# Patient Record
Sex: FEMALE | Race: WHITE | Employment: OTHER | URBAN - METROPOLITAN AREA
[De-identification: names, ages, dates, MRNs, and addresses within clinical notes are randomized per-mention and may not be internally consistent; named-entity substitution may affect disease eponyms.]

---

## 2020-11-01 ENCOUNTER — HOSPITAL ENCOUNTER (EMERGENCY)
Facility: HOSPITAL | Age: 70
Discharge: HOME | End: 2020-11-01
Attending: EMERGENCY MEDICINE
Payer: MEDICARE

## 2020-11-01 ENCOUNTER — APPOINTMENT (OUTPATIENT)
Dept: GENERAL RADIOLOGY | Facility: HOSPITAL | Age: 70
End: 2020-11-01
Attending: EMERGENCY MEDICINE
Payer: MEDICARE

## 2020-11-01 ENCOUNTER — APPOINTMENT (OUTPATIENT)
Dept: CT IMAGING | Facility: HOSPITAL | Age: 70
End: 2020-11-01
Attending: EMERGENCY MEDICINE
Payer: MEDICARE

## 2020-11-01 VITALS
SYSTOLIC BLOOD PRESSURE: 136 MMHG | BODY MASS INDEX: 24.76 KG/M2 | WEIGHT: 138 LBS | HEIGHT: 62.75 IN | HEART RATE: 70 BPM | RESPIRATION RATE: 15 BRPM | OXYGEN SATURATION: 97 % | DIASTOLIC BLOOD PRESSURE: 76 MMHG | TEMPERATURE: 98 F

## 2020-11-01 DIAGNOSIS — R07.9 CHEST PAIN OF UNCERTAIN ETIOLOGY: Primary | ICD-10-CM

## 2020-11-01 PROCEDURE — 71045 X-RAY EXAM CHEST 1 VIEW: CPT | Performed by: EMERGENCY MEDICINE

## 2020-11-01 PROCEDURE — 71260 CT THORAX DX C+: CPT | Performed by: EMERGENCY MEDICINE

## 2020-11-01 PROCEDURE — 84484 ASSAY OF TROPONIN QUANT: CPT | Performed by: EMERGENCY MEDICINE

## 2020-11-01 PROCEDURE — 93005 ELECTROCARDIOGRAM TRACING: CPT

## 2020-11-01 PROCEDURE — 93010 ELECTROCARDIOGRAM REPORT: CPT | Performed by: EMERGENCY MEDICINE

## 2020-11-01 PROCEDURE — 85379 FIBRIN DEGRADATION QUANT: CPT | Performed by: EMERGENCY MEDICINE

## 2020-11-01 PROCEDURE — 96360 HYDRATION IV INFUSION INIT: CPT

## 2020-11-01 PROCEDURE — 96361 HYDRATE IV INFUSION ADD-ON: CPT

## 2020-11-01 PROCEDURE — 85025 COMPLETE CBC W/AUTO DIFF WBC: CPT | Performed by: EMERGENCY MEDICINE

## 2020-11-01 PROCEDURE — 99285 EMERGENCY DEPT VISIT HI MDM: CPT

## 2020-11-01 PROCEDURE — 80048 BASIC METABOLIC PNL TOTAL CA: CPT | Performed by: EMERGENCY MEDICINE

## 2020-11-01 RX ORDER — ASPIRIN 81 MG/1
324 TABLET, CHEWABLE ORAL ONCE
Status: COMPLETED | OUTPATIENT
Start: 2020-11-01 | End: 2020-11-01

## 2020-11-01 NOTE — ED PROVIDER NOTES
Patient Seen in: [facility] Emergency Department      History   Patient presents with:  Chest Pain Angina    Stated Complaint: Testing    HPI    79year old non-smoker female who is usually in good health, now presents with multiple complaints inc without pain, normal range of motion and neck supple. Normal range of motion. No neck rigidity. Cardiovascular:      Rate and Rhythm: Normal rate and regular rhythm. Heart sounds: Normal heart sounds. No murmur.    Pulmonary:      Effort: Pulmonary e Abnormal            Final result                 Please view results for these tests on the individual orders. SARS-COV-2 BY PCR ()     EKG    Rate, intervals and axes as noted on EKG Report.   Rate: 73  Rhythm: Sinus Rhythm  Reading: NSR cigarette use, [address], Obesity)    ?  3 risk factors or history of atherosclerotic disease+2  1-2 risk factors+1  No risk factors known - 0    Troponin 0  ? 3× normal limit+2  1-3× normal limit+1  ? normal/limit - 0    Low Score (0-3 points), risk of MACE of 0

## 2021-03-01 ENCOUNTER — NURSE TRIAGE (OUTPATIENT)
Dept: INTERNAL MEDICINE CLINIC | Facility: CLINIC | Age: 71
End: 2021-03-01

## 2021-03-01 ENCOUNTER — TELEPHONE (OUTPATIENT)
Dept: INTERNAL MEDICINE CLINIC | Facility: CLINIC | Age: 71
End: 2021-03-01

## 2021-03-01 NOTE — TELEPHONE ENCOUNTER
Action Requested: Summary for Provider     []  Critical Lab, Recommendations Needed  [] Need Additional Advice  []   FYI    []   Need Orders  [] Need Medications Sent to Pharmacy  []  Other     SUMMARY:  NEW PATIENT APPOINTMENT    Reports that she has to \

## 2021-03-02 ENCOUNTER — HOSPITAL ENCOUNTER (OUTPATIENT)
Dept: GENERAL RADIOLOGY | Age: 71
Discharge: HOME | End: 2021-03-02
Attending: INTERNAL MEDICINE
Payer: MEDICARE

## 2021-03-02 ENCOUNTER — OFFICE VISIT (OUTPATIENT)
Dept: INTERNAL MEDICINE CLINIC | Facility: CLINIC | Age: 71
End: 2021-03-02
Payer: MEDICARE

## 2021-03-02 VITALS
SYSTOLIC BLOOD PRESSURE: 147 MMHG | BODY MASS INDEX: 25.4 KG/M2 | HEART RATE: 68 BPM | WEIGHT: 138 LBS | TEMPERATURE: 98 F | DIASTOLIC BLOOD PRESSURE: 82 MMHG | HEIGHT: 62 IN

## 2021-03-02 DIAGNOSIS — M21.619 BUNION: ICD-10-CM

## 2021-03-02 DIAGNOSIS — M17.12 PRIMARY OSTEOARTHRITIS OF LEFT KNEE: ICD-10-CM

## 2021-03-02 DIAGNOSIS — Z12.31 ENCOUNTER FOR SCREENING MAMMOGRAM FOR BREAST CANCER: Primary | ICD-10-CM

## 2021-03-02 DIAGNOSIS — Z71.89 EDUCATED ABOUT COVID-19 VIRUS INFECTION: ICD-10-CM

## 2021-03-02 DIAGNOSIS — Z78.0 ASYMPTOMATIC MENOPAUSAL STATE: ICD-10-CM

## 2021-03-02 DIAGNOSIS — Z13.820 SCREENING FOR OSTEOPOROSIS: ICD-10-CM

## 2021-03-02 DIAGNOSIS — M20.42 HAMMERTOE OF LEFT FOOT: ICD-10-CM

## 2021-03-02 DIAGNOSIS — K21.9 GASTROESOPHAGEAL REFLUX DISEASE, UNSPECIFIED WHETHER ESOPHAGITIS PRESENT: ICD-10-CM

## 2021-03-02 DIAGNOSIS — I10 HYPERTENSION GOAL BP (BLOOD PRESSURE) < 130/80: ICD-10-CM

## 2021-03-02 DIAGNOSIS — Z23 NEED FOR INFLUENZA VACCINATION: ICD-10-CM

## 2021-03-02 PROCEDURE — 99205 OFFICE O/P NEW HI 60 MIN: CPT | Performed by: INTERNAL MEDICINE

## 2021-03-02 PROCEDURE — 73560 X-RAY EXAM OF KNEE 1 OR 2: CPT | Performed by: INTERNAL MEDICINE

## 2021-03-02 RX ORDER — POLYETHYLENE GLYCOL 3350 17 G/17G
17 POWDER, FOR SOLUTION ORAL
COMMUNITY
End: 2021-05-06 | Stop reason: ALTCHOICE

## 2021-03-02 RX ORDER — PANTOPRAZOLE SODIUM 20 MG/1
20 TABLET, DELAYED RELEASE ORAL
Qty: 30 TABLET | Refills: 0 | Status: SHIPPED | OUTPATIENT
Start: 2021-03-02 | End: 2021-03-17

## 2021-03-02 RX ORDER — TELMISARTAN 20 MG/1
20 TABLET ORAL NIGHTLY
Qty: 90 TABLET | Refills: 1 | Status: SHIPPED | OUTPATIENT
Start: 2021-03-02 | End: 2021-04-16

## 2021-03-02 RX ORDER — FAMOTIDINE 20 MG/1
TABLET ORAL
COMMUNITY
Start: 2020-06-10 | End: 2021-03-02

## 2021-03-02 RX ORDER — SIMVASTATIN 40 MG
TABLET ORAL
COMMUNITY
Start: 2018-05-09 | End: 2021-04-16

## 2021-03-02 RX ORDER — IBUPROFEN 600 MG/1
TABLET ORAL
COMMUNITY
Start: 2020-09-09 | End: 2021-07-02

## 2021-03-02 RX ORDER — FLUTICASONE PROPIONATE 50 MCG
SPRAY, SUSPENSION (ML) NASAL
COMMUNITY
Start: 2018-05-07 | End: 2021-09-03

## 2021-03-02 NOTE — PROGRESS NOTES
History of Present Illness   Patient ID: Jessica Mcnulty is a 79year old female. Chief Complaint: Establish Care    New patient. Here to establish care. 1.  She had Covid in November 2020.   She still complains of some shortness of breath and some \"long-ha Constitutional: She is oriented to person, place, and time. She appears well-developed and well-nourished. HENT:   Head: Normocephalic and atraumatic. Eyes: Conjunctivae are normal. Right eye exhibits no discharge. Left eye exhibits no discharge.    Nec - XR DEXA BONE DENSITOMETRY (CPT=77080); Future  Plan  As above    7. Primary osteoarthritis of left knee  - XR KNEE (1 OR 2 VIEWS), LEFT (CPT=73560);  Future  Plan  As above, further recommendations depending on severity, states she has been told she needs Reviewed Social History:  Social History    Tobacco Use      Smoking status: Never Smoker      Smokeless tobacco: Never Used    Alcohol use: Never      Frequency: Never    Drug use: Never     Reviewed Current Medications:  Current Outpatient Medications Gastritis is inflammation and irritation of the stomach lining. You can have it for a short time (acute) or be long lasting (chronic). Infection with bacteria called H pylori most often causes gastritis.  More than a third of people in the [place] have these sherin Follow up with your healthcare provider, or as advised by our staff. You may need testing to check for inflammation or an ulcer.   When to seek medical advice  Call your healthcare provider for any of the following:  · Stomach pain that gets worse or moves · Upset stomach (nausea) or vomiting  Relieving your discomfort  You and your healthcare provider can work together to find the treatment options that best ease your symptoms. These may include lifestyle changes, medicine, and possibly surgery.   Many peopl · Fatty, fried, or spicy food  · Mint, chocolate, onions, and tomatoes  · Peppermint  · Any other foods that seem to irritate your stomach or cause you pain  Relieve the pressure  Tips include the following:  · Eat smaller meals, even if you have to eat mo These also cause the stomach to make less acid. They reduce stomach acid more than H-2 blockers. They may be used for a short time, or longer to treat certain conditions. You can buy some of them over the counter. Or your provider may prescribe them.  They Take this medicine by mouth. Follow the directions on the product label. If you are taking this medicine without a prescription, take one tablet every day.  Do not use for longer than 14 days or repeat a course of treatment more often than every 4 months un · nelfinavir  This medicine may also interact with the following medications:  · ampicillin  · certain medicines for anxiety or sleep  · certain medicines that treat or prevent blood clots like warfarin  · cyclosporine  · diazepam  · digoxin  · disulfiram Do not treat yourself for heartburn with this medicine for more than 14 days in a row. You should only use this medicine for a 2-week treatment period once every 4 months.  If your symptoms return shortly after your therapy is complete, or within the 4 gerardo The circulatory system is made up of the heart and blood vessels that carry blood through the body. Your heart is the pump for this system. With each heartbeat (contraction), the heart sends blood out through large blood vessels called arteries.  Blood pres · Stage 1 high blood pressure is systolic is 776 to 603 or diastolic between 80 to 89  · Stage 2 high blood pressure is when systolic is 023 or higher or the diastolic is 90 or higher  High blood pressure is diagnosed when multiple, separate readings show · Maintain a healthy weight. Being overweight makes you more likely to have high blood pressure. Losing excess weight helps lower blood pressure. · Exercise regularly. Daily exercise helps your heart and blood vessels work better and stay healthier.  It ca Your blood pressure can also rise if you are emotionally upset or in intense pain. It may go back to normal after a period of rest.  Blood pressure measurements are given as 2 numbers. Systolic blood pressure is the upper number.  This is the pressure when ? Use only small amounts of salt when cooking. · Start an exercise program. Talk with your healthcare provider about what exercise program is best for you. It doesn’t have to be difficult.  Even brisk walking for 20 minutes 3 times a week is a good form of · Don't smoke or drink coffee for 30 minutes  · Go to the bathroom before the test.  · Relax for 5 minutes before taking the measurement. · Sit correctly. Be sure your back is supported. Don't sit on a couch or soft chair.  Uncross your feet and place them · Throbbing or rushing sound in the ears  · Nosebleed  · Extreme drowsiness, confusion, or fainting  · Dizziness or dizziness with spinning sensation (vertigo)  · Weakness in an arm or leg or on one side of the face  · Trouble speaking or seeing   Controll · Ask your doctor about the DASH eating plan. This plan helps reduce blood pressure. · When you go to a restaurant, ask that your meal be prepared with no added salt.     Maintain a healthy weight  · Ask your healthcare provider how many calories to eat a Eating salt (sodium) can make your body retain too much water. Excess water makes your heart work harder. Canned, packaged, and frozen foods are easy to prepare. But they are often high in sodium.  Here are some ideas for low-salt foods you can easily make

## 2021-03-02 NOTE — PATIENT INSTRUCTIONS
Gastritis (Adult)    Gastritis is inflammation and irritation of the stomach lining. You can have it for a short time (acute) or be long lasting (chronic). Infection with bacteria called H pylori most often causes gastritis.  More than a third of people i Follow up with your healthcare provider, or as advised by our staff. You may need testing to check for inflammation or an ulcer.   When to seek medical advice  Call your healthcare provider for any of the following:  · Stomach pain that gets worse or moves · Upset stomach (nausea) or vomiting  Relieving your discomfort  You and your healthcare provider can work together to find the treatment options that best ease your symptoms. These may include lifestyle changes, medicine, and possibly surgery.   Many peopl · Fatty, fried, or spicy food  · Mint, chocolate, onions, and tomatoes  · Peppermint  · Any other foods that seem to irritate your stomach or cause you pain  Relieve the pressure  Tips include the following:  · Eat smaller meals, even if you have to eat mo These also cause the stomach to make less acid. They reduce stomach acid more than H-2 blockers. They may be used for a short time, or longer to treat certain conditions. You can buy some of them over the counter. Or your provider may prescribe them.  They Take this medicine by mouth. Follow the directions on the product label. If you are taking this medicine without a prescription, take one tablet every day.  Do not use for longer than 14 days or repeat a course of treatment more often than every 4 months un · nelfinavir  This medicine may also interact with the following medications:  · ampicillin  · certain medicines for anxiety or sleep  · certain medicines that treat or prevent blood clots like warfarin  · cyclosporine  · diazepam  · digoxin  · disulfiram Do not treat yourself for heartburn with this medicine for more than 14 days in a row. You should only use this medicine for a 2-week treatment period once every 4 months.  If your symptoms return shortly after your therapy is complete, or within the 4 gerardo The circulatory system is made up of the heart and blood vessels that carry blood through the body. Your heart is the pump for this system. With each heartbeat (contraction), the heart sends blood out through large blood vessels called arteries.  Blood pres · Stage 1 high blood pressure is systolic is 401 to 982 or diastolic between 80 to 89  · Stage 2 high blood pressure is when systolic is 987 or higher or the diastolic is 90 or higher  High blood pressure is diagnosed when multiple, separate readings show · Maintain a healthy weight. Being overweight makes you more likely to have high blood pressure. Losing excess weight helps lower blood pressure. · Exercise regularly. Daily exercise helps your heart and blood vessels work better and stay healthier.  It ca Your blood pressure can also rise if you are emotionally upset or in intense pain. It may go back to normal after a period of rest.  Blood pressure measurements are given as 2 numbers. Systolic blood pressure is the upper number.  This is the pressure when ? Use only small amounts of salt when cooking. · Start an exercise program. Talk with your healthcare provider about what exercise program is best for you. It doesn’t have to be difficult.  Even brisk walking for 20 minutes 3 times a week is a good form of · Don't smoke or drink coffee for 30 minutes  · Go to the bathroom before the test.  · Relax for 5 minutes before taking the measurement. · Sit correctly. Be sure your back is supported. Don't sit on a couch or soft chair.  Uncross your feet and place them · Throbbing or rushing sound in the ears  · Nosebleed  · Extreme drowsiness, confusion, or fainting  · Dizziness or dizziness with spinning sensation (vertigo)  · Weakness in an arm or leg or on one side of the face  · Trouble speaking or seeing   Controll · Ask your doctor about the DASH eating plan. This plan helps reduce blood pressure. · When you go to a restaurant, ask that your meal be prepared with no added salt.     Maintain a healthy weight  · Ask your healthcare provider how many calories to eat a Eating salt (sodium) can make your body retain too much water. Excess water makes your heart work harder. Canned, packaged, and frozen foods are easy to prepare. But they are often high in sodium.  Here are some ideas for low-salt foods you can easily make

## 2021-03-02 NOTE — TELEPHONE ENCOUNTER
LMTCB, offered patient virtual video visit to discuss their concerns, if patient would prefer office visit and has no symptoms of covid, feeling well office visit is fine.  appt 3/2 [facility] care

## 2021-03-03 PROCEDURE — 90732 PPSV23 VACC 2 YRS+ SUBQ/IM: CPT | Performed by: INTERNAL MEDICINE

## 2021-03-03 PROCEDURE — G0009 ADMIN PNEUMOCOCCAL VACCINE: HCPCS | Performed by: INTERNAL MEDICINE

## 2021-03-03 RX ORDER — PANTOPRAZOLE SODIUM 20 MG/1
TABLET, DELAYED RELEASE ORAL
Qty: 90 TABLET | Refills: 0 | OUTPATIENT
Start: 2021-03-03

## 2021-03-09 ENCOUNTER — OFFICE VISIT (OUTPATIENT)
Dept: PODIATRY CLINIC | Facility: CLINIC | Age: 71
End: 2021-03-09
Payer: MEDICARE

## 2021-03-09 ENCOUNTER — HOSPITAL ENCOUNTER (OUTPATIENT)
Dept: GENERAL RADIOLOGY | Facility: HOSPITAL | Age: 71
Discharge: HOME | End: 2021-03-09
Attending: PODIATRIST
Payer: MEDICARE

## 2021-03-09 DIAGNOSIS — M20.42 HAMMER TOE OF LEFT FOOT: ICD-10-CM

## 2021-03-09 DIAGNOSIS — M21.619 BUNION: ICD-10-CM

## 2021-03-09 DIAGNOSIS — M21.619 BUNION: Primary | ICD-10-CM

## 2021-03-09 PROCEDURE — 99203 OFFICE O/P NEW LOW 30 MIN: CPT | Performed by: PODIATRIST

## 2021-03-09 PROCEDURE — 73630 X-RAY EXAM OF FOOT: CPT | Performed by: PODIATRIST

## 2021-03-09 NOTE — PROGRESS NOTES
HPI:    Patient ID: Shelia Kunz is a 79year old female. This pleasant 68-year-old female presents as a new patient to me on referral from [address]. Patient's chief concern is the left foot.   She is aware over a number of years of a progressive and increas deficit no signs of weakness. The left foot is the primary concern. It is obvious by observation that she has a moderate to severe bunion with a completely overlapping and dislocated second digit.   The third digit is quite abducted as well but not overla

## 2021-03-10 ENCOUNTER — HOSPITAL ENCOUNTER (OUTPATIENT)
Dept: MAMMOGRAPHY | Facility: HOSPITAL | Age: 71
Discharge: HOME | End: 2021-03-10
Attending: INTERNAL MEDICINE
Payer: MEDICARE

## 2021-03-10 ENCOUNTER — HOSPITAL ENCOUNTER (OUTPATIENT)
Dept: BONE DENSITY | Facility: HOSPITAL | Age: 71
Discharge: HOME | End: 2021-03-10
Attending: INTERNAL MEDICINE
Payer: MEDICARE

## 2021-03-10 DIAGNOSIS — Z13.820 SCREENING FOR OSTEOPOROSIS: ICD-10-CM

## 2021-03-10 DIAGNOSIS — Z78.0 ASYMPTOMATIC MENOPAUSAL STATE: ICD-10-CM

## 2021-03-10 DIAGNOSIS — Z12.31 ENCOUNTER FOR SCREENING MAMMOGRAM FOR BREAST CANCER: ICD-10-CM

## 2021-03-10 PROCEDURE — 77067 SCR MAMMO BI INCL CAD: CPT | Performed by: INTERNAL MEDICINE

## 2021-03-10 PROCEDURE — 77063 BREAST TOMOSYNTHESIS BI: CPT | Performed by: INTERNAL MEDICINE

## 2021-03-10 PROCEDURE — 77080 DXA BONE DENSITY AXIAL: CPT | Performed by: INTERNAL MEDICINE

## 2021-03-12 ENCOUNTER — TELEPHONE (OUTPATIENT)
Dept: INTERNAL MEDICINE CLINIC | Facility: CLINIC | Age: 71
End: 2021-03-12

## 2021-03-12 ENCOUNTER — OFFICE VISIT (OUTPATIENT)
Dept: INTERNAL MEDICINE CLINIC | Facility: CLINIC | Age: 71
End: 2021-03-12
Payer: MEDICARE

## 2021-03-12 ENCOUNTER — LAB ENCOUNTER (OUTPATIENT)
Dept: LAB | Age: 71
End: 2021-03-12
Attending: INTERNAL MEDICINE
Payer: MEDICARE

## 2021-03-12 VITALS
TEMPERATURE: 99 F | WEIGHT: 138 LBS | DIASTOLIC BLOOD PRESSURE: 77 MMHG | HEART RATE: 64 BPM | BODY MASS INDEX: 25.4 KG/M2 | SYSTOLIC BLOOD PRESSURE: 147 MMHG | HEIGHT: 62 IN

## 2021-03-12 DIAGNOSIS — R17 YELLOW SKIN: ICD-10-CM

## 2021-03-12 DIAGNOSIS — E78.2 MIXED HYPERLIPIDEMIA: ICD-10-CM

## 2021-03-12 DIAGNOSIS — R17 YELLOW SKIN: Primary | ICD-10-CM

## 2021-03-12 LAB
ALBUMIN SERPL-MCNC: 4.2 G/DL (ref 3.4–5)
ALBUMIN SERPL-MCNC: 4.2 G/DL (ref 3.4–5)
ALBUMIN/GLOB SERPL: 1.6 {RATIO} (ref 1–2)
ALP LIVER SERPL-CCNC: 70 U/L
ALP LIVER SERPL-CCNC: 70 U/L
ALT SERPL-CCNC: 17 U/L
ALT SERPL-CCNC: 17 U/L
ANION GAP SERPL CALC-SCNC: 4 MMOL/L (ref 0–18)
AST SERPL-CCNC: 15 U/L (ref 15–37)
AST SERPL-CCNC: 15 U/L (ref 15–37)
BILIRUB DIRECT SERPL-MCNC: 0.2 MG/DL (ref 0–0.2)
BILIRUB SERPL-MCNC: 0.7 MG/DL (ref 0.1–2)
BILIRUB SERPL-MCNC: 0.7 MG/DL (ref 0.1–2)
BUN BLD-MCNC: 10 MG/DL (ref 7–18)
BUN/CREAT SERPL: 13.5 (ref 10–20)
CALCIUM BLD-MCNC: 9.6 MG/DL (ref 8.5–10.1)
CHLORIDE SERPL-SCNC: 110 MMOL/L (ref 98–112)
CO2 SERPL-SCNC: 31 MMOL/L (ref 21–32)
CREAT BLD-MCNC: 0.74 MG/DL
GLOBULIN PLAS-MCNC: 2.7 G/DL (ref 2.8–4.4)
GLUCOSE BLD-MCNC: 81 MG/DL (ref 70–99)
LIPASE SERPL-CCNC: 100 U/L (ref 73–393)
M PROTEIN MFR SERPL ELPH: 6.9 G/DL (ref 6.4–8.2)
M PROTEIN MFR SERPL ELPH: 6.9 G/DL (ref 6.4–8.2)
OSMOLALITY SERPL CALC.SUM OF ELEC: 298 MOSM/KG (ref 275–295)
PATIENT FASTING Y/N/NP: YES
POTASSIUM SERPL-SCNC: 4.4 MMOL/L (ref 3.5–5.1)
SODIUM SERPL-SCNC: 145 MMOL/L (ref 136–145)

## 2021-03-12 PROCEDURE — 82248 BILIRUBIN DIRECT: CPT

## 2021-03-12 PROCEDURE — 80053 COMPREHEN METABOLIC PANEL: CPT

## 2021-03-12 PROCEDURE — 83690 ASSAY OF LIPASE: CPT

## 2021-03-12 PROCEDURE — 36415 COLL VENOUS BLD VENIPUNCTURE: CPT

## 2021-03-12 PROCEDURE — 99214 OFFICE O/P EST MOD 30 MIN: CPT | Performed by: INTERNAL MEDICINE

## 2021-03-12 NOTE — PROGRESS NOTES
History of Present Illness   Patient ID: Sebastien Chang is a 79year old female. Chief Complaint: No chief complaint on file.       HPI  Reason for Visit:  Akanksha Singh RN     [facility]    3/12/21 9:41 AM  Note     Patient states her face has a yellow tint and s Pulse: 64   Temp: 98.8 °F (37.1 °C)   TempSrc: Tympanic   Weight: 138 lb (62.6 kg)   Height: 5' 2\" (1.575 m)     Body mass index is 25.24 kg/m².   BP Readings from Last 3 Encounters:  03/12/21 : 147/77  03/02/21 : 147/82  11/01/20 : 136/76      Physical of 3/12/2021 15:26   Ref.  Range 3/12/2021 12:28   Glucose Latest Ref Range: 70 - 99 mg/dL 81   Sodium Latest Ref Range: 136 - 145 mmol/L 145   Potassium Latest Ref Range: 3.5 - 5.1 mmol/L 4.4   Chloride Latest Ref Range: 98 - 112 mmol/L 110   Carbon Dioxid Results   Component Value Date    WBC 3.9 (L) 11/01/2020    RBC 4.18 11/01/2020    HGB 12.5 11/01/2020    HCT 37.8 11/01/2020    MCV 90.4 11/01/2020    MCH 29.9 11/01/2020    MCHC 33.1 11/01/2020    RDW 12.7 11/01/2020    .0 11/01/2020     No result understands and agrees to follow directions and advice.       ----------------------------------------- PATIENT INSTRUCTIONS-----------------------------------------     Patient Instructions   Until I receive your lab work back please do not take janeth

## 2021-03-12 NOTE — TELEPHONE ENCOUNTER
Patient states her face has a yellow tint and states she thinks it is from taking pantoprazole. Yellow color started 3 days ago and is only on her face. No other symptoms. Patient is taking 2 new medications since 3/2/21 telmisartan and pantoprazole.

## 2021-03-12 NOTE — PATIENT INSTRUCTIONS
Until I receive your lab work back please do not take simvastatin and do not take pantoprazole. If everything looks good we will switch your simvastatin to rosuvastatin and we will discuss alternatives to pantoprazole if necessary.

## 2021-03-13 DIAGNOSIS — Z23 NEED FOR VACCINATION: ICD-10-CM

## 2021-03-16 ENCOUNTER — NURSE TRIAGE (OUTPATIENT)
Dept: INTERNAL MEDICINE CLINIC | Facility: CLINIC | Age: 71
End: 2021-03-16

## 2021-03-16 NOTE — TELEPHONE ENCOUNTER
Patient called and states she's been having palpitations on and off for about 1 week.  No SOB but does feel a little lightheaded but hasn't passed out

## 2021-03-16 NOTE — TELEPHONE ENCOUNTER
Action Requested: Summary for Provider     []  Critical Lab, Recommendations Needed  [] Need Additional Advice  []   FYI    []   Need Orders  [] Need Medications Sent to Pharmacy  []  Other     SUMMARY:   Reports intermittent palpitations for 1 week.  Feels

## 2021-03-17 ENCOUNTER — OFFICE VISIT (OUTPATIENT)
Dept: INTERNAL MEDICINE CLINIC | Facility: CLINIC | Age: 71
End: 2021-03-17
Payer: MEDICARE

## 2021-03-17 ENCOUNTER — EKG ENCOUNTER (OUTPATIENT)
Dept: LAB | Age: 71
End: 2021-03-17
Attending: INTERNAL MEDICINE
Payer: MEDICARE

## 2021-03-17 VITALS
BODY MASS INDEX: 25.4 KG/M2 | WEIGHT: 138 LBS | TEMPERATURE: 98 F | OXYGEN SATURATION: 99 % | DIASTOLIC BLOOD PRESSURE: 84 MMHG | HEIGHT: 62 IN | SYSTOLIC BLOOD PRESSURE: 128 MMHG | HEART RATE: 59 BPM

## 2021-03-17 DIAGNOSIS — R94.31 ABNORMAL EKG: ICD-10-CM

## 2021-03-17 DIAGNOSIS — M17.12 PRIMARY OSTEOARTHRITIS OF LEFT KNEE: ICD-10-CM

## 2021-03-17 DIAGNOSIS — Z86.16 HISTORY OF COVID-19: ICD-10-CM

## 2021-03-17 DIAGNOSIS — K21.9 GASTROESOPHAGEAL REFLUX DISEASE, UNSPECIFIED WHETHER ESOPHAGITIS PRESENT: ICD-10-CM

## 2021-03-17 DIAGNOSIS — M85.80 OSTEOPENIA WITH HIGH RISK OF FRACTURE: ICD-10-CM

## 2021-03-17 DIAGNOSIS — R06.02 SHORTNESS OF BREATH: ICD-10-CM

## 2021-03-17 DIAGNOSIS — R06.02 SHORTNESS OF BREATH: Primary | ICD-10-CM

## 2021-03-17 PROCEDURE — 99214 OFFICE O/P EST MOD 30 MIN: CPT | Performed by: INTERNAL MEDICINE

## 2021-03-17 PROCEDURE — 93005 ELECTROCARDIOGRAM TRACING: CPT

## 2021-03-17 PROCEDURE — 93010 ELECTROCARDIOGRAM REPORT: CPT | Performed by: INTERNAL MEDICINE

## 2021-03-17 NOTE — PROGRESS NOTES
History of Present Illness   Patient ID: Cristian Gomes is a 79year old female.   Chief Complaint: Abdominal Pain (abdomen/low back pain started after starting pantoprazole ) and Dyspnea (needs to inhale deeply to get a full breath out)      HPI   She is here w swelling. Physical Exam     03/17/21  1323   BP: 128/84   Pulse: 59   Temp: 98.2 °F (36.8 °C)   TempSrc: Tympanic   SpO2: 99%   Weight: 138 lb (62.6 kg)   Height: 5' 2\" (1.575 m)     Body mass index is 25.24 kg/m².   BP Readings from Last 3 Encoun worsened with pantoprazole recommend full cardiac work-up, will get stress testing along with a EKG now and calcium scoring. Anginal equivalent? CT 08/11/2020 shows no significant calcifications no pericardial thickening, no enlargement of the cardia.   A TECHNIQUE:    CT images of the chest were obtained with non-ionic intravenous contrast material.  Automated exposure control for dose reduction was used. Adjustment of the mA and/or kV was done based on the patient's size.  Use of iterative reconstruction strictly prohibited by law.           Lab Results   Component Value Date    GLU 81 03/12/2021    BUN 10 03/12/2021    BUNCREA 13.5 03/12/2021    CREATSERUM 0.74 03/12/2021    ANIONGAP 4 03/12/2021    GFRNAA 82 03/12/2021    GFRAA 95 03/12/2021    CA 9.6 03/ fluticasone propionate 50 mcg/actuation nasal spray,suspension   SHAKE LIQUID AND USE 2 SPRAYS IN EACH NOSTRIL EVERY DAY AS DIRECTED     • ibuprofen 600 MG Oral Tab ibuprofen 600 mg tablet   TAKE 1 TABLET BY MOUTH THREE TIMES DAILY AS NEEDED     • cyanocob

## 2021-03-18 ENCOUNTER — ORDER TRANSCRIPTION (OUTPATIENT)
Dept: ADMINISTRATIVE | Facility: HOSPITAL | Age: 71
End: 2021-03-18

## 2021-03-18 DIAGNOSIS — Z01.818 PRE-OP TESTING: Primary | ICD-10-CM

## 2021-03-18 DIAGNOSIS — Z11.59 ENCOUNTER FOR SCREENING FOR OTHER VIRAL DISEASES: ICD-10-CM

## 2021-03-29 ENCOUNTER — LAB ENCOUNTER (OUTPATIENT)
Dept: LAB | Facility: HOSPITAL | Age: 71
End: 2021-03-29
Attending: INTERNAL MEDICINE
Payer: MEDICARE

## 2021-03-29 DIAGNOSIS — Z11.59 ENCOUNTER FOR SCREENING FOR OTHER VIRAL DISEASES: ICD-10-CM

## 2021-03-29 DIAGNOSIS — Z01.818 PRE-OP TESTING: ICD-10-CM

## 2021-04-01 ENCOUNTER — HOSPITAL ENCOUNTER (OUTPATIENT)
Dept: CV DIAGNOSTICS | Facility: HOSPITAL | Age: 71
Discharge: HOME | End: 2021-04-01
Attending: INTERNAL MEDICINE
Payer: MEDICARE

## 2021-04-01 DIAGNOSIS — R06.02 SHORTNESS OF BREATH: ICD-10-CM

## 2021-04-01 DIAGNOSIS — R94.31 ABNORMAL EKG: ICD-10-CM

## 2021-04-01 PROCEDURE — 93017 CV STRESS TEST TRACING ONLY: CPT | Performed by: INTERNAL MEDICINE

## 2021-04-01 PROCEDURE — 93350 STRESS TTE ONLY: CPT | Performed by: INTERNAL MEDICINE

## 2021-04-01 PROCEDURE — 93016 CV STRESS TEST SUPVJ ONLY: CPT | Performed by: INTERNAL MEDICINE

## 2021-04-01 PROCEDURE — 93018 CV STRESS TEST I&R ONLY: CPT | Performed by: INTERNAL MEDICINE

## 2021-04-09 ENCOUNTER — HOSPITAL ENCOUNTER (OUTPATIENT)
Dept: CT IMAGING | Age: 71
Discharge: HOME | End: 2021-04-09
Attending: INTERNAL MEDICINE

## 2021-04-09 DIAGNOSIS — R94.31 ABNORMAL EKG: ICD-10-CM

## 2021-04-16 ENCOUNTER — OFFICE VISIT (OUTPATIENT)
Dept: INTERNAL MEDICINE CLINIC | Facility: CLINIC | Age: 71
End: 2021-04-16
Payer: MEDICARE

## 2021-04-16 VITALS
HEIGHT: 62 IN | WEIGHT: 137 LBS | HEART RATE: 66 BPM | DIASTOLIC BLOOD PRESSURE: 80 MMHG | SYSTOLIC BLOOD PRESSURE: 130 MMHG | BODY MASS INDEX: 25.21 KG/M2 | TEMPERATURE: 98 F

## 2021-04-16 DIAGNOSIS — E78.2 MIXED HYPERLIPIDEMIA: Primary | ICD-10-CM

## 2021-04-16 DIAGNOSIS — E55.9 VITAMIN D DEFICIENCY: ICD-10-CM

## 2021-04-16 DIAGNOSIS — E53.8 B12 DEFICIENCY: ICD-10-CM

## 2021-04-16 DIAGNOSIS — M85.80 OSTEOPENIA WITH HIGH RISK OF FRACTURE: ICD-10-CM

## 2021-04-16 PROCEDURE — 99214 OFFICE O/P EST MOD 30 MIN: CPT | Performed by: INTERNAL MEDICINE

## 2021-04-16 RX ORDER — SIMVASTATIN 40 MG
40 TABLET ORAL NIGHTLY
Qty: 90 TABLET | Refills: 1 | Status: SHIPPED | OUTPATIENT
Start: 2021-04-16 | End: 2021-06-25

## 2021-04-16 NOTE — PROGRESS NOTES
History of Present Illness   Patient ID: Melissa Kohler is a 79year old female. Chief Complaint: Hypertension      HPI   1. She has hypertension, she was started on telmisartan 20 mg but felt very unwell.   She stopped taking telmisartan 3/10/2021 and her blo intact. Conjunctiva/sclera: Conjunctivae normal.   Cardiovascular:      Rate and Rhythm: Normal rate. Heart sounds: Normal heart sounds. Pulmonary:      Effort: Pulmonary effort is normal.   Abdominal:      Palpations: Abdomen is soft.    Muscul 03/12/2021    BILT 0.7 03/12/2021    TP 6.9 03/12/2021    TP 6.9 03/12/2021    ALB 4.2 03/12/2021    ALB 4.2 03/12/2021    GLOBULIN 2.7 (L) 03/12/2021     03/12/2021    K 4.4 03/12/2021     03/12/2021    CO2 31.0 03/12/2021     No results found affected joint. (Patient not taking: Reported on 4/16/2021 ) 100 g 3   • Polyethylene Glycol 3350 17 GM/SCOOP Oral Powder 17 g. Alisa Pires MD  Internal Medicine       Call office with any questions or seek emergency care if necessary.    Dillan

## 2021-04-19 ENCOUNTER — HOSPITAL ENCOUNTER (OUTPATIENT)
Dept: ULTRASOUND IMAGING | Facility: HOSPITAL | Age: 71
Discharge: HOME | End: 2021-04-19
Attending: INTERNAL MEDICINE
Payer: MEDICARE

## 2021-04-19 ENCOUNTER — LAB ENCOUNTER (OUTPATIENT)
Dept: LAB | Facility: HOSPITAL | Age: 71
End: 2021-04-19
Attending: INTERNAL MEDICINE
Payer: MEDICARE

## 2021-04-19 DIAGNOSIS — E55.9 VITAMIN D DEFICIENCY: ICD-10-CM

## 2021-04-19 DIAGNOSIS — M85.80 OSTEOPENIA WITH HIGH RISK OF FRACTURE: ICD-10-CM

## 2021-04-19 DIAGNOSIS — R94.31 ABNORMAL EKG: ICD-10-CM

## 2021-04-19 DIAGNOSIS — E78.2 MIXED HYPERLIPIDEMIA: ICD-10-CM

## 2021-04-19 DIAGNOSIS — E53.8 B12 DEFICIENCY: ICD-10-CM

## 2021-04-19 DIAGNOSIS — Z13.6 SCREENING FOR CARDIOVASCULAR CONDITION: ICD-10-CM

## 2021-04-19 PROCEDURE — 80061 LIPID PANEL: CPT

## 2021-04-19 PROCEDURE — 36415 COLL VENOUS BLD VENIPUNCTURE: CPT

## 2021-04-19 PROCEDURE — 82607 VITAMIN B-12: CPT

## 2021-04-19 PROCEDURE — 82306 VITAMIN D 25 HYDROXY: CPT

## 2021-04-26 ENCOUNTER — LAB ENCOUNTER (OUTPATIENT)
Dept: LAB | Facility: HOSPITAL | Age: 71
End: 2021-04-26
Attending: INTERNAL MEDICINE
Payer: MEDICARE

## 2021-04-26 ENCOUNTER — OFFICE VISIT (OUTPATIENT)
Dept: RHEUMATOLOGY | Facility: CLINIC | Age: 71
End: 2021-04-26
Payer: MEDICARE

## 2021-04-26 VITALS
WEIGHT: 136 LBS | DIASTOLIC BLOOD PRESSURE: 75 MMHG | HEIGHT: 62 IN | BODY MASS INDEX: 25.03 KG/M2 | SYSTOLIC BLOOD PRESSURE: 120 MMHG | HEART RATE: 67 BPM

## 2021-04-26 DIAGNOSIS — M85.859 OSTEOPENIA OF NECK OF FEMUR, UNSPECIFIED LATERALITY: Primary | ICD-10-CM

## 2021-04-26 DIAGNOSIS — M85.859 OSTEOPENIA OF NECK OF FEMUR, UNSPECIFIED LATERALITY: ICD-10-CM

## 2021-04-26 PROCEDURE — 83970 ASSAY OF PARATHORMONE: CPT

## 2021-04-26 PROCEDURE — 99204 OFFICE O/P NEW MOD 45 MIN: CPT | Performed by: INTERNAL MEDICINE

## 2021-04-26 PROCEDURE — 84165 PROTEIN E-PHORESIS SERUM: CPT

## 2021-04-26 PROCEDURE — 83883 ASSAY NEPHELOMETRY NOT SPEC: CPT

## 2021-04-26 PROCEDURE — 83516 IMMUNOASSAY NONANTIBODY: CPT

## 2021-04-26 PROCEDURE — 36415 COLL VENOUS BLD VENIPUNCTURE: CPT

## 2021-04-26 PROCEDURE — 86256 FLUORESCENT ANTIBODY TITER: CPT

## 2021-04-26 PROCEDURE — 86334 IMMUNOFIX E-PHORESIS SERUM: CPT

## 2021-04-26 PROCEDURE — 82784 ASSAY IGA/IGD/IGG/IGM EACH: CPT

## 2021-04-26 NOTE — PROGRESS NOTES
Romelia Marquez is a 79year old female who presents for No chief complaint on file. Km Waggoner    HPI:   CC: osteopenia  Consult: referred by PCP Dr. Danni Keys    This is a 80 yo F with hx of HLD, GERD, L knee OA, Chronic LBP 2/2 severe spinal stenosis presents for the eval Gel Apply 2 g topically 4 (four) times daily. Apply thin layer to affected joint.  (Patient not taking: Reported on 4/16/2021 ) 100 g 3   • ibuprofen 600 MG Oral Tab ibuprofen 600 mg tablet   TAKE 1 TABLET BY MOUTH THREE TIMES DAILY AS NEEDED (Patient not t oral lesions. No lymphadenopathy. No facial rash  CVS: RRR, no murmurs rubs or gallops. Equal 2+ distal pulses. LUNGS: CTAB, no increased work of breathing  ABDOMEN:  soft NT/ND, +BS, no HSM  SKIN: No rashes or skin lesions.  No nail findings  MSK:  [facility] Bilirubin, Direct      0.0 - 0.2 mg/dL  0.2    Lipase, Serum      73 - 393 U/L   100   Vitamin D, 25OH, Total      30.0 - 100.0 ng/mL 41.5       IMAGING:     Bone Density 3/2021:  LEFT FEMORAL NECK   BMD: 0.618 gm/sq. cm. T SCORE: -2.1 Z SCORE: -0.3     assess at that time if she needs treatment    Thank you for allowing me to participate in this patients care.  Can follow up after her next bone density     Moe Delgado MD  4/26/2021  11:02 AM

## 2021-04-26 NOTE — PATIENT INSTRUCTIONS
You were seen today for osteopenia which means weak bones  Your osteopenia is not at the point where you actually need treatment  Would recommend to continue vitamin D 2000 units daily  Try taking calcium 600 mg daily but if it irritates you continue to co

## 2021-05-05 ENCOUNTER — TELEPHONE (OUTPATIENT)
Dept: INTERNAL MEDICINE CLINIC | Facility: CLINIC | Age: 71
End: 2021-05-05

## 2021-05-05 ENCOUNTER — TELEPHONE (OUTPATIENT)
Dept: RHEUMATOLOGY | Facility: CLINIC | Age: 71
End: 2021-05-05

## 2021-05-05 NOTE — TELEPHONE ENCOUNTER
The patient calling to stated what should she be taking. Currently taking Vitamin B 12 1000 mcg  The patient does not use mychart - please call the patient.       Suman Cerda, your lipid panel looks good.  Your B12 is a bit on the high side, you may want to do

## 2021-05-05 NOTE — TELEPHONE ENCOUNTER
Patient contacted. Patient wanted to know what lab values were abnormal. Provided information on lab values and the values for abnormal labs as requested. Explained that it appears one lab may still be pending.  Patient would like more information regarding

## 2021-05-06 ENCOUNTER — OFFICE VISIT (OUTPATIENT)
Dept: INTERNAL MEDICINE CLINIC | Facility: CLINIC | Age: 71
End: 2021-05-06
Payer: MEDICARE

## 2021-05-06 ENCOUNTER — NURSE TRIAGE (OUTPATIENT)
Dept: INTERNAL MEDICINE CLINIC | Facility: CLINIC | Age: 71
End: 2021-05-06

## 2021-05-06 VITALS
BODY MASS INDEX: 25.03 KG/M2 | DIASTOLIC BLOOD PRESSURE: 78 MMHG | HEART RATE: 66 BPM | HEIGHT: 62 IN | WEIGHT: 136 LBS | SYSTOLIC BLOOD PRESSURE: 146 MMHG

## 2021-05-06 DIAGNOSIS — Z86.16 HISTORY OF COVID-19: Primary | ICD-10-CM

## 2021-05-06 DIAGNOSIS — F41.9 ANXIETY: ICD-10-CM

## 2021-05-06 DIAGNOSIS — F32.89 OTHER DEPRESSION: ICD-10-CM

## 2021-05-06 PROCEDURE — 99214 OFFICE O/P EST MOD 30 MIN: CPT | Performed by: INTERNAL MEDICINE

## 2021-05-06 RX ORDER — ACETAMINOPHEN 500 MG
500 TABLET ORAL EVERY 6 HOURS PRN
COMMUNITY
End: 2021-11-02

## 2021-05-06 NOTE — PROGRESS NOTES
Patient ID: Brisa Dalton is a 79year old female. Patient presents with:  Weakness: Pt reports weakness, agitation, feeling emotional for a couple weeks. Pt reports symptoms since receiving second covid vaccine.         HISTORY OF PRESENT ILLNESS:   EKATERINA Monroy 50 MCG/ACT Nasal Suspension, fluticasone propionate 50 mcg/actuation nasal spray,suspension  SHAKE LIQUID AND USE 2 SPRAYS IN EACH NOSTRIL EVERY DAY AS DIRECTED, Disp: , Rfl:   •  ibuprofen 600 MG Oral Tab, ibuprofen 600 mg tablet  TAKE 1 TABLET BY MOUTH T Partner Violence:       Fear of Current or Ex-Partner:       Emotionally Abused:       Physically Abused:       Sexually Abused:         PHYSICAL EXAM:      05/06/21  1652   BP: 146/78   Pulse: 66   Weight: 136 lb (61.7 kg)   Height: 5' 2\" (1.575 m)

## 2021-05-06 NOTE — TELEPHONE ENCOUNTER
Action Requested: Summary for Provider     []  Critical Lab, Recommendations Needed  [] Need Additional Advice  []   FYI    []   Need Orders  [] Need Medications Sent to Pharmacy  []  Other     SUMMARY:     States she is feeling \"worse and worse\" since s

## 2021-05-07 ENCOUNTER — TELEPHONE (OUTPATIENT)
Dept: INTERNAL MEDICINE CLINIC | Facility: CLINIC | Age: 71
End: 2021-05-07

## 2021-05-07 DIAGNOSIS — F32.A DEPRESSION, UNSPECIFIED DEPRESSION TYPE: Primary | ICD-10-CM

## 2021-05-07 DIAGNOSIS — F41.9 ANXIETY: ICD-10-CM

## 2021-05-07 NOTE — TELEPHONE ENCOUNTER
Patient contacts clinic reporting unchanged symptoms, see previous telephone encounters and office visit with Dr. Irving Trammell 5/6. Josette Hobbs referral was placed, patient is unsure what to do next.   I advised I would place the navigator referral for her and a

## 2021-05-07 NOTE — TELEPHONE ENCOUNTER
Noted, could be adverse reaction to vaccine, if feeling better than follow-up as needed but if symptoms are worsening or persistent then agree ER or follow-up visit may be beneficial to get more information.

## 2021-05-07 NOTE — TELEPHONE ENCOUNTER
She can take b12 1000mcg every other day. Theres no toxic dose, excess b vitamins are urinated out, the main reason to reduce is to avoid unnecessary pills/medications.

## 2021-06-25 ENCOUNTER — OFFICE VISIT (OUTPATIENT)
Dept: INTERNAL MEDICINE CLINIC | Facility: CLINIC | Age: 71
End: 2021-06-25
Payer: MEDICARE

## 2021-06-25 VITALS
HEART RATE: 64 BPM | TEMPERATURE: 99 F | WEIGHT: 136 LBS | SYSTOLIC BLOOD PRESSURE: 132 MMHG | BODY MASS INDEX: 25.03 KG/M2 | HEIGHT: 62 IN | DIASTOLIC BLOOD PRESSURE: 75 MMHG

## 2021-06-25 DIAGNOSIS — M85.80 OSTEOPENIA WITH HIGH RISK OF FRACTURE: ICD-10-CM

## 2021-06-25 DIAGNOSIS — M25.511 CHRONIC RIGHT SHOULDER PAIN: ICD-10-CM

## 2021-06-25 DIAGNOSIS — E78.2 MIXED HYPERLIPIDEMIA: ICD-10-CM

## 2021-06-25 DIAGNOSIS — K21.00 GASTROESOPHAGEAL REFLUX DISEASE WITH ESOPHAGITIS, UNSPECIFIED WHETHER HEMORRHAGE: ICD-10-CM

## 2021-06-25 DIAGNOSIS — Z91.81 AT RISK FOR FALLS: ICD-10-CM

## 2021-06-25 DIAGNOSIS — G89.29 CHRONIC RIGHT SHOULDER PAIN: ICD-10-CM

## 2021-06-25 DIAGNOSIS — I65.21 STENOSIS OF RIGHT CAROTID ARTERY: ICD-10-CM

## 2021-06-25 DIAGNOSIS — Z00.00 ENCOUNTER FOR ANNUAL HEALTH EXAMINATION: Primary | ICD-10-CM

## 2021-06-25 PROBLEM — Z86.16 HISTORY OF COVID-19: Status: RESOLVED | Noted: 2021-05-06 | Resolved: 2021-06-25

## 2021-06-25 PROCEDURE — G0439 PPPS, SUBSEQ VISIT: HCPCS | Performed by: INTERNAL MEDICINE

## 2021-06-25 RX ORDER — ASPIRIN 81 MG/1
81 TABLET ORAL DAILY
Qty: 90 TABLET | Refills: 1 | Status: SHIPPED | OUTPATIENT
Start: 2021-06-25 | End: 2021-10-20

## 2021-06-25 RX ORDER — OMEPRAZOLE 20 MG/1
CAPSULE, DELAYED RELEASE ORAL
Qty: 104 CAPSULE | Refills: 0 | Status: SHIPPED | OUTPATIENT
Start: 2021-06-25 | End: 2021-07-02

## 2021-06-25 RX ORDER — SIMVASTATIN 40 MG
40 TABLET ORAL NIGHTLY
Qty: 90 TABLET | Refills: 3 | Status: SHIPPED | OUTPATIENT
Start: 2021-06-25

## 2021-06-25 NOTE — PATIENT INSTRUCTIONS
Zaida Osuna's SCREENING SCHEDULE   Tests on this list are recommended by your physician but may not be covered, or covered at this frequency, by your insurer. Please check with your insurance carrier before scheduling to verify coverage.    PREVENTATIVE S 03/11/2021      No recommendations at this time   Pap and Pelvic    Pap   Covered every 2 years for women at normal risk;  Annually if at high risk -  No recommendations at this time    Chlamydia Annually if high risk -  No recommendations at this time   Sc Advance Directives. Gastritis (Adult)    Gastritis is inflammation and irritation of the stomach lining. You can have it for a short time (acute) or be long lasting (chronic). Infection with bacteria called H pylori most often causes gastritis.  More t smoke.  Follow-up care  Follow up with your healthcare provider, or as advised by our staff. You may need testing to check for inflammation or an ulcer.   When to seek medical advice  Call your healthcare provider for any of the following:  · Stomach pain t stomach (nausea) or vomiting  Relieving your discomfort  You and your healthcare provider can work together to find the treatment options that best ease your symptoms. These may include lifestyle changes, medicine, and possibly surgery.   Many people find t food  · Mint, chocolate, onions, and tomatoes  · Peppermint  · Any other foods that seem to irritate your stomach or cause you pain  Relieve the pressure  Tips include the following:  · Eat smaller meals, even if you have to eat more often.   · Don’t lie do provider may prescribe them. They help control GERD symptoms. Side effects: Belly (abdominal) pain, diarrhea, upset stomach (nausea). Possible other side effects linked to long-term use and high doses.   Prokinetics  These medicines affect the movement of directed. Talk to your pediatrician regarding the use of this medicine in children. Special care may be needed. What side effects may I notice from receiving this medicine?   Side effects that you should report to your doctor or health care professional a at room temperature between 20 and 25 degrees C (68 and 77 degrees F). Protect from light and moisture. Throw away any unused medicine after the expiration date. What should I tell my health care provider before I take this medicine?   They need to know if

## 2021-06-25 NOTE — PROGRESS NOTES
HPI:   Joy Valente is a 79year old female who presents for a Medicare Subsequent Annual Wellness visit (Pt already had Initial Annual Wellness). Her last annual assessment has been over 1 year. Right carotid 2 plaques in bulb.  Feels some weird\" brain Last Cholesterol Labs:   Lab Results   Component Value Date    CHOLEST 165 04/19/2021    HDL 63 (H) 04/19/2021    LDL 82 04/19/2021    TRIG 98 04/19/2021          Last Chemistry Labs:   Lab Results   Component Value Date    AST 15 03/12/2021    AST 15 03 SYSTEMS:   Review of Systems   Constitutional: Negative for chills, diaphoresis, fatigue, fever and unexpected weight change. HENT: Negative for congestion, ear pain, rhinorrhea, sinus pressure and sore throat.     Eyes: Negative for pain and visual distu Musculoskeletal:         General: Normal range of motion. Cervical back: Normal range of motion and neck supple. Skin:     General: Skin is warm. Neurological:      General: No focal deficit present.       Mental Status: She is alert and oriented capsule (20 mg total) by mouth 2 (two) times daily before meals for 14 days, THEN 1 capsule (20 mg total) every morning. Plan  Chronic. Stable. As above.     At risk for falls  -     PHYSICAL THERAPY - INTERNAL  Chronic right shoulder pain  -     PHYSICA Component Value Date    CHOLEST 165 04/19/2021    HDL 63 (H) 04/19/2021    LDL 82 04/19/2021    TRIG 98 04/19/2021         Electrocardiogram (EKG)   Covered if needed at Welcome to Medicare, and non-screening if indicated for medical reasons 03/17/2021 this time    Tetanus Toxoid Not covered by Medicare Part B unless medically necessary (cut with metal); may be covered with your pharmacy prescription benefits -    Tetanus, Diptheria and Pertusis TD and TDaP Not covered by Medicare Part B -  No recommenda

## 2021-07-01 ENCOUNTER — TELEPHONE (OUTPATIENT)
Dept: INTERNAL MEDICINE CLINIC | Facility: CLINIC | Age: 71
End: 2021-07-01

## 2021-07-01 DIAGNOSIS — G89.29 CHRONIC RIGHT SHOULDER PAIN: Primary | ICD-10-CM

## 2021-07-01 DIAGNOSIS — M25.511 CHRONIC RIGHT SHOULDER PAIN: Primary | ICD-10-CM

## 2021-07-01 NOTE — TELEPHONE ENCOUNTER
Patient calling reports  LOV [id number]/[phone] ;  having pain to right shoulder , pain radiates from upper shoulder to right elbow, pain increases when she raises her arm, puts on her bra , brushing  back of her hair and at times keeps her awake , dull aching pain

## 2021-07-02 ENCOUNTER — OFFICE VISIT (OUTPATIENT)
Dept: CARDIOLOGY CLINIC | Facility: CLINIC | Age: 71
End: 2021-07-02
Payer: MEDICARE

## 2021-07-02 VITALS
RESPIRATION RATE: 18 BRPM | BODY MASS INDEX: 24.66 KG/M2 | HEART RATE: 67 BPM | HEIGHT: 62 IN | SYSTOLIC BLOOD PRESSURE: 127 MMHG | WEIGHT: 134 LBS | DIASTOLIC BLOOD PRESSURE: 76 MMHG

## 2021-07-02 DIAGNOSIS — I65.21 CAROTID ARTERY STENOSIS, UNILATERAL, RIGHT: ICD-10-CM

## 2021-07-02 DIAGNOSIS — E78.2 MIXED HYPERLIPIDEMIA: ICD-10-CM

## 2021-07-02 DIAGNOSIS — M25.511 RIGHT SHOULDER PAIN, UNSPECIFIED CHRONICITY: Primary | ICD-10-CM

## 2021-07-02 PROCEDURE — 99204 OFFICE O/P NEW MOD 45 MIN: CPT | Performed by: INTERNAL MEDICINE

## 2021-07-02 NOTE — PATIENT INSTRUCTIONS
Continue current medications. Exercise 20 to 30 minutes a day as discussed. Follow-up with me as needed in the future.

## 2021-07-02 NOTE — TELEPHONE ENCOUNTER
Pt was called and informed of Dr. Chiara Russell message below and she verbalized understanding. Pt was given the number to Physiatry [phone] and she will give them a call.

## 2021-07-02 NOTE — TELEPHONE ENCOUNTER
likely frozen shoulder, she should continue with range of motion exercises, is there another location she can go to for physical therapy? I placed an order for physiatry if she can follow-up with them may be should get relief sooner.   She can try 1 g of

## 2021-07-02 NOTE — TELEPHONE ENCOUNTER
Noted, will see how physiatry eval goes, if her pain worsens see me in office for eval of the cyst- I see Ct imaging of cysts in the liver but nothing on the neck, might have been an outside institution?

## 2021-07-02 NOTE — TELEPHONE ENCOUNTER
Pt called back, stated years ago  On a xray but not sure if it was a CT -a cyst was seen neck/shoulder, concern it may have increased in size

## 2021-07-02 NOTE — PROGRESS NOTES
Shelia Kunz is a 79year old female. Patient presents with:  Consult: discuss carotid screening  Hyperlipidemia    HPI:   Patient is here for a new patient appointment.   She underwent recent multiple testing for screening purposes which showed mild disease in adenopathy,no bruits  LUNGS: clear to auscultation  CARDIO: regular rate and rhythm  GI: good BS's,no masses, HSM or tenderness  EXTREMITIES: no cyanosis, clubbing or edema    Assessment   ASSESSMENT AND PLAN:     Problem List Items Addressed This Visit

## 2021-08-04 ENCOUNTER — OFFICE VISIT (OUTPATIENT)
Dept: PHYSICAL THERAPY | Facility: HOSPITAL | Age: 71
End: 2021-08-04
Attending: INTERNAL MEDICINE
Payer: MEDICARE

## 2021-08-04 DIAGNOSIS — Z91.81 AT RISK FOR FALLS: ICD-10-CM

## 2021-08-04 DIAGNOSIS — M25.511 CHRONIC RIGHT SHOULDER PAIN: ICD-10-CM

## 2021-08-04 DIAGNOSIS — G89.29 CHRONIC RIGHT SHOULDER PAIN: ICD-10-CM

## 2021-08-04 PROCEDURE — 97140 MANUAL THERAPY 1/> REGIONS: CPT

## 2021-08-04 PROCEDURE — 97161 PT EVAL LOW COMPLEX 20 MIN: CPT

## 2021-08-04 PROCEDURE — 97110 THERAPEUTIC EXERCISES: CPT

## 2021-08-04 NOTE — PROGRESS NOTES
SHOULDER EVALUATION:   Referring Physician: Dr. 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 Pt goals include decrease pain, return function of RUQ.   Past medical history was reviewed with Mar Flexion: R ; L wnl  Extension: R wnl; L wnl  Supination: R wnl, L wnl  Pronation: R wnl, L wnl     PROM R shoulder:   135* flex  138* abd  48* er  31* ir       Accessory motion: restricted to posterior and inferior glides GH joint, restricted scapular mobi steering wheel without pain  · Pt will improve shoulder strength throughout to 4+/5 to improve function with lifting and carrying objects   · Pt will demonstrate increased mid/low trap strength to 4/5 to promote improved shoulder mechanics and stabilizatio

## 2021-08-09 ENCOUNTER — OFFICE VISIT (OUTPATIENT)
Dept: PHYSICAL THERAPY | Facility: HOSPITAL | Age: 71
End: 2021-08-09
Attending: INTERNAL MEDICINE
Payer: MEDICARE

## 2021-08-09 PROCEDURE — 97110 THERAPEUTIC EXERCISES: CPT

## 2021-08-09 PROCEDURE — 97140 MANUAL THERAPY 1/> REGIONS: CPT

## 2021-08-09 NOTE — PROGRESS NOTES
Dx: At risk for falls (Z91.81)  Chronic right shoulder pain (M25.511,G89.29)            Insurance (Authorized # of Visits):  [facility] 2/10           Authorizing Physician: Dr. Madison Pickens  Next MD visit: none scheduled  Fall Risk: high           Precautions: HLD, <50 period. Treatment will include: Manual Therapy, Neuromuscular Re-education, Therapeutic Activities, Therapeutic Exercise and Home Exercise Program instruction  Date: 8/9/2021  TX#: 2/10 Date:                 TX#: 3/ Date:                 TX#: 4/ Date:

## 2021-08-11 ENCOUNTER — TELEPHONE (OUTPATIENT)
Dept: PODIATRY CLINIC | Facility: CLINIC | Age: 71
End: 2021-08-11

## 2021-08-11 ENCOUNTER — OFFICE VISIT (OUTPATIENT)
Dept: PHYSICAL THERAPY | Facility: HOSPITAL | Age: 71
End: 2021-08-11
Attending: INTERNAL MEDICINE
Payer: MEDICARE

## 2021-08-11 PROCEDURE — 97140 MANUAL THERAPY 1/> REGIONS: CPT

## 2021-08-11 PROCEDURE — 97110 THERAPEUTIC EXERCISES: CPT

## 2021-08-11 NOTE — TELEPHONE ENCOUNTER
Spoke to pt and informed her of Tracee's message and instructions. Pt states she will think about this and decide if she wants to proceed. Pt states she will call back to schedule appt with Radha Ordonez if she decides to proceed with surgery.

## 2021-08-11 NOTE — TELEPHONE ENCOUNTER
Pt called stating pt had an appointment in march 2021. Discussed surgery. Pt has question. How long would pt need to off the foot, recovery and any restrictions.   Please call pt before 2:15 pm or after 4:00pm.

## 2021-08-11 NOTE — PROGRESS NOTES
Dx: At risk for falls (Z91.81)  Chronic right shoulder pain (M25.511,G89.29)            [facility]Authorized # of Visits):  [facility]           Authorizing Physician: Dr. Tanja Bosworth  Next MD visit: none scheduled  Fall Risk: high           Precautions: HLD, <50 TX#: 3/10 Date:                 TX#: 4/ Date:                 TX#: 5/ Date:    Tx#: 6/   MT:   Long axis traction   Posterior glide gr II  Inferior glide gr II  12 min MT:   RTC tendon STM   Posterior glide gr II  Inferior glide gr II  12 min

## 2021-08-11 NOTE — TELEPHONE ENCOUNTER
Outpatient procedure, week 1 change dressing, week 2 suture removal and xray , surgical shoe a total of 5 weeks, back in closed shoe at that time, swelling post op usually 8`10 weeks. It would be best if she could come in to review.  Thanks scr

## 2021-08-16 ENCOUNTER — OFFICE VISIT (OUTPATIENT)
Dept: PHYSICAL THERAPY | Facility: HOSPITAL | Age: 71
End: 2021-08-16
Attending: INTERNAL MEDICINE
Payer: MEDICARE

## 2021-08-16 PROCEDURE — 97140 MANUAL THERAPY 1/> REGIONS: CPT

## 2021-08-16 PROCEDURE — 97110 THERAPEUTIC EXERCISES: CPT

## 2021-08-16 NOTE — PROGRESS NOTES
Dx: At risk for falls (Z91.81)  Chronic right shoulder pain (M25.511,G89.29)            Insurance (Authorized # of Visits):  [facility]           Authorizing Physician: Dr. Colin Hassan  Next MD visit: none scheduled  Fall Risk: high           Precautions: HLD, <50 will include: Manual Therapy, Neuromuscular Re-education, Therapeutic Activities, Therapeutic Exercise and Home Exercise Program instruction  Date: 8/9/2021  TX#: 2/10 Date: 8/11/2021                 TX#: 3/10 Date: 8/16/2021                 TX#: 4/10 Date

## 2021-08-18 ENCOUNTER — OFFICE VISIT (OUTPATIENT)
Dept: PHYSICAL THERAPY | Facility: HOSPITAL | Age: 71
End: 2021-08-18
Attending: INTERNAL MEDICINE
Payer: MEDICARE

## 2021-08-18 PROCEDURE — 97110 THERAPEUTIC EXERCISES: CPT

## 2021-08-18 PROCEDURE — 97140 MANUAL THERAPY 1/> REGIONS: CPT

## 2021-08-18 NOTE — PROGRESS NOTES
Dx: At risk for falls (Z91.81)  Chronic right shoulder pain (M25.511,G89.29)            Insurance (Authorized # of Visits):  [employer] 5/10           Authorizing Physician: Dr. Magaly Ghotra  Next MD visit: none scheduled  Fall Risk: high           Precautions: HLD, <50 Exercise and Home Exercise Program instruction  Date: 8/9/2021  TX#: 2/10 Date: 8/11/2021                 TX#: 3/10 Date: 8/16/2021                 TX#: 4/10 Date: 8/18/2021                 TX#: 5/10 Date:    Tx#: 6/   MT:   Long axis traction   Posterior g

## 2021-08-23 ENCOUNTER — OFFICE VISIT (OUTPATIENT)
Dept: PHYSICAL THERAPY | Facility: HOSPITAL | Age: 71
End: 2021-08-23
Attending: INTERNAL MEDICINE
Payer: MEDICARE

## 2021-08-23 PROCEDURE — 97110 THERAPEUTIC EXERCISES: CPT

## 2021-08-23 PROCEDURE — 97140 MANUAL THERAPY 1/> REGIONS: CPT

## 2021-08-23 NOTE — PROGRESS NOTES
Dx: At risk for falls (Z91.81)  Chronic right shoulder pain (M25.511,G89.29)            Insurance (Authorized # of Visits):  [employer] 6/10           Authorizing Physician: Dr. Cherylene League  Next MD visit: none scheduled  Fall Risk: high           Precautions: HLD, <50 HEP to maintain progress achieved in PT     Plan:   Patient will be seen for 1-2 x/week or a total of 10 visits over a 90 day period. Treatment will include: Manual Therapy, Neuromuscular Re-education, Therapeutic Activities, Therapeutic Exercise and Home retraction   8/16: rows, band ER, aarom flexion and abduction  8/18: horiz abd, ER stretch, pec stretch    Charges: MT - 1, TE - 2       Total Timed Treatment: 45 min  Total Treatment Time: 45 min

## 2021-08-25 ENCOUNTER — APPOINTMENT (OUTPATIENT)
Dept: PHYSICAL THERAPY | Facility: HOSPITAL | Age: 71
End: 2021-08-25
Attending: INTERNAL MEDICINE
Payer: MEDICARE

## 2021-08-30 ENCOUNTER — TELEPHONE (OUTPATIENT)
Dept: INTERNAL MEDICINE CLINIC | Facility: CLINIC | Age: 71
End: 2021-08-30

## 2021-08-30 ENCOUNTER — OFFICE VISIT (OUTPATIENT)
Dept: PHYSICAL THERAPY | Facility: HOSPITAL | Age: 71
End: 2021-08-30
Attending: INTERNAL MEDICINE
Payer: MEDICARE

## 2021-08-30 DIAGNOSIS — M54.50 CHRONIC BILATERAL LOW BACK PAIN WITHOUT SCIATICA: ICD-10-CM

## 2021-08-30 DIAGNOSIS — M17.12 PRIMARY OSTEOARTHRITIS OF LEFT KNEE: ICD-10-CM

## 2021-08-30 DIAGNOSIS — Z91.81 AT RISK FOR FALLS: Primary | ICD-10-CM

## 2021-08-30 DIAGNOSIS — G89.29 CHRONIC BILATERAL LOW BACK PAIN WITHOUT SCIATICA: ICD-10-CM

## 2021-08-30 PROCEDURE — 97140 MANUAL THERAPY 1/> REGIONS: CPT

## 2021-08-30 PROCEDURE — 97110 THERAPEUTIC EXERCISES: CPT

## 2021-08-30 NOTE — TELEPHONE ENCOUNTER
Pt stts she needs a new physical therapy order. Pt said it should say at risk for fall due to lower back and knee pain and tight hamstrings.  Please advise

## 2021-08-30 NOTE — PROGRESS NOTES
Dx: At risk for falls (Z91.81)  Chronic right shoulder pain (M25.511,G89.29)            Insurance (Authorized # of Visits):  [employer] 7/10           Authorizing Physician: Dr. Magaly Ghotra  Next MD visit: none scheduled  Fall Risk: high           Precautions: HLD, <50 will include: Manual Therapy, Neuromuscular Re-education, Therapeutic Activities, Therapeutic Exercise and Home Exercise Program instruction  Date: 8/9/2021  TX#: 2/10 Date: 8/11/2021                 TX#: 3/10 Date: 8/16/2021                 TX#: 4/10 Date w/ wand x15   IR behind back stretch w/ towel x15  Band ER RTB 2x12  Pendulums x20 each way   Flexion overhead press 1# 2x8        HEP: SL ER, wand flexion stretch, seated flexion stretching, scap retraction   8/16: rows, band ER, aarom flexion and abducti

## 2021-09-03 RX ORDER — FLUTICASONE PROPIONATE 50 MCG
SPRAY, SUSPENSION (ML) NASAL
Qty: 48 G | Refills: 0 | Status: SHIPPED | OUTPATIENT
Start: 2021-09-03

## 2021-09-08 ENCOUNTER — NURSE TRIAGE (OUTPATIENT)
Dept: INTERNAL MEDICINE CLINIC | Facility: CLINIC | Age: 71
End: 2021-09-08

## 2021-09-08 NOTE — TELEPHONE ENCOUNTER
Patient seeking appt. Was at party with granddaughter about a month ago. Hx of covid 11/2020; fully vaccinated. Patient feels \"lowsy\". Feels more tired, however today feels better than previous days.    Sometimes notices some memory problems;

## 2021-09-09 NOTE — PROGRESS NOTES
Virtual Telephone Check-In    Asterzhou Yessica verbally consents to a Virtual/Telephone Check-In visit on 09/09/21. Patient understands and accepts financial responsibility for any deductible, co-insurance and/or co-pays associated with this service.     Nata taking states she will start taking to see if improvment. Eating out frequently. ? Possible gerd. Pt refused trial meds for gerd. Has anxiety. Sees therapist. Looking to change therapist. Denies suicidal thoughts or planning.     Has tremors in L hand x spicy foods or highly acidic foods (citrus, onions, tomatoes, etc  -don’t lay down 20-30 minutes after eating or drinking  -use wedge pillow under mattress or use cinder blocks to elevate head of bed-this will prevent reflux at night  -take medications as

## 2021-09-10 ENCOUNTER — LAB ENCOUNTER (OUTPATIENT)
Dept: LAB | Facility: HOSPITAL | Age: 71
End: 2021-09-10
Attending: NURSE PRACTITIONER
Payer: MEDICARE

## 2021-09-10 ENCOUNTER — OFFICE VISIT (OUTPATIENT)
Dept: PHYSICAL THERAPY | Facility: HOSPITAL | Age: 71
End: 2021-09-10
Attending: INTERNAL MEDICINE
Payer: MEDICARE

## 2021-09-10 DIAGNOSIS — J02.9 SORE THROAT: ICD-10-CM

## 2021-09-10 LAB — BETA STREP GRP A SCREEN: NEGATIVE

## 2021-09-10 PROCEDURE — 87430 STREP A AG IA: CPT

## 2021-09-10 PROCEDURE — 97110 THERAPEUTIC EXERCISES: CPT

## 2021-09-10 NOTE — PROGRESS NOTES
Dx: At risk for falls (Z91.81)  Chronic right shoulder pain (M25.511,G89.29)            Insurance (Authorized # of Visits):  [facility] 8/10           Authorizing Physician: Dr. Nunez ref.  provider found  Next MD visit: none scheduled  Fall Risk: high           Prec Manual Therapy, Neuromuscular Re-education, Therapeutic Activities, Therapeutic Exercise and Home Exercise Program instruction  Date: 8/16/2021                 TX#: 4/10 Date: 8/18/2021                 TX#: 5/10 Date: 8/23/2021   Tx#: 6/10 8/30/2021  7/10

## 2021-09-13 ENCOUNTER — NURSE TRIAGE (OUTPATIENT)
Dept: INTERNAL MEDICINE CLINIC | Facility: CLINIC | Age: 71
End: 2021-09-13

## 2021-09-15 ENCOUNTER — OFFICE VISIT (OUTPATIENT)
Dept: INTERNAL MEDICINE CLINIC | Facility: CLINIC | Age: 71
End: 2021-09-15
Payer: MEDICARE

## 2021-09-15 VITALS
WEIGHT: 136.63 LBS | DIASTOLIC BLOOD PRESSURE: 78 MMHG | TEMPERATURE: 97 F | HEART RATE: 69 BPM | BODY MASS INDEX: 25.14 KG/M2 | SYSTOLIC BLOOD PRESSURE: 137 MMHG | HEIGHT: 62 IN

## 2021-09-15 DIAGNOSIS — I10 HYPERTENSION GOAL BP (BLOOD PRESSURE) < 130/80: ICD-10-CM

## 2021-09-15 DIAGNOSIS — M62.838 MUSCLE SPASMS OF NECK: ICD-10-CM

## 2021-09-15 DIAGNOSIS — M54.2 NECK PAIN ON LEFT SIDE: Primary | ICD-10-CM

## 2021-09-15 DIAGNOSIS — Z71.3 DIETARY COUNSELING: ICD-10-CM

## 2021-09-15 DIAGNOSIS — R09.81 SINUS CONGESTION: ICD-10-CM

## 2021-09-15 PROCEDURE — 99214 OFFICE O/P EST MOD 30 MIN: CPT | Performed by: INTERNAL MEDICINE

## 2021-09-15 RX ORDER — TELMISARTAN 20 MG/1
20 TABLET ORAL NIGHTLY
Qty: 90 TABLET | Refills: 3 | Status: SHIPPED | OUTPATIENT
Start: 2021-09-15 | End: 2021-10-20

## 2021-09-15 RX ORDER — CYCLOBENZAPRINE HCL 5 MG
TABLET ORAL
Qty: 30 TABLET | Refills: 1 | Status: SHIPPED | OUTPATIENT
Start: 2021-09-15 | End: 2021-10-28 | Stop reason: ALTCHOICE

## 2021-09-15 RX ORDER — CYCLOBENZAPRINE HCL 5 MG
TABLET ORAL
Qty: 30 TABLET | Refills: 0 | Status: SHIPPED | OUTPATIENT
Start: 2021-09-15 | End: 2021-09-15

## 2021-09-15 NOTE — PROGRESS NOTES
History of Present Illness   Patient ID: Fredo Crowley is a 70year old female. Today's Date: 09/15/21  Chief Complaint: Neck Pain    Berta Hood RN     CD    9/13/21 2:50 PM  Note  Action Requested: Summary for Provider      []?   Critical Lab, Recommenda unsure of reaction  Chlorthalidone          DIZZINESS  Belladonna Alk-Phen*    UNKNOWN    Comment:pt unsure of reaction  Latex                   RASH  Review of Systems   Constitutional: Negative for unexpected weight change.    HENT: Positive for congestio process tenderness. Skin:     Coloration: Skin is not jaundiced. Neurological:      General: No focal deficit present. Mental Status: She is alert and oriented to person, place, and time. Mental status is at baseline.    Psychiatric:         Mood a spasms. May cause sedation; no driving. Maximum dose: 10mg every 8 hours. Dispense: 30 tablet;  Refill: 1  Plan  Symptoms consistent with spasm, likely due to sleep position, she is already improving, continue with conservative therapy and can use Flexeril still exist.    ----------------------------------------- PATIENT INSTRUCTIONS-----------------------------------------     Patient Instructions       General Neck and Back Pain    Both neck and back pain are usually caused by injury to the muscles or liga incorrectly or too aggressively. · Sudden twisting, bending or stretching from an accident (car or fall), or accidental movement.   · Poor posture  · Poor conditioning, lack of regular exercise  · Spinal disc disease or arthritis  · Stress  · Pregnancy, or pain, unless another pain medicine was prescribed. Talk with your doctor first if you have chronic conditions like diabetes, liver or kidney disease, stomach ulcers, gastrointestinal bleeding, or are taking blood thinner medicines.   · Be careful if you are 6/1/2019  © 1038-9165 [employer]. All rights reserved. This information is not intended as a substitute for professional medical care. Always follow your healthcare professional's instructions.         Understanding Cervical Strain    There a vision  When to call your healthcare provider  Call your healthcare provider right away if you have any of these:  · Fever of 100.4°F (38°C) or higher, or as directed by your provider  · Chills  · Pain or stiffness that gets worse  · Symptoms that don’t ge

## 2021-09-15 NOTE — PATIENT INSTRUCTIONS
General Neck and Back Pain    Both neck and back pain are usually caused by injury to the muscles or ligaments of the spine. Sometimes the disks that separate each bone of the spine may cause pain by pressing on a nearby nerve.  Back and neck pain may matthias posture  · Poor conditioning, lack of regular exercise  · Spinal disc disease or arthritis  · Stress  · Pregnancy, or illness like appendicitis, bladder or kidney infection, pelvic infections   Home care  · For neck pain: Use a comfortable pillow that supp kidney disease, stomach ulcers, gastrointestinal bleeding, or are taking blood thinner medicines. · Be careful if you are given pain medicines, narcotics, or medicine for muscle spasm.  They can cause drowsiness, and can affect your coordination, reflexes, medical care. Always follow your healthcare professional's instructions. Understanding Cervical Strain    There are 7 bones (vertebrae) in the neck that are part of the spine. These are called the cervical spine.  Cervical strain is a medical term fo or higher, or as directed by your provider  · Chills  · Pain or stiffness that gets worse  · Symptoms that don’t get better, or get worse  · Numbness, tingling, weakness or shooting pains into the arms or legs  · New symptoms  [employer] last reviewed this e

## 2021-09-29 ENCOUNTER — OFFICE VISIT (OUTPATIENT)
Dept: PHYSICAL THERAPY | Facility: HOSPITAL | Age: 71
End: 2021-09-29
Attending: INTERNAL MEDICINE
Payer: MEDICARE

## 2021-09-29 PROCEDURE — 97110 THERAPEUTIC EXERCISES: CPT

## 2021-09-29 NOTE — PROGRESS NOTES
Dx: At risk for falls (Z91.81)  Chronic right shoulder pain (M25.511,G89.29)            Insurance (Authorized # of Visits):  [facility]           Authorizing Physician: Dr. Nunez ref.  provider found  Next MD visit: none scheduled  Fall Risk: high           Prec and carrying objects   · Pt will demonstrate increased mid/low trap strength to 4/5 to promote improved shoulder mechanics and stabilization with lifting and reaching   · Pt will be independent and compliant with comprehensive HEP to maintain progress achi supine YTB 2x10   D2 flexion YTB 3x10   Body blade 3x15s ER, 4x15s flex  Sh ext RTB 2x10  tricep ext RTB 2x10  wall push up x10, table x10   TE:   Quad rocking x15, cue to hold end range stretch   Supine shoulder flexion aarom stretch x15  Supine shoulder

## 2021-10-07 ENCOUNTER — TELEPHONE (OUTPATIENT)
Dept: PHYSICAL THERAPY | Facility: HOSPITAL | Age: 71
End: 2021-10-07

## 2021-10-07 ENCOUNTER — APPOINTMENT (OUTPATIENT)
Dept: PHYSICAL THERAPY | Facility: HOSPITAL | Age: 71
End: 2021-10-07
Attending: INTERNAL MEDICINE
Payer: MEDICARE

## 2021-10-18 ENCOUNTER — OFFICE VISIT (OUTPATIENT)
Dept: PHYSICAL THERAPY | Facility: HOSPITAL | Age: 71
End: 2021-10-18
Attending: INTERNAL MEDICINE
Payer: MEDICARE

## 2021-10-18 PROCEDURE — 97110 THERAPEUTIC EXERCISES: CPT

## 2021-10-18 NOTE — PROGRESS NOTES
Lupe  Pt has attended 10 visits in Physical Therapy.      Dx: At risk for falls (Z91.81)  Chronic right shoulder pain (M25.511,G89.29)            Insurance (Authorized # of Visits):  [employer] 10/10           Authorizing Physician: Dr. Stoney Sterling Flexion: R 4-/5; L 5/5  Abduction: R 4-/5; L 5/5  ER: R 3+/5; L 5/5  IR: R 5-/5; L 5/5 Flexion: R 5/5; L 5/5  Extension: R 5/5; L 5/5  Low trap: R 4-/5, L 4-/5    Mid trap: R 4-/5, L 4-/5                Assessment:  At this time, patient demonstrates impr Therapy, Neuromuscular Re-education, Therapeutic Activities, Therapeutic Exercise and Home Exercise Program instruction  Date: 8/16/2021                 TX#: 4/10 Date: 8/18/2021                 TX#: 5/10 Date: 8/23/2021   Tx#: 6/10 8/30/2021  7/10  9/10/2 up 2x10   TE:   ER w/ wand self stretching review  Hands behind head LLLD stretch for functional ER, gentle contraction for autogenic inhibition   D2 flex/ext review YTB-- self anchor and floor anchor x10  Quad rocking over EOB for sh flexion stretch and m

## 2021-10-20 ENCOUNTER — OFFICE VISIT (OUTPATIENT)
Dept: ORTHOPEDICS CLINIC | Facility: CLINIC | Age: 71
End: 2021-10-20
Payer: MEDICARE

## 2021-10-20 ENCOUNTER — OFFICE VISIT (OUTPATIENT)
Dept: INTERNAL MEDICINE CLINIC | Facility: CLINIC | Age: 71
End: 2021-10-20
Payer: MEDICARE

## 2021-10-20 VITALS
HEIGHT: 62 IN | WEIGHT: 136 LBS | SYSTOLIC BLOOD PRESSURE: 125 MMHG | HEART RATE: 65 BPM | DIASTOLIC BLOOD PRESSURE: 78 MMHG | TEMPERATURE: 98 F | BODY MASS INDEX: 25.03 KG/M2

## 2021-10-20 DIAGNOSIS — K21.9 GASTROESOPHAGEAL REFLUX DISEASE, UNSPECIFIED WHETHER ESOPHAGITIS PRESENT: ICD-10-CM

## 2021-10-20 DIAGNOSIS — M17.12 PRIMARY OSTEOARTHRITIS OF LEFT KNEE: Primary | ICD-10-CM

## 2021-10-20 DIAGNOSIS — I65.21 STENOSIS OF RIGHT CAROTID ARTERY: ICD-10-CM

## 2021-10-20 DIAGNOSIS — H61.21 IMPACTED CERUMEN OF RIGHT EAR: ICD-10-CM

## 2021-10-20 DIAGNOSIS — I10 HYPERTENSION GOAL BP (BLOOD PRESSURE) < 130/80: Primary | ICD-10-CM

## 2021-10-20 DIAGNOSIS — Z23 FLU VACCINE NEED: ICD-10-CM

## 2021-10-20 PROCEDURE — 99214 OFFICE O/P EST MOD 30 MIN: CPT | Performed by: INTERNAL MEDICINE

## 2021-10-20 PROCEDURE — G0008 ADMIN INFLUENZA VIRUS VAC: HCPCS | Performed by: INTERNAL MEDICINE

## 2021-10-20 PROCEDURE — 99204 OFFICE O/P NEW MOD 45 MIN: CPT | Performed by: ORTHOPAEDIC SURGERY

## 2021-10-20 PROCEDURE — 90662 IIV NO PRSV INCREASED AG IM: CPT | Performed by: INTERNAL MEDICINE

## 2021-10-20 RX ORDER — ASPIRIN 81 MG/1
81 TABLET ORAL DAILY
Qty: 365 TABLET | Refills: 3 | Status: SHIPPED | OUTPATIENT
Start: 2021-10-20

## 2021-10-20 NOTE — PROGRESS NOTES
History of Present Illness   Patient ID: Shelia Kunz is a 70year old female. Today's Date: 10/20/21  Chief Complaint: Hypertension      HPI   1.   She has hypertension that is well controlled on diet and exercise, she took telmisartan 20 mg but this cause 63 mg/dL      Total Cholesterol: 165 mg/dL    Physical Exam  Vitals and nursing note reviewed. Constitutional:       General: She is not in acute distress. Appearance: Normal appearance. HENT:      Head: Normocephalic.       Right Ear: External ear continue, if blood pressure reaches greater than 140/80 may need to consider lower dose telmisartan or alternative. We will follow-up in about 6 to 8 months for reevaluation, sooner if blood pressure begins to rise.     2. Flu vaccine need  -     FLU VACC exist.    ----------------------------------------- PATIENT INSTRUCTIONS-----------------------------------------     Patient Instructions       Gastritis (Adult)    Gastritis is inflammation and irritation of the stomach lining.  You can have it for a shor smoking. Smoking can irritate the stomach and delay healing. As much as possible, stay away from second hand smoke. Follow-up care  Follow up with your healthcare provider, or as advised by our staff.  You may need testing to check for inflammation or an u over, or lie down  · Trouble swallowing or pain when swallowing  · A dry, long-term (chronic) cough  · Upset stomach (nausea) or vomiting  Relieving your discomfort  You and your healthcare provider can work together to find the treatment options that best symptoms:  · Coffee, tea, and carbonated drinks (with and without caffeine)  · Fatty, fried, or spicy food  · Mint, chocolate, onions, and tomatoes  · Peppermint  · Any other foods that seem to irritate your stomach or cause you pain  Relieve the pressure short time, or longer to treat certain conditions. You can buy some of them over the counter. Or your provider may prescribe them. They help control GERD symptoms. Side effects: Belly (abdominal) pain, diarrhea, upset stomach (nausea).  Possible other side healthcare professional, follow the directions you were given. Do not take your medicine more often than directed. Talk to your pediatrician regarding the use of this medicine in children. Special care may be needed.   What side effects may I notice from r take double or extra doses. Where should I keep my medicine? Keep out of the reach of children. Store at room temperature between 20 and 25 degrees C (68 and 77 degrees F). Protect from light and moisture.  Throw away any unused medicine after the expira

## 2021-10-20 NOTE — PATIENT INSTRUCTIONS
Gastritis (Adult)    Gastritis is inflammation and irritation of the stomach lining. You can have it for a short time (acute) or be long lasting (chronic). Infection with bacteria called H pylori most often causes gastritis.  More than a third of people i with your healthcare provider, or as advised by our staff. You may need testing to check for inflammation or an ulcer.   When to seek medical advice  Call your healthcare provider for any of the following:  · Stomach pain that gets worse or moves to the low vomiting  Relieving your discomfort  You and your healthcare provider can work together to find the treatment options that best ease your symptoms. These may include lifestyle changes, medicine, and possibly surgery.   Many people find their GERD symptoms d onions, and tomatoes  · Peppermint  · Any other foods that seem to irritate your stomach or cause you pain  Relieve the pressure  Tips include the following:  · Eat smaller meals, even if you have to eat more often. · Don’t lie down right after you eat.  Kimber Meth They help control GERD symptoms. Side effects: Belly (abdominal) pain, diarrhea, upset stomach (nausea). Possible other side effects linked to long-term use and high doses. Prokinetics  These medicines affect the movement of the digestive tract.  They may pediatrician regarding the use of this medicine in children. Special care may be needed. What side effects may I notice from receiving this medicine?   Side effects that you should report to your doctor or health care professional as soon as possible:  · a between 20 and 25 degrees C (68 and 77 degrees F). Protect from light and moisture. Throw away any unused medicine after the expiration date. What should I tell my health care provider before I take this medicine?   They need to know if you have any of the

## 2021-10-20 NOTE — PROGRESS NOTES
NURSING INTAKE COMMENTS: Patient presents with:  Knee Pain: Left - onset a while ago - has deformity in the L leg - no injury - has x-rays in the system - states the knee gives out - rates pain as 4/10 on and off       HPI: This 70year old female presents UNKNOWN    Comment:pt unsure of reaction  Latex                   RASH  Family History   Problem Relation Age of Onset   • Breast Cancer Sister         61-69   • Breast Cancer Sister 36       Social History    Occupational History      Not on file    Tobac pain with passive range of motion of the hip. Passive straight leg raising produces no significant pain. She is able to actively straight leg raise with good power  Neurological: Light touch and pinprick sensation intact throughout the lower extremities.

## 2021-10-27 ENCOUNTER — OFFICE VISIT (OUTPATIENT)
Dept: PHYSICAL THERAPY | Facility: HOSPITAL | Age: 71
End: 2021-10-27
Attending: INTERNAL MEDICINE
Payer: MEDICARE

## 2021-10-27 DIAGNOSIS — M54.50 CHRONIC BILATERAL LOW BACK PAIN WITHOUT SCIATICA: ICD-10-CM

## 2021-10-27 DIAGNOSIS — M17.12 PRIMARY OSTEOARTHRITIS OF LEFT KNEE: ICD-10-CM

## 2021-10-27 DIAGNOSIS — G89.29 CHRONIC BILATERAL LOW BACK PAIN WITHOUT SCIATICA: ICD-10-CM

## 2021-10-27 DIAGNOSIS — Z91.81 AT RISK FOR FALLS: ICD-10-CM

## 2021-10-27 PROCEDURE — 97162 PT EVAL MOD COMPLEX 30 MIN: CPT

## 2021-10-27 PROCEDURE — 97110 THERAPEUTIC EXERCISES: CPT

## 2021-10-27 NOTE — PROGRESS NOTES
SPINE EVALUATION:   Referring Physician: Dr. Chiara Russell  Diagnosis:  At risk for falls (Z91.81)  Primary osteoarthritis of left knee (M17.12)  Chronic bilateral low back pain without sciatica (M54.50,G89.29)     Date of Service: 10/27/2021     PATIENT SUMMARY buckling/giving out   Alleviating: unknown     Pt goals include decrease back pain, improve lower quarter strength and mobility. Past medical history was reviewed with [facility]. Significant findings include No past medical history on file.      HTN has been st and lateral deviation; no point TTP through lumbar spine but increased tone bilateral QL and paraspinals; mild TTP to medial L knee    Strength: (* denotes performed with pain)  LE   Hip flexion (L2): R 4/5; L 4/5  Hip abduction: R 3/5; L 2+/5  Hip Extensi full and painless to allow increase ease with bending forward to don shoes   · Pt will have decreased paraspinal mm tension to tolerate standing >45 minutes for work and home activities   · Pt will demonstrate improved core strength to be able to perform l therapist: Eusebio Chavez    Physician's certification required: Yes  I certify the need for these services furnished under this plan of treatment and while under my care.     X___________________________________________________ Date____________________

## 2021-10-28 ENCOUNTER — OFFICE VISIT (OUTPATIENT)
Dept: PODIATRY CLINIC | Facility: CLINIC | Age: 71
End: 2021-10-28
Payer: MEDICARE

## 2021-10-28 VITALS — BODY MASS INDEX: 25.03 KG/M2 | HEIGHT: 62 IN | WEIGHT: 136 LBS

## 2021-10-28 DIAGNOSIS — M21.619 BUNION: Primary | ICD-10-CM

## 2021-10-28 DIAGNOSIS — M20.42 HAMMER TOE OF LEFT FOOT: ICD-10-CM

## 2021-10-28 PROCEDURE — 99213 OFFICE O/P EST LOW 20 MIN: CPT | Performed by: PODIATRIST

## 2021-10-28 RX ORDER — ACETAMINOPHEN 500 MG
500 TABLET ORAL
COMMUNITY

## 2021-10-28 NOTE — PROGRESS NOTES
HPI:    Patient ID: Pau Jaquez is a 70year old female. 66-year-old female presents to the office today having not been seen in about 7 8 months. Patient would like to discuss further the possibility of surgery on her left foot.   She is aware that the b etiology, progression, and the surgical considerations for this deformity. With further discussion and review of options of treatment she added that she is in the process of having some bracing and physical therapy on her left knee.   I instructed this pat

## 2021-11-01 ENCOUNTER — OFFICE VISIT (OUTPATIENT)
Dept: PHYSICAL THERAPY | Facility: HOSPITAL | Age: 71
End: 2021-11-01
Attending: INTERNAL MEDICINE
Payer: MEDICARE

## 2021-11-01 PROCEDURE — 97110 THERAPEUTIC EXERCISES: CPT

## 2021-11-01 PROCEDURE — 97112 NEUROMUSCULAR REEDUCATION: CPT

## 2021-11-01 NOTE — PROGRESS NOTES
Dx: At risk for falls (Z91.81)  Primary osteoarthritis of left knee (M17.12)  Chronic bilateral low back pain without sciatica (M54.50,G89.29)             Insurance (Authorized # of Visits):  [facility] 2/12           Authorizing Physician: Dr. Rebecca Lundborg Next MD vis will demonstrate improved core strength to be able to perform lifting with <3/10 pain   · Pt will be independent and compliant with comprehensive HEP to maintain progress achieved in PT      · Pt will improve knee extension ROM to 0 deg to allow proper jackelin

## 2021-11-02 ENCOUNTER — OFFICE VISIT (OUTPATIENT)
Dept: NEUROLOGY | Facility: CLINIC | Age: 71
End: 2021-11-02
Payer: MEDICARE

## 2021-11-02 VITALS
HEART RATE: 64 BPM | WEIGHT: 136 LBS | DIASTOLIC BLOOD PRESSURE: 72 MMHG | BODY MASS INDEX: 25.03 KG/M2 | SYSTOLIC BLOOD PRESSURE: 116 MMHG | HEIGHT: 62 IN

## 2021-11-02 DIAGNOSIS — R25.1 TREMOR: Primary | ICD-10-CM

## 2021-11-02 PROCEDURE — 3008F BODY MASS INDEX DOCD: CPT | Performed by: OTHER

## 2021-11-02 PROCEDURE — 3074F SYST BP LT 130 MM HG: CPT | Performed by: OTHER

## 2021-11-02 PROCEDURE — 3078F DIAST BP <80 MM HG: CPT | Performed by: OTHER

## 2021-11-02 PROCEDURE — 99203 OFFICE O/P NEW LOW 30 MIN: CPT | Performed by: OTHER

## 2021-11-02 NOTE — PATIENT INSTRUCTIONS
-Follow up in 6 months or sooner if needed.     -If you develop sudden onset loss of vision, double vision, room spinning/world spinning sensation, inability to speak or understand others who are speaking to you, slurred speech, balance problems, weakness o

## 2021-11-02 NOTE — PROGRESS NOTES
[facility]  [address]  [phone]              Date: November 2, 2021  Patient Name: Natalie Poole   MRN: [id number]    Reason for Evaluation: Tremor    HPI:     Natalie Poole is a 70year old woman w daily.), Disp: 48 g, Rfl: 0  simvastatin 40 MG Oral Tab, Take 1 tablet (40 mg total) by mouth nightly.  FOR CHOLESTEROL., Disp: 90 tablet, Rfl: 3  Cholecalciferol 50 MCG (2000 UT) Oral Cap, once daily, Disp: , Rfl:     No current facility-administered medic strength bilaterally  CN VIII: auditory acuity intact to bedside testing  CN IX/CN X: normal palate elevation  CN XI: normal strength of trapezius and SCM muscles bilaterally. CN XII: tongue protrudes in the midline, no atrophy or fasciculations.     Motor mg/dL   9.6   CALCULATED OSMOLALITY      275 - 295 mOsm/kg   298 (H)   eGFR NON-AFR.  AMERICAN      >=60   82   eGFR       >=60   95   ALT (SGPT)      13 - 56 U/L  17 17   AST (SGOT)      15 - 37 U/L  15 15   ALKALINE PHOSPHATASE      55 - 1 Discussed indication, administration, dose, and side effects with patient of any medications personally prescribed. Patient was advised to let my office know if they have any questions or concerns.      Today, I personally spent 30 minutes in this case,

## 2021-11-03 ENCOUNTER — OFFICE VISIT (OUTPATIENT)
Dept: PHYSICAL THERAPY | Facility: HOSPITAL | Age: 71
End: 2021-11-03
Attending: INTERNAL MEDICINE
Payer: MEDICARE

## 2021-11-03 PROCEDURE — 97110 THERAPEUTIC EXERCISES: CPT

## 2021-11-03 NOTE — PROGRESS NOTES
Dx: At risk for falls (Z91.81)  Primary osteoarthritis of left knee (M17.12)  Chronic bilateral low back pain without sciatica (M54.50,G89.29)             Insurance (Authorized # of Visits):  [facility] 2/12           Authorizing Physician: Dr. Stoney Bang MD vis will demonstrate improved core strength to be able to perform lifting with <3/10 pain   · Pt will be independent and compliant with comprehensive HEP to maintain progress achieved in PT      · Pt will improve knee extension ROM to 0 deg to allow proper jackelin 3, NMR - 0       Total Timed Treatment: 42 min  Total Treatment Time: 42 min

## 2021-11-08 ENCOUNTER — OFFICE VISIT (OUTPATIENT)
Dept: PHYSICAL THERAPY | Facility: HOSPITAL | Age: 71
End: 2021-11-08
Attending: INTERNAL MEDICINE
Payer: MEDICARE

## 2021-11-08 PROCEDURE — 97110 THERAPEUTIC EXERCISES: CPT

## 2021-11-08 NOTE — PROGRESS NOTES
Dx: At risk for falls (Z91.81)  Primary osteoarthritis of left knee (M17.12)  Chronic bilateral low back pain without sciatica (M54.50,G89.29)             Insurance (Authorized # of Visits):  [facility] 4/12           Authorizing Physician: Dr. Sarah Bang MD vis spinal safety   · Pt will improve lumbar spine AROM flexion to full and painless to allow increase ease with bending forward to don shoes   · Pt will have decreased paraspinal mm tension to tolerate standing >45 minutes for work and home activities   · Pt 2x10   LAQ x10 ; popping in medial hamstring belly/tendon   Hamstring stretch 2x30s   LAQ 3# 2x10  TE:   Self STM to calf   Calf stretch at wall 2x30s, on board x30s   Calf raise x30   LAQ 5# 2x10 bilat   PPT to bridge 2x10   Core brace standing, review  S

## 2021-11-09 ENCOUNTER — TELEMEDICINE (OUTPATIENT)
Dept: INTERNAL MEDICINE CLINIC | Facility: CLINIC | Age: 71
End: 2021-11-09
Payer: MEDICARE

## 2021-11-09 ENCOUNTER — TELEPHONE (OUTPATIENT)
Dept: INTERNAL MEDICINE CLINIC | Facility: CLINIC | Age: 71
End: 2021-11-09

## 2021-11-09 ENCOUNTER — NURSE TRIAGE (OUTPATIENT)
Dept: INTERNAL MEDICINE CLINIC | Facility: CLINIC | Age: 71
End: 2021-11-09

## 2021-11-09 DIAGNOSIS — K21.00 GASTROESOPHAGEAL REFLUX DISEASE WITH ESOPHAGITIS, UNSPECIFIED WHETHER HEMORRHAGE: ICD-10-CM

## 2021-11-09 DIAGNOSIS — Z20.822 SUSPECTED COVID-19 VIRUS INFECTION: Primary | ICD-10-CM

## 2021-11-09 DIAGNOSIS — J98.01 BRONCHOSPASM: ICD-10-CM

## 2021-11-09 PROCEDURE — 99213 OFFICE O/P EST LOW 20 MIN: CPT | Performed by: INTERNAL MEDICINE

## 2021-11-09 RX ORDER — OMEPRAZOLE 20 MG/1
CAPSULE, DELAYED RELEASE ORAL
Qty: 42 CAPSULE | Refills: 0 | Status: SHIPPED | OUTPATIENT
Start: 2021-11-09 | End: 2021-12-07

## 2021-11-09 RX ORDER — ALBUTEROL SULFATE 90 UG/1
2 AEROSOL, METERED RESPIRATORY (INHALATION) EVERY 4 HOURS PRN
Qty: 1 EACH | Refills: 3 | Status: SHIPPED | OUTPATIENT
Start: 2021-11-09

## 2021-11-09 NOTE — TELEPHONE ENCOUNTER
Patient had a telemed appointment with Dr. Chowdhury Many 11/9/21. Refill was discussed. See encounter for more details.

## 2021-11-09 NOTE — TELEPHONE ENCOUNTER
Action Requested: Summary for Provider     []  Critical Lab, Recommendations Needed  [] Need Additional Advice  [x]   FYI    []   Need Orders  [] Need Medications Sent to Pharmacy  []  Other     SUMMARY: Patient calling with complaint of mild shortness of

## 2021-11-09 NOTE — TELEPHONE ENCOUNTER
Per pharmacy, pt requesting authorization to dispense a 90 day supply for the following medication:    •  omeprazole 20 MG Oral Capsule Delayed Release, Take 1 capsule (20 mg total) by mouth 2 (two) times daily before meals for 14 days, THEN 1 capsule (20

## 2021-11-09 NOTE — TELEPHONE ENCOUNTER
Patient is calling to request the following:    Covid test (transferring to RN triage)    Diabetes test - it runs in the family  14-day Elliot Bear medication

## 2021-11-09 NOTE — PROGRESS NOTES
Telehealth Visit      I spoke with Joy Valente by secure [employer] video service on 11/09/21, verified date of birth, patient stated [place] in the [place] of [place], and discussed their concerns as below:     Upper Respiratory Infection   This is a new were no vitals filed for this visit. There is no height or weight on file to calculate BMI.   BP Readings from Last 3 Encounters:  11/02/21 : 116/72  10/20/21 : 125/78  09/15/21 : 137/78    The 10-year ASCVD risk score (Farida Huston, et al., 2013) is: 8.2% needed. • aspirin (ASPIRIN 81) 81 MG Oral Tab EC Take 1 tablet (81 mg total) by mouth daily.  365 tablet 3   • FLUTICASONE PROPIONATE 50 MCG/ACT Nasal Suspension SHAKE LIQUID AND USE 2 SPRAYS IN EACH NOSTRIL EVERY DAY AS DIRECTED (Patient taking differe D, calcium, repeat DEXA scan 2023.       Mixed hyperlipidemia       Reviewed Social History:  Social History    Tobacco Use      Smoking status: Never Smoker      Smokeless tobacco: Never Used    Vaping Use      Vaping Use: Never used    Alcohol use: Never time may have been spent reviewing labs, medications, radiology tests and decision making. Appropriate medical decision-making and tests are ordered as detailed in the plan of care above.   Coding/billing information is submitted for this visit based on com

## 2021-11-09 NOTE — TELEPHONE ENCOUNTER
Noted, thanks. Discussed with pt. Covid testing order. Fasting sugars are 80s, unlikely diabetes given other symptoms.

## 2021-11-10 ENCOUNTER — OFFICE VISIT (OUTPATIENT)
Dept: PHYSICAL THERAPY | Facility: HOSPITAL | Age: 71
End: 2021-11-10
Attending: INTERNAL MEDICINE
Payer: MEDICARE

## 2021-11-10 PROCEDURE — 97110 THERAPEUTIC EXERCISES: CPT

## 2021-11-10 RX ORDER — OMEPRAZOLE 20 MG/1
CAPSULE, DELAYED RELEASE ORAL
Qty: 135 CAPSULE | Refills: 0 | Status: CANCELLED | OUTPATIENT
Start: 2021-11-10 | End: 2021-12-08

## 2021-11-10 NOTE — PROGRESS NOTES
Dx: At risk for falls (Z91.81)  Primary osteoarthritis of left knee (M17.12)  Chronic bilateral low back pain without sciatica (M54.50,G89.29)             Insurance (Authorized # of Visits):  [facility] 4/12           Authorizing Physician: Dr. Tanja Bosworth Next MD vis demonstrate improved core strength to be able to perform lifting with <3/10 pain   · Pt will be independent and compliant with comprehensive HEP to maintain progress achieved in PT      · Pt will improve knee extension ROM to 0 deg to allow proper heel str standing, review  Standing hip ext x10 bilat   Standing hip abd x10 bilat  staggered stance holds 2x30s bilat TE:   Quad set 10x3s holds  SLR 2x10 bilat   STS squat 5# 2x10   Hip ext x10, YTB 2x10  Hip abd x10, YTB 2x10   Reviewed standing ER YTB, x10; D2

## 2021-11-10 NOTE — TELEPHONE ENCOUNTER
Reviewed doctor's message below with [facility]. Deb Genao states prescription as sent 11/9/21 is ready for pt to  and had no further questions.

## 2021-11-15 ENCOUNTER — APPOINTMENT (OUTPATIENT)
Dept: PHYSICAL THERAPY | Facility: HOSPITAL | Age: 71
End: 2021-11-15
Attending: INTERNAL MEDICINE
Payer: MEDICARE

## 2021-11-17 ENCOUNTER — OFFICE VISIT (OUTPATIENT)
Dept: PHYSICAL THERAPY | Facility: HOSPITAL | Age: 71
End: 2021-11-17
Attending: INTERNAL MEDICINE
Payer: MEDICARE

## 2021-11-17 PROCEDURE — 97110 THERAPEUTIC EXERCISES: CPT

## 2021-11-17 NOTE — PROGRESS NOTES
Dx: At risk for falls (Z91.81)  Primary osteoarthritis of left knee (M17.12)  Chronic bilateral low back pain without sciatica (M54.50,G89.29)             Insurance (Authorized # of Visits):  [facility] 6/12           Authorizing Physician: Dr. Karen Bang MD vis minutes for work and home activities   · Pt will demonstrate improved core strength to be able to perform lifting with <3/10 pain   · Pt will be independent and compliant with comprehensive HEP to maintain progress achieved in PT      · Pt will improve kne Calf raise x30   LAQ 5# 2x10 bilat   PPT to bridge 2x10   Core brace standing, review  Standing hip ext x10 bilat   Standing hip abd x10 bilat  staggered stance holds 2x30s bilat TE:   Quad set 10x3s holds  SLR 2x10 bilat   STS squat 5# 2x10   Hip ext x1

## 2021-11-22 ENCOUNTER — TELEPHONE (OUTPATIENT)
Dept: PHYSICAL THERAPY | Facility: HOSPITAL | Age: 71
End: 2021-11-22

## 2021-11-22 ENCOUNTER — APPOINTMENT (OUTPATIENT)
Dept: PHYSICAL THERAPY | Facility: HOSPITAL | Age: 71
End: 2021-11-22
Attending: INTERNAL MEDICINE
Payer: MEDICARE

## 2021-12-02 ENCOUNTER — LAB ENCOUNTER (OUTPATIENT)
Dept: LAB | Facility: HOSPITAL | Age: 71
End: 2021-12-02
Attending: INTERNAL MEDICINE
Payer: MEDICARE

## 2021-12-02 DIAGNOSIS — Z20.822 SUSPECTED COVID-19 VIRUS INFECTION: ICD-10-CM

## 2021-12-03 ENCOUNTER — LAB ENCOUNTER (OUTPATIENT)
Dept: LAB | Facility: HOSPITAL | Age: 71
End: 2021-12-03
Attending: SPECIALIST
Payer: MEDICARE

## 2021-12-03 DIAGNOSIS — U07.1 COVID-19: Primary | ICD-10-CM

## 2021-12-20 ENCOUNTER — OFFICE VISIT (OUTPATIENT)
Dept: PHYSICAL THERAPY | Facility: HOSPITAL | Age: 71
End: 2021-12-20
Attending: INTERNAL MEDICINE
Payer: MEDICARE

## 2021-12-20 ENCOUNTER — TELEPHONE (OUTPATIENT)
Dept: PHYSICAL THERAPY | Facility: HOSPITAL | Age: 71
End: 2021-12-20

## 2021-12-20 PROCEDURE — 97112 NEUROMUSCULAR REEDUCATION: CPT

## 2021-12-20 PROCEDURE — 97110 THERAPEUTIC EXERCISES: CPT

## 2021-12-20 NOTE — PROGRESS NOTES
Dx: At risk for falls (Z91.81)  Primary osteoarthritis of left knee (M17.12)  Chronic bilateral low back pain without sciatica (M54.50,G89.29)             Insurance (Authorized # of Visits):  [facility] 6/12           Authorizing Physician: Dr. Emily Toussaitn ref.  provider f and body mechanics to decrease pain and improve spinal safety   · Pt will improve lumbar spine AROM flexion to full and painless to allow increase ease with bending forward to don shoes   · Pt will have decreased paraspinal mm tension to tolerate standing from elevated table, GTB for hip abd 2x10   TE:   sktc 2x30s bilat   LTR x10   PPT x10   PPT to bridge 3x10   QS to SLR 2x10   LAQ x10 ; popping in medial hamstring belly/tendon   Hamstring stretch 2x30s   LAQ 3# 2x10  TE:   Self STM to calf   Calf stretch

## 2021-12-29 ENCOUNTER — TELEPHONE (OUTPATIENT)
Dept: PHYSICAL THERAPY | Facility: HOSPITAL | Age: 71
End: 2021-12-29

## 2021-12-30 ENCOUNTER — APPOINTMENT (OUTPATIENT)
Dept: PHYSICAL THERAPY | Facility: HOSPITAL | Age: 71
End: 2021-12-30
Attending: INTERNAL MEDICINE
Payer: MEDICARE

## 2021-12-30 ENCOUNTER — TELEPHONE (OUTPATIENT)
Dept: PHYSICAL THERAPY | Facility: HOSPITAL | Age: 71
End: 2021-12-30

## 2022-01-14 ENCOUNTER — HOSPITAL ENCOUNTER (EMERGENCY)
Facility: HOSPITAL | Age: 72
Discharge: HOME | End: 2022-01-14
Attending: EMERGENCY MEDICINE
Payer: MEDICARE

## 2022-01-14 VITALS
WEIGHT: 136 LBS | RESPIRATION RATE: 18 BRPM | SYSTOLIC BLOOD PRESSURE: 131 MMHG | TEMPERATURE: 98 F | OXYGEN SATURATION: 98 % | HEIGHT: 62.5 IN | HEART RATE: 62 BPM | BODY MASS INDEX: 24.4 KG/M2 | DIASTOLIC BLOOD PRESSURE: 80 MMHG

## 2022-01-14 DIAGNOSIS — R45.89 DEPRESSED MOOD: ICD-10-CM

## 2022-01-14 DIAGNOSIS — R53.83 OTHER FATIGUE: Primary | ICD-10-CM

## 2022-01-14 DIAGNOSIS — R31.29 OTHER MICROSCOPIC HEMATURIA: ICD-10-CM

## 2022-01-14 LAB
ANION GAP SERPL CALC-SCNC: 3 MMOL/L (ref 0–18)
BASOPHILS # BLD AUTO: 0.03 X10(3) UL (ref 0–0.2)
BASOPHILS NFR BLD AUTO: 0.5 %
BILIRUB UR QL: NEGATIVE
BUN BLD-MCNC: 14 MG/DL (ref 7–18)
BUN/CREAT SERPL: 18.2 (ref 10–20)
CALCIUM BLD-MCNC: 9.1 MG/DL (ref 8.5–10.1)
CHLORIDE SERPL-SCNC: 109 MMOL/L (ref 98–112)
CLARITY UR: CLEAR
CO2 SERPL-SCNC: 28 MMOL/L (ref 21–32)
COLOR UR: YELLOW
CREAT BLD-MCNC: 0.77 MG/DL
DEPRECATED RDW RBC AUTO: 42.4 FL (ref 35.1–46.3)
EOSINOPHIL # BLD AUTO: 0.18 X10(3) UL (ref 0–0.7)
EOSINOPHIL NFR BLD AUTO: 3.2 %
ERYTHROCYTE [DISTWIDTH] IN BLOOD BY AUTOMATED COUNT: 12.9 % (ref 11–15)
GLUCOSE BLD-MCNC: 91 MG/DL (ref 70–99)
GLUCOSE UR-MCNC: NEGATIVE MG/DL
HCT VFR BLD AUTO: 39.4 %
HGB BLD-MCNC: 12.6 G/DL
IMM GRANULOCYTES # BLD AUTO: 0.02 X10(3) UL (ref 0–1)
IMM GRANULOCYTES NFR BLD: 0.4 %
KETONES UR-MCNC: NEGATIVE MG/DL
LEUKOCYTE ESTERASE UR QL STRIP.AUTO: NEGATIVE
LYMPHOCYTES # BLD AUTO: 1.38 X10(3) UL (ref 1–4)
LYMPHOCYTES NFR BLD AUTO: 24.4 %
MAGNESIUM SERPL-MCNC: 2 MG/DL (ref 1.6–2.6)
MCH RBC QN AUTO: 29.2 PG (ref 26–34)
MCHC RBC AUTO-ENTMCNC: 32 G/DL (ref 31–37)
MCV RBC AUTO: 91.2 FL
MONOCYTES # BLD AUTO: 0.43 X10(3) UL (ref 0.1–1)
MONOCYTES NFR BLD AUTO: 7.6 %
NEUTROPHILS # BLD AUTO: 3.62 X10 (3) UL (ref 1.5–7.7)
NEUTROPHILS # BLD AUTO: 3.62 X10(3) UL (ref 1.5–7.7)
NEUTROPHILS NFR BLD AUTO: 63.9 %
NITRITE UR QL STRIP.AUTO: NEGATIVE
OSMOLALITY SERPL CALC.SUM OF ELEC: 290 MOSM/KG (ref 275–295)
PH UR: 7 [PH] (ref 5–8)
PLATELET # BLD AUTO: 341 10(3)UL (ref 150–450)
POTASSIUM SERPL-SCNC: 4.2 MMOL/L (ref 3.5–5.1)
PROT UR-MCNC: NEGATIVE MG/DL
RBC # BLD AUTO: 4.32 X10(6)UL
SODIUM SERPL-SCNC: 140 MMOL/L (ref 136–145)
SP GR UR STRIP: 1.01 (ref 1–1.03)
TSI SER-ACNC: 1.01 MIU/ML (ref 0.36–3.74)
UROBILINOGEN UR STRIP-ACNC: <2
WBC # BLD AUTO: 5.7 X10(3) UL (ref 4–11)

## 2022-01-14 PROCEDURE — 84443 ASSAY THYROID STIM HORMONE: CPT | Performed by: EMERGENCY MEDICINE

## 2022-01-14 PROCEDURE — 85025 COMPLETE CBC W/AUTO DIFF WBC: CPT | Performed by: EMERGENCY MEDICINE

## 2022-01-14 PROCEDURE — 81001 URINALYSIS AUTO W/SCOPE: CPT | Performed by: EMERGENCY MEDICINE

## 2022-01-14 PROCEDURE — 80048 BASIC METABOLIC PNL TOTAL CA: CPT | Performed by: EMERGENCY MEDICINE

## 2022-01-14 PROCEDURE — 83735 ASSAY OF MAGNESIUM: CPT | Performed by: EMERGENCY MEDICINE

## 2022-01-14 PROCEDURE — 99284 EMERGENCY DEPT VISIT MOD MDM: CPT

## 2022-01-14 PROCEDURE — 96360 HYDRATION IV INFUSION INIT: CPT

## 2022-01-14 NOTE — ED INITIAL ASSESSMENT (HPI)
Pt reports not feeling well for awhile, pt reports having fatigue for one year, increase headache over last week and off balance at times

## 2022-01-16 NOTE — ED PROVIDER NOTES
Patient Seen in: [facility] Emergency Department      History   Patient presents with:  Fatigue    Stated Complaint: not feeling good, feeling mental changes, energy gone    Subjective:   HPI    70year old female who presents with months of fatig acute distress. Appearance: Normal appearance. She is well-developed. She is not diaphoretic. HENT:      Head: Normocephalic and atraumatic. Eyes:      General: No visual field deficit.      Conjunctiva/sclera: Conjunctivae normal.      Pupils: Pupi Reviewed   URINALYSIS WITH CULTURE REFLEX - Abnormal; Notable for the following components:       Result Value    Blood Urine Moderate (*)     RBC Urine 3-5 (*)     Bacteria Urine Rare (*)     All other components within normal limits   BASIC METABOLIC PAN microscopic hematuria     Disposition:  Discharge  1/14/2022  2:03 pm    Follow-up:  Pam Carter MD  [address] [place] [address]  [phone]    Call  call with update on your visit and symptoms    We recommend that you schedule follow up

## 2022-01-17 ENCOUNTER — OFFICE VISIT (OUTPATIENT)
Dept: ENDOCRINOLOGY CLINIC | Facility: CLINIC | Age: 72
End: 2022-01-17
Payer: MEDICARE

## 2022-01-17 ENCOUNTER — LAB ENCOUNTER (OUTPATIENT)
Dept: LAB | Facility: HOSPITAL | Age: 72
End: 2022-01-17
Attending: INTERNAL MEDICINE
Payer: MEDICARE

## 2022-01-17 VITALS
SYSTOLIC BLOOD PRESSURE: 144 MMHG | BODY MASS INDEX: 24 KG/M2 | DIASTOLIC BLOOD PRESSURE: 75 MMHG | HEART RATE: 60 BPM | WEIGHT: 131 LBS

## 2022-01-17 DIAGNOSIS — M85.80 OSTEOPENIA, UNSPECIFIED LOCATION: ICD-10-CM

## 2022-01-17 DIAGNOSIS — M85.80 OSTEOPENIA, UNSPECIFIED LOCATION: Primary | ICD-10-CM

## 2022-01-17 PROCEDURE — 82523 COLLAGEN CROSSLINKS: CPT

## 2022-01-17 PROCEDURE — 99203 OFFICE O/P NEW LOW 30 MIN: CPT | Performed by: INTERNAL MEDICINE

## 2022-01-17 PROCEDURE — 84080 ASSAY ALKALINE PHOSPHATASES: CPT

## 2022-01-17 PROCEDURE — 36415 COLL VENOUS BLD VENIPUNCTURE: CPT

## 2022-01-17 NOTE — PROGRESS NOTES
Name: Gissell Cantu  Date: 1/17/2022    Referring Physician: No ref. provider found    Patient presents with:  Consult: Patient would like to see Endocrinologist due to Osteopenia.        HISTORY OF PRESENT ILLNESS   Gissell Cantu is a 70year old female who presen PROPIONATE 50 MCG/ACT Nasal Suspension, SHAKE LIQUID AND USE 2 SPRAYS IN EACH NOSTRIL EVERY DAY AS DIRECTED (Patient taking differently: 2 sprays by Nasal route daily.), Disp: 48 g, Rfl: 0  •  simvastatin 40 MG Oral Tab, Take 1 tablet (40 mg total) by mout Discussed FRAX score is not elevated above treatment levels therefore would not recommend pharmacologic therapy  - Secondary work up performed per rheumatology which was negative  - Continue Vitamin D 2000 units daily  - Start dietary calcium intake with gilma

## 2022-01-19 ENCOUNTER — TELEPHONE (OUTPATIENT)
Dept: PHYSICAL THERAPY | Facility: HOSPITAL | Age: 72
End: 2022-01-19

## 2022-01-19 LAB — BONE SPECIFIC ALKALINE PHOSPHATASE: 12.3 UG/L

## 2022-01-20 ENCOUNTER — TELEPHONE (OUTPATIENT)
Dept: INTERNAL MEDICINE CLINIC | Facility: CLINIC | Age: 72
End: 2022-01-20

## 2022-01-20 DIAGNOSIS — R31.9 HEMATURIA, UNSPECIFIED TYPE: Primary | ICD-10-CM

## 2022-01-20 NOTE — TELEPHONE ENCOUNTER
Patient is requesting a recommendation for a urologist. Patient went to the ER on 1/14 where a urinalysis was ordered. Patient has blood in her urine. Please advise. Patient is requesting a call back with Dr. Endy Boyd recommendation.  Thank you

## 2022-01-21 LAB — C-TELOPEPTIDE, BETA-CROSS-LINK: 585 PG/ML

## 2022-01-21 NOTE — TELEPHONE ENCOUNTER
Spoke with pt name and  verified, relayed message below pt verbalized understanding.  Will fu with pt once we get results

## 2022-01-21 NOTE — TELEPHONE ENCOUNTER
Please have her repeat her urinalysis, if positive will have her follow up with me to discuss further. Results for Kathryn Silva (MRN [id number]) as of 1/21/2022 08:53   Ref.  Range 1/14/2022 12:02   Color Urine Latest Ref Range: Yellow  Yellow   Clarity Urine Latest Ref Range: Clear  Clear   Spec Gravity Latest Ref Range: 1.001 - 1.030  1.010   Glucose Urine Latest Ref Range: Negative mg/dL Negative   Bilirubin Urine Latest Ref Range: Negative  Negative   Ketones, UA Latest Ref Range: Negative mg/dL Negative   Blood Urine Latest Ref Range: Negative  Moderate (A)   PH Urine Latest Ref Range: 5.0 - 8.0  7.0   Protein Urine Latest Ref Range: Negative mg/dL Negative   Urobilinogen Urine Latest Ref Range: <2.0  <2.0   Nitrite Urine Latest Ref Range: Negative  Negative   Leukocyte Esterase Urine Latest Ref Range: Negative  Negative   WBC Urine Latest Ref Range: 0 - 5 /HPF 1-5   RBC Urine Latest Ref Range: 0 - 2 /HPF 3-5 (A)   Bacteria Urine Latest Ref Range: None Seen /HPF Rare (A)

## 2022-01-25 ENCOUNTER — LAB ENCOUNTER (OUTPATIENT)
Dept: LAB | Facility: HOSPITAL | Age: 72
End: 2022-01-25
Attending: INTERNAL MEDICINE
Payer: MEDICARE

## 2022-01-25 LAB
BILIRUB UR QL: NEGATIVE
CLARITY UR: CLEAR
COLOR UR: YELLOW
GLUCOSE UR-MCNC: NEGATIVE MG/DL
KETONES UR-MCNC: NEGATIVE MG/DL
LEUKOCYTE ESTERASE UR QL STRIP.AUTO: NEGATIVE
NITRITE UR QL STRIP.AUTO: NEGATIVE
PH UR: 6 [PH] (ref 5–8)
PROT UR-MCNC: NEGATIVE MG/DL
SP GR UR STRIP: 1.02 (ref 1–1.03)
UROBILINOGEN UR STRIP-ACNC: <2

## 2022-01-25 PROCEDURE — 81001 URINALYSIS AUTO W/SCOPE: CPT | Performed by: INTERNAL MEDICINE

## 2022-01-26 ENCOUNTER — TELEMEDICINE (OUTPATIENT)
Dept: INTERNAL MEDICINE CLINIC | Facility: CLINIC | Age: 72
End: 2022-01-26
Payer: MEDICARE

## 2022-01-26 ENCOUNTER — TELEPHONE (OUTPATIENT)
Dept: INTERNAL MEDICINE CLINIC | Facility: CLINIC | Age: 72
End: 2022-01-26

## 2022-01-26 DIAGNOSIS — R31.9 HEMATURIA, UNSPECIFIED TYPE: Primary | ICD-10-CM

## 2022-01-26 PROCEDURE — 99213 OFFICE O/P EST LOW 20 MIN: CPT | Performed by: INTERNAL MEDICINE

## 2022-01-26 NOTE — TELEPHONE ENCOUNTER
Patient states she just missed a call from us.      1/26/2022 10:17 AM CST Back to Top        Please schedule video visit with me to discuss her urinalysis, she does have blood in her urine, but unclear cause.  Would like to discuss referrals versus imagin

## 2022-01-26 NOTE — PROGRESS NOTES
Telehealth Visit      I spoke with Vibha Best by secure [employer] video service on 01/26/22, verified date of birth, patient stated they are in the state of [place], and discussed their concerns as below:     HPI   Very pleasant 60-year-old female pres Constitutional:       General: She is not in acute distress. Appearance: Normal appearance. HENT:      Head: Normocephalic and atraumatic.       Right Ear: External ear normal.      Left Ear: External ear normal.      Nose: Nose normal.   Eyes: take aspirin, she is not a smoker. I do not have any other CT imaging at this time therefore we will proceed with CT abdomen pelvis to rule any other occult cause and if this is unremarkable we will have her follow-up with urology to discuss further. the visit. The patient was made aware of the limitations of the telehealth visit, including treatment limitations as no physical exam could be performed. The patient was advised to call 911 or to go to the ER in case there was an emergency.   The patient wa

## 2022-02-11 ENCOUNTER — HOSPITAL ENCOUNTER (OUTPATIENT)
Dept: CT IMAGING | Facility: HOSPITAL | Age: 72
Discharge: HOME | End: 2022-02-11
Attending: INTERNAL MEDICINE
Payer: MEDICARE

## 2022-02-11 DIAGNOSIS — R31.9 HEMATURIA, UNSPECIFIED TYPE: ICD-10-CM

## 2022-02-11 PROCEDURE — 74176 CT ABD & PELVIS W/O CONTRAST: CPT | Performed by: INTERNAL MEDICINE

## 2022-02-14 ENCOUNTER — TELEPHONE (OUTPATIENT)
Dept: SURGERY | Facility: CLINIC | Age: 72
End: 2022-02-14

## 2022-02-14 NOTE — TELEPHONE ENCOUNTER
Phoned patient at 2:31pm 2/14/22, left voicemail requesting return call to schedule office appointment. Advised patient we have 5:45 pm opening today, if interested please call asap.

## 2022-02-14 NOTE — TELEPHONE ENCOUNTER
Phoned patient, left voicemail requesting return call. Patient's primary care doctor call to have patient seen for right renal mass. We are holding appointment with Dr. Kristen Cottrell today 2/14/22 at 5:45 pm for patient. If this does not work for patient please transfer her onsite to [place] [id number] for scheduling with clinical team.    Thank you.

## 2022-03-22 ENCOUNTER — MED REC SCAN ONLY (OUTPATIENT)
Dept: INTERNAL MEDICINE CLINIC | Facility: CLINIC | Age: 72
End: 2022-03-22

## 2022-03-22 NOTE — TELEPHONE ENCOUNTER
Seems a bit inappropriate to prescribe a medication for the patient then inform her they are not accepting new patients? In either case, I can refill but please have the doctor send his notes, indications for fluovxamine, plan, etc. I do not have any records.

## 2022-03-22 NOTE — TELEPHONE ENCOUNTER
Patient calling, identified name and , patient states was given Fluvoxamine 50mg daily  At bedtine from Dr. Pathak [place] at [facility] ) and they are not taking any new patients at this time    Patient  asking  if Dr. Lorri Loyd can prescribe this for her now    Patient states Armen Schofield has this on file for her      Allergies reviewed and pharmacy confirmed    Med pended for your review / approval

## 2022-03-22 NOTE — TELEPHONE ENCOUNTER
Spoke with patient, (  verified ) informed of 's   instructions below    Provided our fax number and patient will call DR. Coreas's office and have them send the info  forward     Patient verbalizes understanding and agrees.      ADO please be aware of incoming  fax

## 2022-03-23 ENCOUNTER — TELEPHONE (OUTPATIENT)
Dept: INTERNAL MEDICINE CLINIC | Facility: CLINIC | Age: 72
End: 2022-03-23

## 2022-03-23 NOTE — TELEPHONE ENCOUNTER
Pt has upcoming appointment with Dr Sanjay Radford in urology. Pt asking if CT scan from 2/11/22 can be faxed to Dr Sanjay Radofrd for review at time of OV.   Called Dr Philippe Credit office and obtained fax numb [phone]    Imaging faxed per pt request

## 2022-04-04 ENCOUNTER — OFFICE VISIT (OUTPATIENT)
Dept: INTERNAL MEDICINE CLINIC | Facility: CLINIC | Age: 72
End: 2022-04-04
Payer: MEDICARE

## 2022-04-04 VITALS
SYSTOLIC BLOOD PRESSURE: 149 MMHG | DIASTOLIC BLOOD PRESSURE: 76 MMHG | HEART RATE: 69 BPM | HEIGHT: 62.5 IN | WEIGHT: 132 LBS | BODY MASS INDEX: 23.68 KG/M2

## 2022-04-04 DIAGNOSIS — R04.0 EPISTAXIS: ICD-10-CM

## 2022-04-04 DIAGNOSIS — R00.2 PALPITATIONS WITH REGULAR CARDIAC RHYTHM: ICD-10-CM

## 2022-04-04 DIAGNOSIS — M62.838 MUSCLE SPASMS OF NECK: Primary | ICD-10-CM

## 2022-04-04 PROBLEM — I65.21 CAROTID ARTERY STENOSIS, UNILATERAL, RIGHT: Status: RESOLVED | Noted: 2021-07-02 | Resolved: 2022-04-04

## 2022-04-04 PROCEDURE — 99213 OFFICE O/P EST LOW 20 MIN: CPT | Performed by: INTERNAL MEDICINE

## 2022-04-04 NOTE — PATIENT INSTRUCTIONS
1.  For your nosebleeds lets stop using the fluticasone i.e. Flonase nasal spray and I would like for you to switch to Ocean mist nasal saline spray which is over-the-counter. In addition you can take a very small amount of Vaseline on a clean Q-tip and apply it to the inside of your nose to help provide moisture and lubrication and prevent bleeding. If you continue to have nosebleeds I will give you a referral to see the ear nose and throat physician in the [address] [place] [address] office who can look inside of the nose as sometimes there are blood vessels that pop through and bleed i.e. vascular ectasia. You can also purchase a Vicks personal humidifier, they are available over-the-counter or on [address] [facility] [address]    2. For your neck discomfort I recommend using a percussion massager along with getting a son being heated neck heating pad.   Your carotid arteries are normal

## 2022-04-12 ENCOUNTER — HOSPITAL ENCOUNTER (OUTPATIENT)
Dept: CV DIAGNOSTICS | Facility: HOSPITAL | Age: 72
Discharge: HOME | End: 2022-04-12
Attending: INTERNAL MEDICINE
Payer: MEDICARE

## 2022-04-12 DIAGNOSIS — R00.2 PALPITATIONS WITH REGULAR CARDIAC RHYTHM: ICD-10-CM

## 2022-04-12 PROCEDURE — 93243 EXT ECG>48HR<7D SCAN A/R: CPT | Performed by: INTERNAL MEDICINE

## 2022-04-12 PROCEDURE — 93242 EXT ECG>48HR<7D RECORDING: CPT | Performed by: INTERNAL MEDICINE

## 2022-04-12 PROCEDURE — 93244 EXT ECG>48HR<7D REV&INTERPJ: CPT | Performed by: INTERNAL MEDICINE

## 2022-04-20 ENCOUNTER — TELEPHONE (OUTPATIENT)
Dept: INTERNAL MEDICINE CLINIC | Facility: CLINIC | Age: 72
End: 2022-04-20

## 2022-05-02 ENCOUNTER — HOSPITAL ENCOUNTER (OUTPATIENT)
Dept: MAMMOGRAPHY | Facility: HOSPITAL | Age: 72
Discharge: HOME | End: 2022-05-02
Attending: INTERNAL MEDICINE
Payer: MEDICARE

## 2022-05-02 DIAGNOSIS — Z12.31 SCREENING MAMMOGRAM, ENCOUNTER FOR: ICD-10-CM

## 2022-05-02 PROCEDURE — 77067 SCR MAMMO BI INCL CAD: CPT | Performed by: INTERNAL MEDICINE

## 2022-05-02 PROCEDURE — 77063 BREAST TOMOSYNTHESIS BI: CPT | Performed by: INTERNAL MEDICINE

## 2022-05-19 ENCOUNTER — TELEPHONE (OUTPATIENT)
Dept: ORTHOPEDICS CLINIC | Facility: CLINIC | Age: 72
End: 2022-05-19

## 2022-05-19 NOTE — TELEPHONE ENCOUNTER
Patient c/o left leg pain x 10 days. States recently started having trouble walking due to pain. States at times even starts limping. Denies any pain at this time, states only felt when ambulating and rates pain a 7/10. Patient has not been using lateral unloaders brace as recommended on 10/20/21 and also did not complete PT that was recommended. Denies any recent falls or injuries, denies any redness on calf, swelling, numbness or tingling. Patient was scheduled on 6/22/22 as next available slot, but patient is requesting message to be sent to MD to see if she would be able to be seen sooner. Please advise. Thank you.

## 2022-05-19 NOTE — TELEPHONE ENCOUNTER
Per pt is having extreme leg pain, asking for appt soon, requesting to speak to RN. Please call thank you.

## 2022-05-26 ENCOUNTER — HOSPITAL ENCOUNTER (OUTPATIENT)
Dept: GENERAL RADIOLOGY | Facility: HOSPITAL | Age: 72
Discharge: HOME | End: 2022-05-26
Attending: ORTHOPAEDIC SURGERY
Payer: MEDICARE

## 2022-05-26 ENCOUNTER — OFFICE VISIT (OUTPATIENT)
Dept: ORTHOPEDICS CLINIC | Facility: CLINIC | Age: 72
End: 2022-05-26
Payer: MEDICARE

## 2022-05-26 DIAGNOSIS — G89.29 CHRONIC PAIN OF LEFT KNEE: Primary | ICD-10-CM

## 2022-05-26 DIAGNOSIS — G89.29 CHRONIC PAIN OF LEFT KNEE: ICD-10-CM

## 2022-05-26 DIAGNOSIS — M17.12 PRIMARY OSTEOARTHRITIS OF LEFT KNEE: ICD-10-CM

## 2022-05-26 DIAGNOSIS — M25.562 CHRONIC PAIN OF LEFT KNEE: Primary | ICD-10-CM

## 2022-05-26 DIAGNOSIS — M25.562 CHRONIC PAIN OF LEFT KNEE: ICD-10-CM

## 2022-05-26 PROCEDURE — 99213 OFFICE O/P EST LOW 20 MIN: CPT | Performed by: ORTHOPAEDIC SURGERY

## 2022-05-26 PROCEDURE — 73564 X-RAY EXAM KNEE 4 OR MORE: CPT | Performed by: ORTHOPAEDIC SURGERY

## 2022-05-27 ENCOUNTER — TELEPHONE (OUTPATIENT)
Dept: ORTHOPEDICS CLINIC | Facility: CLINIC | Age: 72
End: 2022-05-27

## 2022-05-31 ENCOUNTER — MED REC SCAN ONLY (OUTPATIENT)
Dept: INTERNAL MEDICINE CLINIC | Facility: CLINIC | Age: 72
End: 2022-05-31

## 2022-06-02 NOTE — TELEPHONE ENCOUNTER
Verified coverage. Patient has Medicare and Medicaid. Jenae MRI and surgery will not require approval.     Please let the patient know that she can schedule.

## 2022-06-09 ENCOUNTER — TELEPHONE (OUTPATIENT)
Dept: ORTHOPEDICS CLINIC | Facility: CLINIC | Age: 72
End: 2022-06-09

## 2022-06-09 NOTE — TELEPHONE ENCOUNTER
SARI. We have surgery dates available, based off of her MRI date:    8/15; 8/22    These dates are only good for 48hrs.

## 2022-06-13 NOTE — TELEPHONE ENCOUNTER
Pt calling states family emergencies she needs to attend to and needs to hold off on surgery and to cancel MRI as well please advise

## 2022-06-19 ENCOUNTER — OFFICE VISIT (OUTPATIENT)
Dept: FAMILY MEDICINE CLINIC | Facility: CLINIC | Age: 72
End: 2022-06-19
Payer: MEDICARE

## 2022-06-19 VITALS
OXYGEN SATURATION: 99 % | DIASTOLIC BLOOD PRESSURE: 75 MMHG | HEART RATE: 68 BPM | RESPIRATION RATE: 20 BRPM | SYSTOLIC BLOOD PRESSURE: 153 MMHG | TEMPERATURE: 98 F

## 2022-06-19 DIAGNOSIS — J98.8 CONGESTION OF RESPIRATORY TRACT: Primary | ICD-10-CM

## 2022-06-19 DIAGNOSIS — R53.83 FATIGUE, UNSPECIFIED TYPE: ICD-10-CM

## 2022-06-19 LAB
OPERATOR ID: NORMAL
RAPID SARS-COV-2 BY PCR: NOT DETECTED

## 2022-06-19 PROCEDURE — 99213 OFFICE O/P EST LOW 20 MIN: CPT

## 2022-06-19 PROCEDURE — U0002 COVID-19 LAB TEST NON-CDC: HCPCS

## 2022-08-05 ENCOUNTER — LAB ENCOUNTER (OUTPATIENT)
Dept: LAB | Age: 72
End: 2022-08-05
Attending: INTERNAL MEDICINE
Payer: MEDICARE

## 2022-08-05 ENCOUNTER — NURSE TRIAGE (OUTPATIENT)
Dept: INTERNAL MEDICINE CLINIC | Facility: CLINIC | Age: 72
End: 2022-08-05

## 2022-08-05 DIAGNOSIS — Z20.822 COVID-19 RULED OUT: Primary | ICD-10-CM

## 2022-08-05 DIAGNOSIS — Z20.822 COVID-19 RULED OUT: ICD-10-CM

## 2022-08-06 LAB — SARS-COV-2 RNA RESP QL NAA+PROBE: NOT DETECTED

## 2022-10-13 ENCOUNTER — PATIENT MESSAGE (OUTPATIENT)
Dept: PODIATRY CLINIC | Facility: CLINIC | Age: 72
End: 2022-10-13

## 2022-12-07 NOTE — TELEPHONE ENCOUNTER
Tried to call patient back, left a message for her to call me back. [NOTE INCOMPLETE - PLEASE WAIT FOR FINAL RECOMMENDATIONS] Warner Dc is a 77-year-old man with a PMHx of HTN, has a psychiatric history notable for Polysubstance use--- no recent use- on Methadone for opioid use disorder, now presenting with confusion and found to have hypertensive urgency at the Methadone clinic on 12/6/2022. Psychiatry consulted for assessment of mood.  Based on assessment done today and collateral obtained from wife, patient has a chronic history of polysubstance abuse, on Methadone for years with no evident drug/alcohol relapse. There has been declining cognition which has been not been fully evaluated by outpatient neurology. Some degree of depressive symptoms as well from patient report which does not meet criteria for MDD at this time. No SI or HI, and other than patient's declining functioning at home, wife has no psychiatric safety concerns. Suspecting superimposed acute confusion at this time-- likely delirium.     Recommendations  - Deferring observation status to medicine team. No psychiatric indications for 1:1. Monitor for AWOL risk.   -  Warner Dc is a 77-year-old man with a PMHx of HTN, has a psychiatric history notable for Polysubstance use--- no recent use- on Methadone for opioid use disorder, now presenting with confusion and found to have hypertensive urgency at the Methadone clinic on 12/6/2022. Psychiatry consulted for assessment of mood.  Based on assessment done today and collateral obtained from wife, patient has a chronic history of polysubstance abuse, on Methadone for years with no evident drug/alcohol relapse. There has been declining cognition which has been not been fully evaluated by outpatient neurology. Some degree of depressive symptoms as well from patient report which does not meet criteria for MDD at this time. No SI or HI, and other than patient's declining functioning at home, wife has no psychiatric safety concerns. Suspecting superimposed acute confusion at this time-- likely delirium.     Recommendations  - Deferring observation status to medicine team. No psychiatric indications for 1:1. Monitor for AWOL risk.   - Continue current dose of Methadone 130mg q AM PO--- has been on this dose for years. Outpatient provider to attempt on lowering dose zackery as patient ages.   - START Melatonin 3mg q 8pm PO--- to target sleep.  - CHECK TSH, Vitamin b12 level, folate level, RPR=====delirium, dementia work up.  - Outpatient neurology f/u.   - AGITATION---if qtc<500, Seroquel 25mg TID PRN PO.   DISPOSITION=== Wife keen on speaking to medicine team and SW about discharge options. Patient to f/u at the [facility] Methadone clinic upon discharge- Chin Pascal NP.

## 2022-12-19 ENCOUNTER — LAB ENCOUNTER (OUTPATIENT)
Dept: LAB | Facility: HOSPITAL | Age: 72
End: 2022-12-19
Attending: INTERNAL MEDICINE
Payer: MEDICARE

## 2022-12-19 DIAGNOSIS — E78.2 MIXED HYPERLIPIDEMIA: Primary | ICD-10-CM

## 2022-12-19 DIAGNOSIS — I47.1 PAROXYSMAL SUPRAVENTRICULAR TACHYCARDIA (HCC): ICD-10-CM

## 2022-12-19 LAB
ALBUMIN SERPL-MCNC: 3.8 G/DL (ref 3.4–5)
ALBUMIN/GLOB SERPL: 1.4 {RATIO} (ref 1–2)
ALP LIVER SERPL-CCNC: 65 U/L
ALT SERPL-CCNC: 18 U/L
ANION GAP SERPL CALC-SCNC: 3 MMOL/L (ref 0–18)
AST SERPL-CCNC: 12 U/L (ref 15–37)
BASOPHILS # BLD AUTO: 0.03 X10(3) UL (ref 0–0.2)
BASOPHILS NFR BLD AUTO: 0.8 %
BILIRUB SERPL-MCNC: 0.5 MG/DL (ref 0.1–2)
BUN BLD-MCNC: 12 MG/DL (ref 7–18)
BUN/CREAT SERPL: 16.2 (ref 10–20)
CALCIUM BLD-MCNC: 9.3 MG/DL (ref 8.5–10.1)
CHLORIDE SERPL-SCNC: 109 MMOL/L (ref 98–112)
CHOLEST SERPL-MCNC: 147 MG/DL (ref ?–200)
CO2 SERPL-SCNC: 32 MMOL/L (ref 21–32)
CREAT BLD-MCNC: 0.74 MG/DL
DEPRECATED RDW RBC AUTO: 45.4 FL (ref 35.1–46.3)
EOSINOPHIL # BLD AUTO: 0.16 X10(3) UL (ref 0–0.7)
EOSINOPHIL NFR BLD AUTO: 4.1 %
ERYTHROCYTE [DISTWIDTH] IN BLOOD BY AUTOMATED COUNT: 12.9 % (ref 11–15)
FASTING PATIENT LIPID ANSWER: YES
FASTING STATUS PATIENT QL REPORTED: YES
GFR SERPLBLD BASED ON 1.73 SQ M-ARVRAT: 86 ML/MIN/1.73M2 (ref 60–?)
GLOBULIN PLAS-MCNC: 2.8 G/DL (ref 2.8–4.4)
GLUCOSE BLD-MCNC: 77 MG/DL (ref 70–99)
HCT VFR BLD AUTO: 41.4 %
HDLC SERPL-MCNC: 66 MG/DL (ref 40–59)
HGB BLD-MCNC: 13.3 G/DL
IMM GRANULOCYTES # BLD AUTO: 0.01 X10(3) UL (ref 0–1)
IMM GRANULOCYTES NFR BLD: 0.3 %
LDLC SERPL CALC-MCNC: 66 MG/DL (ref ?–100)
LYMPHOCYTES # BLD AUTO: 1.31 X10(3) UL (ref 1–4)
LYMPHOCYTES NFR BLD AUTO: 33.4 %
MCH RBC QN AUTO: 30.6 PG (ref 26–34)
MCHC RBC AUTO-ENTMCNC: 32.1 G/DL (ref 31–37)
MCV RBC AUTO: 95.2 FL
MONOCYTES # BLD AUTO: 0.36 X10(3) UL (ref 0.1–1)
MONOCYTES NFR BLD AUTO: 9.2 %
NEUTROPHILS # BLD AUTO: 2.05 X10 (3) UL (ref 1.5–7.7)
NEUTROPHILS # BLD AUTO: 2.05 X10(3) UL (ref 1.5–7.7)
NEUTROPHILS NFR BLD AUTO: 52.2 %
NONHDLC SERPL-MCNC: 81 MG/DL (ref ?–130)
OSMOLALITY SERPL CALC.SUM OF ELEC: 297 MOSM/KG (ref 275–295)
PLATELET # BLD AUTO: 314 10(3)UL (ref 150–450)
POTASSIUM SERPL-SCNC: 4.1 MMOL/L (ref 3.5–5.1)
PROT SERPL-MCNC: 6.6 G/DL (ref 6.4–8.2)
RBC # BLD AUTO: 4.35 X10(6)UL
SODIUM SERPL-SCNC: 144 MMOL/L (ref 136–145)
TRIGL SERPL-MCNC: 78 MG/DL (ref 30–149)
VLDLC SERPL CALC-MCNC: 12 MG/DL (ref 0–30)
WBC # BLD AUTO: 3.9 X10(3) UL (ref 4–11)

## 2022-12-19 PROCEDURE — 36415 COLL VENOUS BLD VENIPUNCTURE: CPT

## 2022-12-19 PROCEDURE — 85025 COMPLETE CBC W/AUTO DIFF WBC: CPT

## 2022-12-19 PROCEDURE — 80061 LIPID PANEL: CPT

## 2022-12-19 PROCEDURE — 80053 COMPREHEN METABOLIC PANEL: CPT

## 2023-03-10 ENCOUNTER — HOSPITAL ENCOUNTER (EMERGENCY)
Facility: HOSPITAL | Age: 73
Discharge: HOME | End: 2023-03-10
Attending: EMERGENCY MEDICINE
Payer: MEDICARE

## 2023-03-10 VITALS
TEMPERATURE: 98 F | RESPIRATION RATE: 18 BRPM | WEIGHT: 130 LBS | HEIGHT: 62 IN | DIASTOLIC BLOOD PRESSURE: 82 MMHG | HEART RATE: 64 BPM | SYSTOLIC BLOOD PRESSURE: 146 MMHG | OXYGEN SATURATION: 99 % | BODY MASS INDEX: 23.92 KG/M2

## 2023-03-10 DIAGNOSIS — R21 RASH: Primary | ICD-10-CM

## 2023-03-10 PROCEDURE — 99283 EMERGENCY DEPT VISIT LOW MDM: CPT

## 2023-03-10 PROCEDURE — 99282 EMERGENCY DEPT VISIT SF MDM: CPT

## 2023-03-10 RX ORDER — CETIRIZINE HYDROCHLORIDE 10 MG/1
10 TABLET ORAL DAILY
Qty: 30 TABLET | Refills: 0 | Status: SHIPPED | OUTPATIENT
Start: 2023-03-10 | End: 2023-04-09

## 2023-03-10 NOTE — ED INITIAL ASSESSMENT (HPI)
Patient here for red rash that comes and goes for the last four weeks. Rash on the hands arms and thighs no itching but feels like it's burning. Also states having \"lighting\" in her vision followed by dizziness. States she recently moved and apt [place] the boiler [place] and unsure if that is related.

## 2023-03-28 ENCOUNTER — MED REC SCAN ONLY (OUTPATIENT)
Dept: INTERNAL MEDICINE CLINIC | Facility: CLINIC | Age: 73
End: 2023-03-28

## 2023-05-02 ENCOUNTER — TELEPHONE (OUTPATIENT)
Dept: INTERNAL MEDICINE CLINIC | Facility: CLINIC | Age: 73
End: 2023-05-02

## 2023-05-02 DIAGNOSIS — Z12.31 SCREENING MAMMOGRAM, ENCOUNTER FOR: Primary | ICD-10-CM

## 2023-05-20 ENCOUNTER — MED REC SCAN ONLY (OUTPATIENT)
Dept: INTERNAL MEDICINE CLINIC | Facility: CLINIC | Age: 73
End: 2023-05-20

## 2023-05-23 ENCOUNTER — LAB ENCOUNTER (OUTPATIENT)
Dept: LAB | Age: 73
End: 2023-05-23
Attending: SPECIALIST
Payer: MEDICARE

## 2023-05-23 ENCOUNTER — OFFICE VISIT (OUTPATIENT)
Dept: OTOLARYNGOLOGY | Facility: CLINIC | Age: 73
End: 2023-05-23

## 2023-05-23 VITALS — BODY MASS INDEX: 23.92 KG/M2 | HEIGHT: 62 IN | WEIGHT: 130 LBS

## 2023-05-23 DIAGNOSIS — H93.12 TINNITUS, LEFT EAR: ICD-10-CM

## 2023-05-23 DIAGNOSIS — E06.9 THYROIDITIS: Primary | ICD-10-CM

## 2023-05-23 DIAGNOSIS — E06.9 THYROIDITIS: ICD-10-CM

## 2023-05-23 LAB — TSI SER-ACNC: 0.87 MIU/ML (ref 0.36–3.74)

## 2023-05-23 PROCEDURE — 36415 COLL VENOUS BLD VENIPUNCTURE: CPT

## 2023-05-23 PROCEDURE — 84443 ASSAY THYROID STIM HORMONE: CPT

## 2023-05-23 PROCEDURE — 99203 OFFICE O/P NEW LOW 30 MIN: CPT | Performed by: SPECIALIST

## 2023-05-23 PROCEDURE — 31575 DIAGNOSTIC LARYNGOSCOPY: CPT | Performed by: SPECIALIST

## 2023-05-23 NOTE — PATIENT INSTRUCTIONS
An audiogram was ordered to better understand her underlying hearing loss. I ordered a TSH and thyroid ultrasound for your granular and tender thyroid gland. I will of course notify you with all results.

## 2023-05-24 ENCOUNTER — HOSPITAL ENCOUNTER (OUTPATIENT)
Dept: ULTRASOUND IMAGING | Age: 73
Discharge: HOME | End: 2023-05-24
Attending: SPECIALIST
Payer: MEDICARE

## 2023-05-24 DIAGNOSIS — E06.9 THYROIDITIS: ICD-10-CM

## 2023-05-24 PROCEDURE — 76536 US EXAM OF HEAD AND NECK: CPT | Performed by: SPECIALIST

## 2023-06-06 ENCOUNTER — HOSPITAL ENCOUNTER (OUTPATIENT)
Dept: MAMMOGRAPHY | Age: 73
Discharge: HOME | End: 2023-06-06
Attending: INTERNAL MEDICINE
Payer: MEDICARE

## 2023-06-06 ENCOUNTER — LAB ENCOUNTER (OUTPATIENT)
Dept: LAB | Facility: HOSPITAL | Age: 73
End: 2023-06-06
Attending: INTERNAL MEDICINE
Payer: MEDICARE

## 2023-06-06 DIAGNOSIS — E78.2 MIXED HYPERLIPIDEMIA: Primary | ICD-10-CM

## 2023-06-06 DIAGNOSIS — Z12.31 SCREENING MAMMOGRAM, ENCOUNTER FOR: ICD-10-CM

## 2023-06-06 DIAGNOSIS — I47.1 PAROXYSMAL SUPRAVENTRICULAR TACHYCARDIA (HCC): ICD-10-CM

## 2023-06-06 LAB
ALBUMIN SERPL-MCNC: 3.9 G/DL (ref 3.4–5)
ALBUMIN/GLOB SERPL: 1.4 {RATIO} (ref 1–2)
ALP LIVER SERPL-CCNC: 61 U/L
ALT SERPL-CCNC: 16 U/L
ANION GAP SERPL CALC-SCNC: 5 MMOL/L (ref 0–18)
AST SERPL-CCNC: 15 U/L (ref 15–37)
BILIRUB SERPL-MCNC: 0.7 MG/DL (ref 0.1–2)
BUN BLD-MCNC: 11 MG/DL (ref 7–18)
BUN/CREAT SERPL: 15.9 (ref 10–20)
CALCIUM BLD-MCNC: 9.6 MG/DL (ref 8.5–10.1)
CHLORIDE SERPL-SCNC: 111 MMOL/L (ref 98–112)
CHOLEST SERPL-MCNC: 139 MG/DL (ref ?–200)
CO2 SERPL-SCNC: 28 MMOL/L (ref 21–32)
CREAT BLD-MCNC: 0.69 MG/DL
FASTING PATIENT LIPID ANSWER: YES
FASTING STATUS PATIENT QL REPORTED: YES
GFR SERPLBLD BASED ON 1.73 SQ M-ARVRAT: 92 ML/MIN/1.73M2 (ref 60–?)
GLOBULIN PLAS-MCNC: 2.8 G/DL (ref 2.8–4.4)
GLUCOSE BLD-MCNC: 92 MG/DL (ref 70–99)
HDLC SERPL-MCNC: 68 MG/DL (ref 40–59)
LDLC SERPL CALC-MCNC: 58 MG/DL (ref ?–100)
NONHDLC SERPL-MCNC: 71 MG/DL (ref ?–130)
OSMOLALITY SERPL CALC.SUM OF ELEC: 297 MOSM/KG (ref 275–295)
POTASSIUM SERPL-SCNC: 4.4 MMOL/L (ref 3.5–5.1)
PROT SERPL-MCNC: 6.7 G/DL (ref 6.4–8.2)
SODIUM SERPL-SCNC: 144 MMOL/L (ref 136–145)
TRIGL SERPL-MCNC: 63 MG/DL (ref 30–149)
VLDLC SERPL CALC-MCNC: 9 MG/DL (ref 0–30)

## 2023-06-06 PROCEDURE — 80061 LIPID PANEL: CPT

## 2023-06-06 PROCEDURE — 80053 COMPREHEN METABOLIC PANEL: CPT

## 2023-06-06 PROCEDURE — 36415 COLL VENOUS BLD VENIPUNCTURE: CPT

## 2023-06-06 PROCEDURE — 77067 SCR MAMMO BI INCL CAD: CPT | Performed by: INTERNAL MEDICINE

## 2023-06-06 PROCEDURE — 77063 BREAST TOMOSYNTHESIS BI: CPT | Performed by: INTERNAL MEDICINE

## 2023-07-10 ENCOUNTER — TELEPHONE (OUTPATIENT)
Dept: INTERNAL MEDICINE CLINIC | Facility: CLINIC | Age: 73
End: 2023-07-10

## 2023-07-10 NOTE — TELEPHONE ENCOUNTER
Patient called (identified name and ),   Wanted to go over mammogram results. Discussed Dr Shantal Ahn message in detail. Patient does have a GYN and was advised to get clinical breast exam yearly, and mammogram.  She agreed to plan. [facility] DAPHNEY 2D+3D SCREENING BILAT (CPT=[id number]): Patient Communication     Edit Comments   Add Notifications     Your mammogram results show no mammographic evidence of malignancy in either breast. As long as your clinical breast exams remain unchanged, annual screening mammogram is recommended. Please continue to follow up with your gynecologist physician for yearly clinical breast exams as mammograms are not 100% accurate in detecting breast cancers. Please continue self breast exams and alert me right away with any abnormal findings. If you do not have an ob/gyn physician please let me know and I will provide a referral for you if needed.    Written by Christina Brothers MD on 2023  9:59 AM CDT

## 2023-07-31 ENCOUNTER — PATIENT MESSAGE (OUTPATIENT)
Dept: ADMINISTRATIVE | Age: 73
End: 2023-07-31

## 2023-08-25 ENCOUNTER — TELEPHONE (OUTPATIENT)
Dept: INTERNAL MEDICINE CLINIC | Facility: CLINIC | Age: 73
End: 2023-08-25

## 2023-10-06 ENCOUNTER — OFFICE VISIT (OUTPATIENT)
Dept: AUDIOLOGY | Facility: CLINIC | Age: 73
End: 2023-10-06

## 2023-10-06 DIAGNOSIS — H93.12 TINNITUS, LEFT: Primary | ICD-10-CM

## 2023-10-06 PROCEDURE — 92567 TYMPANOMETRY: CPT | Performed by: AUDIOLOGIST

## 2023-10-06 PROCEDURE — 92557 COMPREHENSIVE HEARING TEST: CPT | Performed by: AUDIOLOGIST

## 2023-10-06 NOTE — TELEPHONE ENCOUNTER
Asymmetric hearing loss in the left ear 3 - 8 kHz. Patient thinks she may have old testing for comparison. Word discrimination score is perfect at 100%. This makes retrocochlear disease unlikely.

## 2023-10-24 ENCOUNTER — PATIENT OUTREACH (OUTPATIENT)
Dept: CASE MANAGEMENT | Age: 73
End: 2023-10-24

## 2023-10-24 NOTE — PROCEDURES
The office order for PCP removal request is Approved and finalized on October 24, 2023.     Thanks,  [facility] [employer]

## 2024-05-09 ENCOUNTER — OFFICE VISIT (OUTPATIENT)
Dept: PHYSICAL MEDICINE AND REHAB | Facility: CLINIC | Age: 74
End: 2024-05-09
Payer: MEDICARE

## 2024-05-09 VITALS — WEIGHT: 131 LBS | BODY MASS INDEX: 24.11 KG/M2 | HEIGHT: 62 IN

## 2024-05-09 DIAGNOSIS — Q74.1 CONGENITAL GENU VALGUS: ICD-10-CM

## 2024-05-09 DIAGNOSIS — S76.019A STRAIN OF GLUTEUS MEDIUS, UNSPECIFIED LATERALITY, INITIAL ENCOUNTER: ICD-10-CM

## 2024-05-09 DIAGNOSIS — M79.10 MYALGIA: Primary | ICD-10-CM

## 2024-05-09 DIAGNOSIS — M54.59 MECHANICAL LOW BACK PAIN: ICD-10-CM

## 2024-05-09 DIAGNOSIS — M48.061 LUMBAR FORAMINAL STENOSIS: ICD-10-CM

## 2024-05-09 DIAGNOSIS — M17.10 PRIMARY OSTEOARTHRITIS OF KNEE, UNSPECIFIED LATERALITY: ICD-10-CM

## 2024-05-09 DIAGNOSIS — M47.816 LUMBAR SPONDYLOSIS: ICD-10-CM

## 2024-05-09 DIAGNOSIS — M25.551 RIGHT HIP PAIN: ICD-10-CM

## 2024-05-09 DIAGNOSIS — M41.35 THORACOGENIC SCOLIOSIS OF THORACOLUMBAR REGION: ICD-10-CM

## 2024-05-09 DIAGNOSIS — M51.37 DDD (DEGENERATIVE DISC DISEASE), LUMBOSACRAL: ICD-10-CM

## 2024-05-09 DIAGNOSIS — M22.2X9 PATELLOFEMORAL PAIN SYNDROME, UNSPECIFIED LATERALITY: ICD-10-CM

## 2024-05-09 DIAGNOSIS — M54.16 LUMBAR RADICULOPATHY: ICD-10-CM

## 2024-05-09 DIAGNOSIS — M51.36 BULGE OF LUMBAR DISC WITHOUT MYELOPATHY: ICD-10-CM

## 2024-05-09 DIAGNOSIS — M47.816 FACET SYNDROME, LUMBAR: ICD-10-CM

## 2024-05-09 PROCEDURE — 99204 OFFICE O/P NEW MOD 45 MIN: CPT | Performed by: PHYSICAL MEDICINE & REHABILITATION

## 2024-05-09 NOTE — PATIENT INSTRUCTIONS
1) My office will call you to schedule the BILATERAL knee aspiration under ultrasound guidance once the procedure is approved by your insurance carrier.  We can consider HA and CSI.   2) Please get X-rays of the Lumbar spine, pelvis, and bilateral knees today on your way out.   3) Tylenol 500-1000 mg every 6-8 hours as needed for pain.  No more than 3000 mg daily.  4) We can consider lumbar medial branch blocks if physicla therapy is not helpful  5) Consider a left knee replacement  6) Please begin physical therapy as soon as possible. This can be done at the [facility]  7) Please see Shane Bucio for consultation on knee replacement  8) Please see podiatry for possible hammer toe and bunion issues  9) Please bring me DeXA scan results

## 2024-05-09 NOTE — H&P
[facility]  Clinic H&P    Requesting Physician: KEMAR JAY MD    Chief Complaint (Reason for Visit):    Chief Complaint   Patient presents with    Low Back Pain     New patient, referred by sister, comes in with bilateral low back and bilateral knee pain. Denies T/N. Admits weakness. Rates pain 1/10. XR L knee done in 2022. Denies PT symptoms began 1-2 months ago, denies injury. Takes Tylenol PRN.        History of Present Illness:  The patient is a 73 year old right-handed female with significant past medical history of hypertension and hyperlipidemia who presents with bilateral knee pain as well as bilateral low back pain without radicular symptoms.  Her low back pain began about 1 to 2 months ago and her knee pain has been ongoing for some time now.  She has seen Dr. Lees in orthopedics who recommended a knee replacement several years ago but she decided not to have this performed.  Today she rates her discomfort about a 2-3 out of 10 which is aggravated by standing and walking and is improved by sitting.  She denies any previous spine injections.  She has had x-rays of the left knee performed back in 2022 but nothing recently.  She denies any new x-rays of the lumbar spine.  She does states she may have a history of osteoporosis.  Her last DEXA scan that was found on epic was from 2021 and demonstrated osteopenia.  She has not been in physical therapy.  She has tried Tylenol for pain.  She denies any injections to the knees or low back within the last few years.  She denies paresthesias or focal weakness but does have difficulty going up stairs due to her knee issues.    PAST MEDICAL HISTORY:   Past Medical History:    Essential hypertension    High cholesterol       PAST SURGICAL HISTORY:   Past Surgical History:   Procedure Laterality Date    Cataract Left 01/21/2018    Cataract Right 07/31/2018    Hernia surgery  07/11/2016    Hysterectomy   09/08/2020    Total abdom hysterectomy          FAMILY HISTORY:   Family History   Problem Relation Age of Onset    Breast Cancer Sister         61-69    Breast Cancer Sister 40    Other (Parkinson's disease) Father     Seizure Disorder Daughter     Ovarian Cancer Neg        SOCIAL HISTORY:   Social History     Occupational History    Not on file   Tobacco Use    Smoking status: Never     Passive exposure: Yes    Smokeless tobacco: Never   Vaping Use    Vaping status: Never Used   Substance and Sexual Activity    Alcohol use: Never    Drug use: Never    Sexual activity: Not on file       CURRENT MEDICATIONS:   Current Outpatient Medications   Medication Sig Dispense Refill    fluvoxaMINE 50 MG Oral Tab Take 1 tablet (50 mg total) by mouth nightly. (Patient not taking: Reported on 5/26/2022) 90 tablet 0    albuterol 108 (90 Base) MCG/ACT Inhalation Aero Soln Inhale 2 puffs into the lungs every 4 (four) hours as needed for Wheezing or Shortness of Breath (cough). 1 each 3    Polyethylene Glycol 3350 (MIRALAX OR) Take by mouth.      acetaminophen 500 MG Oral Tab Take 1 tablet (500 mg total) by mouth daily as needed.      aspirin (ASPIRIN 81) 81 MG Oral Tab EC Take 1 tablet (81 mg total) by mouth daily. 365 tablet 3    FLUTICASONE PROPIONATE 50 MCG/ACT Nasal Suspension SHAKE LIQUID AND USE 2 SPRAYS IN EACH NOSTRIL EVERY DAY AS DIRECTED (Patient taking differently: 2 sprays by Nasal route daily.) 48 g 0    simvastatin 40 MG Oral Tab Take 1 tablet (40 mg total) by mouth nightly. FOR CHOLESTEROL. 90 tablet 3    Cholecalciferol 50 MCG (2000 UT) Oral Cap once daily          ALLERGIES:   Allergies   Allergen Reactions    Antispasmodic NAUSEA AND VOMITING     pt unsure of reaction    Chlorthalidone DIZZINESS    Belladonna Alk-Phenobarbital UNKNOWN     pt unsure of reaction    Latex RASH         REVIEW OF SYSTEMS:   Review of Systems   Constitutional: Negative.    HENT: Negative.    Eyes: Negative.    Respiratory: Negative.     Cardiovascular: Negative.    Gastrointestinal: Negative.    Genitourinary: Negative.    Musculoskeletal: As per HPI   Skin: Negative.    Neurological: As per HPI  Endo/Heme/Allergies: Negative.    Psychiatric/Behavioral: Negative.      All other systems reviewed and are negative. Pertinent positives and negatives noted in the HPI.        PHYSICAL EXAM:   Ht 62\"   Wt 131 lb (59.4 kg)   BMI 23.96 kg/m²     Body mass index is 23.96 kg/m².      General: No immediate distress  Head: Normocephalic/ Atraumatic  Eyes: Extra-occular movements intact.   Ears: No auricular hematoma or deformities  Mouth: No lesions or ulcerations  Heart: peripheral pulses intact. Normal capillary refill.   Lungs: Non-labored respirations  Abdomen: No abdominal guarding  Extremities: No lower extremity edema bilaterally   Skin: No lesions noted.   Cognition: alert & oriented x 3, attentive, able to follow 2 step commands, comprehention intact, spontaneous speech intact  Motor:    Musculoskeletal:    LUMBAR SPINE:  Inspection: no erythema, swelling, or obvious deformity.  Their iliac crest and shoulder heights are symmetrical.  Postural exam reveals scoliosis  Palpation: Tender to palpation over the bilateral lower lumbar facets and paraspinals  ROM: Full active range of motion of the lumbar spine with pain during extension  Motor: 5/5 in all myotomes of the BILATERAL lower extremities except 4 out of 5 during left great toe extension and plantarflexion (L5 and S1)  Sensation: Intact to light touch in all dermatomes of the lower extremities   Reflexes: 1/4 at L4 and S1  Facet Loading: Positive bilaterally  Slump test: negative for pain symptoms or radicular pain symptoms  FADIR: Positive on the right     KNEE:  Inspection: Genu valgum on the left  Palpation: Tender to palpation over the medial and lateral patellar facet bilaterally  ROM: Full active ROM in flexion and extension  Luisito Test: Positive bilaterally      Gait:  Normal with genu  marlena on the left    Data  No visits with results within 6 Month(s) from this visit.   Latest known visit with results is:   [facility] Lab Encounter on 06/06/2023   Component Date Value Ref Range Status    Cholesterol, Total 06/06/2023 139  <200 mg/dL Final    HDL Cholesterol 06/06/2023 68 (H)  40 - 59 mg/dL Final    Triglycerides 06/06/2023 63  30 - 149 mg/dL Final    LDL Cholesterol 06/06/2023 58  <100 mg/dL Final    VLDL 06/06/2023 9  0 - 30 mg/dL Final    Non HDL Chol 06/06/2023 71  <130 mg/dL Final    Patient Fasting for Lipid? 06/06/2023 Yes   Final    Glucose 06/06/2023 92  70 - 99 mg/dL Final    Sodium 06/06/2023 144  136 - 145 mmol/L Final    Potassium 06/06/2023 4.4  3.5 - 5.1 mmol/L Final    Chloride 06/06/2023 111  98 - 112 mmol/L Final    CO2 06/06/2023 28.0  21.0 - 32.0 mmol/L Final    Anion Gap 06/06/2023 5  0 - 18 mmol/L Final    BUN 06/06/2023 11  7 - 18 mg/dL Final    Creatinine 06/06/2023 0.69  0.55 - 1.02 mg/dL Final    BUN/CREA Ratio 06/06/2023 15.9  10.0 - 20.0 Final    Calcium, Total 06/06/2023 9.6  8.5 - 10.1 mg/dL Final    Calculated Osmolality 06/06/2023 297 (H)  275 - 295 mOsm/kg Final    eGFR-Cr 06/06/2023 92  >=60 mL/min/1.73m2 Final    ALT 06/06/2023 16  13 - 56 U/L Final    AST 06/06/2023 15  15 - 37 U/L Final    Alkaline Phosphatase 06/06/2023 61  55 - 142 U/L Final    Bilirubin, Total 06/06/2023 0.7  0.1 - 2.0 mg/dL Final    Total Protein 06/06/2023 6.7  6.4 - 8.2 g/dL Final    Albumin 06/06/2023 3.9  3.4 - 5.0 g/dL Final    Globulin  06/06/2023 2.8  2.8 - 4.4 g/dL Final    A/G Ratio 06/06/2023 1.4  1.0 - 2.0 Final    Patient Fasting for CMP? 06/06/2023 Yes   Final   ]      Radiology Imaging:  I reviewed with the patient her Dexa Scan of the whole body   PROCEDURE: XR DEXA BONE DENSITOMETRY (CPT=77080)     COMPARISON: None.     INDICATIONS: Screening for osteoporosis Z78.0 Asymptomatic menopausal state     TECHNIQUE: Measurement of bone mineral density of the lumbar spine and hip was  performed on a Hologic dual energy x-ray absorptiometry scanner.     FINDINGS:     LEFT FEMORAL NECK  BMD: 0.618 gm/sq. cm. T SCORE: -2.1 Z SCORE: -0.3     LEFT TOTAL HIP  BMD: 0.771 gm/sq. cm. T SCORE: -1.4 Z SCORE: 0.1     PA LUMBAR SPINE (L1 - L4)  BMD: 1.196 gm/sq. cm. T SCORE: 1.4 Z SCORE: 3.4        T scores are a comparison to sex-matched patients with mean peak bone mass and are given in standard deviation (s.d.).  Each 1 s.d. corresponds to approximately 10% below peak normal bone density.       WOR[employer]IZATION CRITERIA  NORMAL T SCORE: Above -1 s.d.    OSTEOPENIA T SCORE: Between -1 and -2.5 s.d.    OSTEOPOROSIS T SCORE: -2.5 s.d.     National Osteoporosis Foundation Clinician's Guide to Prevention and Treatment of Osteoporosis recommendations for treatment:  Post menopausal women and men age 50 and older presenting with the following should be considered for treatment:  * A hip or vertebral (clinical or morphometric) fracture  * T score < -2.5 at the femoral neck or spine after appropriate evaluation to exclude secondary causes.  * Low bone mass (T score between -1.0 and -2.5 at the femoral neck or spine) and a 10-year probability of a hip fracture > 3% or a 10-year probability of a major osteoporosis-related fracture > 20% based on the [place]-adapted WHO algorithm               Impression   CONCLUSION:  1. Osteopenia with increased fracture risk.     10 year Fracture Risk:  Major Osteoporotic Fracture:  12 %.  Hip Fracture:  2.4 %.        Dictated by (CST): Chadwick Victor MD on 3/11/2021 at 7:41 AM      Finalized by (CST): Chadwick Victor MD on 3/11/2021 at 7:50 AM             ASSESSMENT AND PLAN:  Zaida is a pleasant 73-year-old female who presents for complaints of bilateral low back pain, bilateral knee pain, and right hip pain.  I believe her low back pain is due to facet arthropathy stemming from her scoliosis.  I believe her knee pain is due to her severe osteoarthritis and patellofemoral  syndrome.  I have ordered x-rays of the lumbar spine, pelvis, and knees to further investigate her symptoms.  I have also recommended bilateral knee aspiration under ultrasound guidance.  We can consider hyaluronic acid and corticosteroid injections in the future once we have further information from her post recent DEXA scan.  She can use Tylenol for pain.  We can consider lumbar medial branch blocks if physical therapy is not helpful for her low back.  She should also follow-up with Dr. Lees for consultation regarding knee replacements.  I have also given her a referral to see podiatry for hammertoe and bunion issues.  She will follow-up with me in about 2 months or sooner for the knee aspirations.       RTC in 2 months or sooner for the knee aspiration    Discharge Instructions were provided as documented in AVS summary.  The patient was in agreement with the assessment and plan.  All questions were answered.  There were no barriers to learning.         1. Myalgia    2. Facet syndrome, lumbar    3. Mechanical low back pain    4. Lumbar spondylosis    5. DDD (degenerative disc disease), lumbosacral    6. Lumbar foraminal stenosis    7. Bulge of lumbar disc without myelopathy    8. Lumbar radiculopathy    9. Strain of gluteus medius, unspecified laterality, initial encounter    10. Primary osteoarthritis of knee, unspecified laterality    11. 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 Right hip pain    14. Thoracogenic scoliosis of thoracolumbar region        Alex B. Behar MD, [facility]  Physical Medicine and Rehabilitation/Sports Medicine  [facility]

## 2024-05-09 NOTE — TELEPHONE ENCOUNTER
Left a message for the patient.  Reviewed the 3 audiograms the 1 in 2013, 1 in 2019, and 1 in 2020 as well as the one in 2023.  The 2013 and 2019 show an asymmetric left-sided hearing loss which is also present in the 2020 and the 2023 audiogram.  As this is the case it is not worrisome and has been relatively stable.  There was a low-frequency loss on the 2020 audiogram which is not seen on any other audiogram and I think this may just either have been an error in the hearing test or may be there was something like fluid at the time although the tympanogram does not look that way in either case it seems to have resolved and I would not make much of it

## 2024-05-11 ENCOUNTER — HOSPITAL ENCOUNTER (OUTPATIENT)
Dept: GENERAL RADIOLOGY | Facility: HOSPITAL | Age: 74
Discharge: HOME | End: 2024-05-11
Attending: PHYSICAL MEDICINE & REHABILITATION

## 2024-05-11 DIAGNOSIS — M54.16 LUMBAR RADICULOPATHY: ICD-10-CM

## 2024-05-11 DIAGNOSIS — M41.35 THORACOGENIC SCOLIOSIS OF THORACOLUMBAR REGION: ICD-10-CM

## 2024-05-11 DIAGNOSIS — M79.10 MYALGIA: ICD-10-CM

## 2024-05-11 DIAGNOSIS — M47.816 LUMBAR SPONDYLOSIS: ICD-10-CM

## 2024-05-11 DIAGNOSIS — M51.37 DDD (DEGENERATIVE DISC DISEASE), LUMBOSACRAL: ICD-10-CM

## 2024-05-11 DIAGNOSIS — M54.59 MECHANICAL LOW BACK PAIN: ICD-10-CM

## 2024-05-11 DIAGNOSIS — M48.061 LUMBAR FORAMINAL STENOSIS: ICD-10-CM

## 2024-05-11 DIAGNOSIS — M25.551 RIGHT HIP PAIN: ICD-10-CM

## 2024-05-11 DIAGNOSIS — M17.10 PRIMARY OSTEOARTHRITIS OF KNEE, UNSPECIFIED LATERALITY: ICD-10-CM

## 2024-05-11 DIAGNOSIS — M51.36 BULGE OF LUMBAR DISC WITHOUT MYELOPATHY: ICD-10-CM

## 2024-05-11 DIAGNOSIS — M22.2X9 PATELLOFEMORAL PAIN SYNDROME, UNSPECIFIED LATERALITY: ICD-10-CM

## 2024-05-11 DIAGNOSIS — M47.816 FACET SYNDROME, LUMBAR: ICD-10-CM

## 2024-05-11 DIAGNOSIS — Q74.1 CONGENITAL GENU VALGUS: ICD-10-CM

## 2024-05-11 DIAGNOSIS — S76.019A STRAIN OF GLUTEUS MEDIUS, UNSPECIFIED LATERALITY, INITIAL ENCOUNTER: ICD-10-CM

## 2024-05-11 PROCEDURE — 73564 X-RAY EXAM KNEE 4 OR MORE: CPT | Performed by: PHYSICAL MEDICINE & REHABILITATION

## 2024-05-11 PROCEDURE — 73521 X-RAY EXAM HIPS BI 2 VIEWS: CPT | Performed by: PHYSICAL MEDICINE & REHABILITATION

## 2024-05-11 PROCEDURE — 72110 X-RAY EXAM L-2 SPINE 4/>VWS: CPT | Performed by: PHYSICAL MEDICINE & REHABILITATION

## 2024-05-16 ENCOUNTER — TELEPHONE (OUTPATIENT)
Dept: PHYSICAL MEDICINE AND REHAB | Facility: CLINIC | Age: 74
End: 2024-05-16

## 2024-05-22 ENCOUNTER — ORDER TRANSCRIPTION (OUTPATIENT)
Dept: ADMINISTRATIVE | Facility: HOSPITAL | Age: 74
End: 2024-05-22

## 2024-05-22 DIAGNOSIS — Z12.31 ENCOUNTER FOR SCREENING MAMMOGRAM FOR MALIGNANT NEOPLASM OF BREAST: Primary | ICD-10-CM

## 2024-05-23 NOTE — TELEPHONE ENCOUNTER
Patient is currently scheduled for a follow up appointment with Dr.Behar on 6/6/24 to review xray results and next steps.     Nothing further needed at this time.

## 2024-05-30 ENCOUNTER — TELEPHONE (OUTPATIENT)
Dept: PHYSICAL MEDICINE AND REHAB | Facility: CLINIC | Age: 74
End: 2024-05-30

## 2024-05-30 ENCOUNTER — MED REC SCAN ONLY (OUTPATIENT)
Dept: PHYSICAL MEDICINE AND REHAB | Facility: CLINIC | Age: 74
End: 2024-05-30

## 2024-06-06 ENCOUNTER — OFFICE VISIT (OUTPATIENT)
Dept: PHYSICAL MEDICINE AND REHAB | Facility: CLINIC | Age: 74
End: 2024-06-06
Payer: MEDICARE

## 2024-06-06 VITALS — BODY MASS INDEX: 24.11 KG/M2 | WEIGHT: 131 LBS | HEIGHT: 62 IN

## 2024-06-06 DIAGNOSIS — M51.37 DDD (DEGENERATIVE DISC DISEASE), LUMBOSACRAL: ICD-10-CM

## 2024-06-06 DIAGNOSIS — M17.10 PRIMARY OSTEOARTHRITIS OF KNEE, UNSPECIFIED LATERALITY: ICD-10-CM

## 2024-06-06 DIAGNOSIS — M79.10 MYALGIA: Primary | ICD-10-CM

## 2024-06-06 DIAGNOSIS — M47.816 LUMBAR SPONDYLOSIS: ICD-10-CM

## 2024-06-06 DIAGNOSIS — M41.35 THORACOGENIC SCOLIOSIS OF THORACOLUMBAR REGION: ICD-10-CM

## 2024-06-06 DIAGNOSIS — M48.061 LUMBAR FORAMINAL STENOSIS: ICD-10-CM

## 2024-06-06 DIAGNOSIS — M25.551 RIGHT HIP PAIN: ICD-10-CM

## 2024-06-06 DIAGNOSIS — Q74.1 CONGENITAL GENU VALGUS: ICD-10-CM

## 2024-06-06 DIAGNOSIS — M47.816 FACET SYNDROME, LUMBAR: ICD-10-CM

## 2024-06-06 DIAGNOSIS — S76.019A STRAIN OF GLUTEUS MEDIUS, UNSPECIFIED LATERALITY, INITIAL ENCOUNTER: ICD-10-CM

## 2024-06-06 DIAGNOSIS — M54.16 LUMBAR RADICULOPATHY: ICD-10-CM

## 2024-06-06 DIAGNOSIS — M54.59 MECHANICAL LOW BACK PAIN: ICD-10-CM

## 2024-06-06 DIAGNOSIS — M22.2X9 PATELLOFEMORAL PAIN SYNDROME, UNSPECIFIED LATERALITY: ICD-10-CM

## 2024-06-06 DIAGNOSIS — M51.36 BULGE OF LUMBAR DISC WITHOUT MYELOPATHY: ICD-10-CM

## 2024-06-06 PROCEDURE — 99214 OFFICE O/P EST MOD 30 MIN: CPT | Performed by: PHYSICAL MEDICINE & REHABILITATION

## 2024-06-06 NOTE — PATIENT INSTRUCTIONS
1) Continue plan to begin physical therapy  2) IF physical therapy is not helpful for the back, hip, or knee, then we can consider nerve blocks and potential ablation  3) Continue plan to see Dr. Bucio for knee replacement  4) Lets touch base in about 6 weeks.   5) Tylenol 500-1000 mg every 6-8 hours as needed for pain.  No more than 3000 mg daily.

## 2024-06-06 NOTE — PROGRESS NOTES
[facility]  Progress Note    CHIEF COMPLAINT:    Chief Complaint   Patient presents with    Follow - Up     LOV: 5/9/2024 pt comes in bilateral low back and R groin aching pain. Admits bilateral knee weakness. Denies T/N. Rates pain 6/10. Takes tylenol 1000mg BID. Scheduled to begin PT. Completed XR's. Scheduled for consult with Nabeel Mustafa. States DEXA scan was faxed in.        History of Present Illness:  The patient is a 73 year old right-handed female with significant past medical history of hypertension and hyperlipidemia who presents with bilateral knee pain as well as bilateral low back pain without radicular symptoms.  She did have x-rays of the lumbar spine, pelvis, and bilateral knees performed which I independently reviewed with her today.  She rates her pain a 6 out of 10.  She has to walk slumped forward in the morning and then as the day progresses she starts to straighten up.  She is scheduled to begin physical therapy on June 13.  She is also scheduled to see orthopedics next week for a discussion regarding knee replacement.    PAST MEDICAL HISTORY:  Past Medical History:    Essential hypertension    High cholesterol       SURGICAL HISTORY:  Past Surgical History:   Procedure Laterality Date    Cataract Left 01/21/2018    Cataract Right 07/31/2018    Hernia surgery  07/11/2016    Hysterectomy  09/08/2020    Total abdom hysterectomy         SOCIAL HISTORY:   Social History     Occupational History    Not on file   Tobacco Use    Smoking status: Never     Passive exposure: Yes    Smokeless tobacco: Never   Vaping Use    Vaping status: Never Used   Substance and Sexual Activity    Alcohol use: Never    Drug use: Never    Sexual activity: Not on file       FAMILY HISTORY:   Family History   Problem Relation Age of Onset    Breast Cancer Sister         61-69    Breast Cancer Sister 40    Other (Parkinson's disease) Father     Seizure Disorder Daughter      Ovarian Cancer Neg        CURRENT MEDICATIONS:   Current Outpatient Medications   Medication Sig Dispense Refill    fluvoxaMINE 50 MG Oral Tab Take 1 tablet (50 mg total) by mouth nightly. (Patient not taking: Reported on 5/26/2022) 90 tablet 0    albuterol 108 (90 Base) MCG/ACT Inhalation Aero Soln Inhale 2 puffs into the lungs every 4 (four) hours as needed for Wheezing or Shortness of Breath (cough). 1 each 3    Polyethylene Glycol 3350 (MIRALAX OR) Take by mouth.      acetaminophen 500 MG Oral Tab Take 1 tablet (500 mg total) by mouth daily as needed.      aspirin (ASPIRIN 81) 81 MG Oral Tab EC Take 1 tablet (81 mg total) by mouth daily. 365 tablet 3    FLUTICASONE PROPIONATE 50 MCG/ACT Nasal Suspension SHAKE LIQUID AND USE 2 SPRAYS IN EACH NOSTRIL EVERY DAY AS DIRECTED (Patient taking differently: 2 sprays by Nasal route daily.) 48 g 0    simvastatin 40 MG Oral Tab Take 1 tablet (40 mg total) by mouth nightly. FOR CHOLESTEROL. 90 tablet 3    Cholecalciferol 50 MCG (2000 UT) Oral Cap once daily         ALLERGIES:   Allergies   Allergen Reactions    Antispasmodic NAUSEA AND VOMITING     pt unsure of reaction    Chlorthalidone DIZZINESS    Belladonna Alk-Phenobarbital UNKNOWN     pt unsure of reaction    Latex RASH       REVIEW OF SYSTEMS:   Review of Systems   Constitutional: Negative.    HENT: Negative.    Eyes: Negative.    Respiratory: Negative.    Cardiovascular: Negative.    Gastrointestinal: Negative.    Genitourinary: Negative.    Musculoskeletal: As per HPI  Skin: Negative.    Neurological: As per HPI  Endo/Heme/Allergies: Negative.    Psychiatric/Behavioral: Negative.      All other systems reviewed and are negative. Pertinent positives and negatives noted in the HPI.        PHYSICAL EXAM:   Ht 62\"   Wt 131 lb (59.4 kg)   BMI 23.96 kg/m²     Body mass index is 23.96 kg/m².      General: No immediate distress  Head: Normocephalic/ Atraumatic  Eyes: Extra-occular movements intact.   Ears: No auricular  hematoma or deformities  Mouth: No lesions or ulcerations  Heart: peripheral pulses intact. Normal capillary refill.   Lungs: Non-labored respirations  Abdomen: No abdominal guarding  Extremities: No lower extremity edema bilaterally   Skin: No lesions noted.   Cognition: alert & oriented x 3, attentive, able to follow 2 step commands, comprehention intact, spontaneous speech intact  Motor:    Musculoskeletal:        Data  No visits with results within 6 Month(s) from this visit.   Latest known visit with results is:   [facility] Lab Encounter on 06/06/2023   Component Date Value Ref Range Status    Cholesterol, Total 06/06/2023 139  <200 mg/dL Final    HDL Cholesterol 06/06/2023 68 (H)  40 - 59 mg/dL Final    Triglycerides 06/06/2023 63  30 - 149 mg/dL Final    LDL Cholesterol 06/06/2023 58  <100 mg/dL Final    VLDL 06/06/2023 9  0 - 30 mg/dL Final    Non HDL Chol 06/06/2023 71  <130 mg/dL Final    Patient Fasting for Lipid? 06/06/2023 Yes   Final    Glucose 06/06/2023 92  70 - 99 mg/dL Final    Sodium 06/06/2023 144  136 - 145 mmol/L Final    Potassium 06/06/2023 4.4  3.5 - 5.1 mmol/L Final    Chloride 06/06/2023 111  98 - 112 mmol/L Final    CO2 06/06/2023 28.0  21.0 - 32.0 mmol/L Final    Anion Gap 06/06/2023 5  0 - 18 mmol/L Final    BUN 06/06/2023 11  7 - 18 mg/dL Final    Creatinine 06/06/2023 0.69  0.55 - 1.02 mg/dL Final    BUN/CREA Ratio 06/06/2023 15.9  10.0 - 20.0 Final    Calcium, Total 06/06/2023 9.6  8.5 - 10.1 mg/dL Final    Calculated Osmolality 06/06/2023 297 (H)  275 - 295 mOsm/kg Final    eGFR-Cr 06/06/2023 92  >=60 mL/min/1.73m2 Final    ALT 06/06/2023 16  13 - 56 U/L Final    AST 06/06/2023 15  15 - 37 U/L Final    Alkaline Phosphatase 06/06/2023 61  55 - 142 U/L Final    Bilirubin, Total 06/06/2023 0.7  0.1 - 2.0 mg/dL Final    Total Protein 06/06/2023 6.7  6.4 - 8.2 g/dL Final    Albumin 06/06/2023 3.9  3.4 - 5.0 g/dL Final    Globulin  06/06/2023 2.8  2.8 - 4.4 g/dL Final    A/G Ratio 06/06/2023  1.4  1.0 - 2.0 Final    Patient Fasting for CMP? 06/06/2023 Yes   Final   ]      Radiology Imaging:  I reviewed with the patient her X-ray of the lumbar spine, pelvis bilateral, and knees bilateral from 5/11/2024  XR LUMBAR SPINE AP LAT FLEX EXT (CPT=72110)  Narrative: PROCEDURE: XR LUMBAR SPINE AP LAT FLEX EXT EM (CPT=72120)     COMPARISON: [facility], CT ABDOMEN+PELVIS (CPT=74176), 2/11/2022, 2:57 PM.     INDICATIONS: Chronic low back pain.     TECHNIQUE: Lumbar spine radiographs, 4 views (AP, lateral, flexion, and extension views)       FINDINGS:       ALIGNMENT:   Minimal degenerative left lateral listhesis of L3 on L4, and L4 on L5.  Moderate levoscoliosis.  VERTEBRAL BODIES:   Intact.  DISC SPACES: Multilevel advanced degenerative disc disease and moderate to advanced facet arthrosis.  FLEXION/EXTENSION VIEWS:   Restricted range of motion.  No subluxation during flexion and extension.    OTHER:   3 cm calcified gallstone.  Atherosclerotic vascular calcification              Impression: CONCLUSION:   1. Levoscoliosis with multilevel advanced degenerative disc disease and moderate to advanced facet arthrosis.  2. 3 cm calcified gallstone.           Dictated by (CST): Bill King MD on 5/11/2024 at 10:58 PM       Finalized by (CST): Bill King MD on 5/11/2024 at 11:02 PM          XR KNEE COMPLETE BILAT EM(CPT=73564-50)  Narrative: PROCEDURE: XR KNEE COMPLETE BILAT EM     COMPARISON: [facility] 2nd Floor, XR KNEE, COMPLETE (4 OR MORE VIEWS), LEFT (CPT=73564), 5/26/2022, 10:02 AM.     INDICATIONS: Chronic bilateral knee pain.  Left knee worse.     TECHNIQUE: Total of 7 views-three views right knee and four views left knee were obtained.     FINDINGS:   BONES: No suspect bone lesion or fracture. Bilateral left greater than right valgus.  Bone-on-bone narrowing of the left lateral compartment with relative widening of the medial compartment.  Left lateral  compartment articular surface remodeling and   osteophyte formation.  Moderate to severe narrowing of the right lateral compartment.  Moderate right and advanced left patellofemoral compartment osteoarthritis.  SOFT TISSUES: Negative. No visible soft tissue swelling.   EFFUSION: None visible.   OTHER: Negative.               Impression: CONCLUSION:   1. Advanced osteoarthritis left knee lateral and patellofemoral compartments.  2. Moderate to advanced osteoarthritis lateral compartment right knee and moderate patellofemoral compartment osteoarthritis.             Dictated by (CST): Bill King MD on 5/11/2024 at 10:36 PM       Finalized by (CST): Bill King MD on 5/11/2024 at 10:40 PM          XR HIP W OR WO PELVIS 2 VIEWS BILAT(CPT=73521)  Narrative: PROCEDURE: XR HIP W OR WO PELVIS 2 VIEWS BILAT(CPT=73521)     COMPARISON: None.     INDICATIONS: Chronic right hip pain.     TECHNIQUE: 2 views were obtained- both views included both hips and pelvis..       FINDINGS:   BONES: Moderate to advanced right hip osteoarthritis.  Mild left hip osteoarthritis.  Degenerative changes in lower spine and to lesser extent SI joints.  Degenerative changes of pubic symphysis.  SOFT TISSUES: Negative. No visible soft tissue swelling.   EFFUSION: None visible.   OTHER: Atherosclerotic vascular calcification              Impression: CONCLUSION:   1. Moderate to advanced right hip and mild left hip osteoarthritis.           Dictated by (CST): Bill King MD on 5/11/2024 at 10:34 PM       Finalized by (CST): Bill King MD on 5/11/2024 at 10:36 PM              ASSESSMENT AND PLAN:  Zaida is a pleasant 73-year-old female presents for follow-up of her bilateral low back pain which I believe is due to lumbar spondylosis with facet arthropathy stemming from her scoliosis.  She is also following up for her right hip pain and pelvic pain as well as by lateral knee pain due to osteoarthritis.  I am recommending she continue  follow-up with Dr. Lees to discuss potential left knee replacement.  She has severe osteoarthritis.  As a pertains to her lumbar spine, we can consider medial branch blocks but I would recommend first off to try a full course of formal physical therapy.  She should use Tylenol for pain and I will see her again in about 6 to 8 weeks.       RTC in 6 to 8 weeks  Discharge Instructions were provided as documented in AVS summary.  The patient was in agreement with the assessment and plan.  All questions were answered.  There were no barriers to learning.         1. Myalgia    2. Facet syndrome, lumbar    3. Mechanical low back pain    4. Lumbar spondylosis    5. DDD (degenerative disc disease), lumbosacral    6. Lumbar foraminal stenosis    7. Bulge of lumbar disc without myelopathy    8. Lumbar radiculopathy    9. Strain of gluteus medius, unspecified laterality, initial encounter    10. Primary osteoarthritis of knee, unspecified laterality    11. 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 Right hip pain    14. Thoracogenic scoliosis of thoracolumbar region        Alex B. Behar MD  Physical Medicine and Rehabilitation/Sports Medicine  [facility]

## 2024-06-12 ENCOUNTER — OFFICE VISIT (OUTPATIENT)
Dept: ORTHOPEDICS CLINIC | Facility: CLINIC | Age: 74
End: 2024-06-12

## 2024-06-12 VITALS — DIASTOLIC BLOOD PRESSURE: 83 MMHG | SYSTOLIC BLOOD PRESSURE: 134 MMHG | HEART RATE: 76 BPM

## 2024-06-12 DIAGNOSIS — M17.12 PRIMARY OSTEOARTHRITIS OF LEFT KNEE: Primary | ICD-10-CM

## 2024-06-12 DIAGNOSIS — M79.605 PAIN IN BOTH LOWER EXTREMITIES: ICD-10-CM

## 2024-06-12 DIAGNOSIS — M79.604 PAIN IN BOTH LOWER EXTREMITIES: ICD-10-CM

## 2024-06-12 PROCEDURE — 99214 OFFICE O/P EST MOD 30 MIN: CPT | Performed by: ORTHOPAEDIC SURGERY

## 2024-06-12 NOTE — PROGRESS NOTES
NURSING INTAKE COMMENTS:   Chief Complaint   Patient presents with    Knee Pain     Left f/u - was seen back in 2022 - was seen in May 2024 by Dr Behar and has x-rays in the system - states she has no pain now but she wants to discuss sx        HPI: This 73 year old female presents today with complaints of bilateral knee pain and deformities left worse than right.  She has developed progressive valgus deformity of her left knee.  She has moderate pain.  She has difficulty walking distances due to the problem.  She has some discomfort and difficulty going up and down the stairs due to knee instability and deformity.  She has a history of osteoporosis.  She lives at [facility].  She takes Tylenol intermittently for the discomfort.  No recent injury to the knee.  No significant hip or groin pain.    Past Medical History:    Essential hypertension    High cholesterol     Past Surgical History:   Procedure Laterality Date    Cataract Left 01/21/2018    Cataract Right 07/31/2018    Hernia surgery  07/11/2016    Hysterectomy  09/08/2020    Total abdom hysterectomy       Current Outpatient Medications   Medication Sig Dispense Refill    fluvoxaMINE 50 MG Oral Tab Take 1 tablet (50 mg total) by mouth nightly. (Patient not taking: Reported on 5/26/2022) 90 tablet 0    albuterol 108 (90 Base) MCG/ACT Inhalation Aero Soln Inhale 2 puffs into the lungs every 4 (four) hours as needed for Wheezing or Shortness of Breath (cough). 1 each 3    Polyethylene Glycol 3350 (MIRALAX OR) Take by mouth.      acetaminophen 500 MG Oral Tab Take 1 tablet (500 mg total) by mouth daily as needed.      aspirin (ASPIRIN 81) 81 MG Oral Tab EC Take 1 tablet (81 mg total) by mouth daily. 365 tablet 3    FLUTICASONE PROPIONATE 50 MCG/ACT Nasal Suspension SHAKE LIQUID AND USE 2 SPRAYS IN EACH NOSTRIL EVERY DAY AS DIRECTED (Patient taking differently: 2 sprays by Nasal route daily.) 48 g 0    simvastatin 40 MG Oral Tab Take 1 tablet (40 mg total) by mouth  nightly. FOR CHOLESTEROL. 90 tablet 3    Cholecalciferol 50 MCG (2000 UT) Oral Cap once daily       Allergies   Allergen Reactions    Antispasmodic NAUSEA AND VOMITING     pt unsure of reaction    Chlorthalidone DIZZINESS    Belladonna Alk-Phenobarbital UNKNOWN     pt unsure of reaction    Latex RASH     Family History   Problem Relation Age of Onset    Breast Cancer Sister         61-69    Breast Cancer Sister 40    Other (Parkinson's disease) Father     Seizure Disorder Daughter     Ovarian Cancer Neg        Social History     Occupational History    Not on file   Tobacco Use    Smoking status: Never     Passive exposure: Yes    Smokeless tobacco: Never   Vaping Use    Vaping status: Never Used   Substance and Sexual Activity    Alcohol use: Never    Drug use: Never    Sexual activity: Not on file        Review of Systems:  GENERAL: denies fevers, chills, night sweats, fatigue, unintentional weight loss/gain  SKIN: denies skin lesions, open sores, rash  HEENT:denies recent vision change, new nasal congestion,hearing loss, tinnitus, sore throat, headaches  RESPIRATORY: denies new shortness of breath, cough, asthma, wheezing  CARDIOVASCULAR: denies chest pain, leg cramps with exertion, palpitations, leg swelling  GI: denies abdominal pain, nausea, vomiting, diarrhea, constipation, hematochezia, worsening heartburn or stomach ulcers  : denies dysuria, hematuria, incontinence, increased frequency, urgency, difficulty urinating  MUSCULOSKELETAL: denies musculoskeletal complaints other than in HPI  NEURO: denies numbness, tingling, weakness, balance issues, dizziness, memory loss  PSYCHIATRIC: denies Hx of depression, anxiety, other psychiatric disorders  HEMATOLOGIC: denies blood clots, anemia, blood clotting disorders, blood transfusion  ENDOCRINE: denies autoimmune disease, thyroid issues, or diabetes  ALLERGY: denies asthma, seasonal allergies    Physical Examination:    /83   Pulse 76   Constitutional:  appears well hydrated, alert and responsive, no acute distress noted  Extremities: Severe valgus deformity left knee measuring approximately 30 or 40 degrees.  This is partially passively correctable.  There is a trace effusion in the left knee.  1+ effusion right knee.  Right knee also has a smaller valgus deformity.  Good varus valgus stability at 30 degrees and full extension.  Lachman sign is equivocal.  Posterior drawer negative.  Knee range of motion is from 0 to 120 degrees bilaterally.  No obvious flexion contracture appreciated.  No calf swelling but there is mild tenderness along the medial aspect of the right calf and mid aspect of the left calf.  Neurological: Light touch and pinprick sensation intact throughout the lower extremities.  Ankle dorsiflexion plantarflexion EHL knee extension and hip flexion strength are 5 out of 5 bilaterally.  No clonus.    Imaging:   X-rays of both knees show advanced degenerative changes left knee lateral compartment.  There are tricompartmental degenerative changes in both knees..     Labs:  Lab Results   Component Value Date    WBC 3.9 (L) 12/19/2022    HGB 13.3 12/19/2022    .0 12/19/2022      Lab Results   Component Value Date    GLU 92 06/06/2023    BUN 11 06/06/2023    CREATSERUM 0.69 06/06/2023    GFRNAA 78 01/14/2022    GFRAA 90 01/14/2022        Assessment and Plan:  Diagnoses and all orders for this visit:    Primary osteoarthritis of left knee  -     MRI KNEE SARAH, LEFT (XVR=88842); Future    Pain in both lower extremities  -     MRI KNEE SARAH, LEFT (TLO=46627); Future  -     US VENOUS DOPPLER LEG LEFT - DIAG IMG (CPT=93971); Future  -     US VENOUS DOPPLER LEG RIGHT - DIAG IMG (CPT=93971); Future        Assessment: Bilateral knee osteoarthritis, primary, severe.  Significant valgus deformity bilateral knees.    Plan: I discussed operative and nonoperative treatment options.  She would like to proceed with surgical treatment of her left knee given  the progressive deformity.  Advised venous ultrasound of both lower legs given her calf pain today.  This would be prior to surgery.  I ordered MRI imaging of the left hip knee and ankle for preoperative computer navigation patient specific instrumentation using the Jenae protocol.  Advised mental and dental evaluation prior to surgery for restratification.  Discussed risks and benefits of surgery.  Questions were answered.  No guarantees were made.  Follow-up again on the date of the procedure.    Follow Up: Return for follow-up visit two weeks after surgery.    YANIV GUERRERO MD

## 2024-06-13 ENCOUNTER — OFFICE VISIT (OUTPATIENT)
Dept: PHYSICAL THERAPY | Age: 74
End: 2024-06-13
Attending: PHYSICAL MEDICINE & REHABILITATION
Payer: MEDICARE

## 2024-06-13 ENCOUNTER — HOSPITAL ENCOUNTER (OUTPATIENT)
Dept: MRI IMAGING | Facility: HOSPITAL | Age: 74
Discharge: HOME | End: 2024-06-13
Attending: ORTHOPAEDIC SURGERY
Payer: MEDICARE

## 2024-06-13 DIAGNOSIS — M79.604 PAIN IN BOTH LOWER EXTREMITIES: ICD-10-CM

## 2024-06-13 DIAGNOSIS — M54.59 MECHANICAL LOW BACK PAIN: ICD-10-CM

## 2024-06-13 DIAGNOSIS — M17.10 PRIMARY OSTEOARTHRITIS OF KNEE, UNSPECIFIED LATERALITY: ICD-10-CM

## 2024-06-13 DIAGNOSIS — M17.12 PRIMARY OSTEOARTHRITIS OF LEFT KNEE: ICD-10-CM

## 2024-06-13 DIAGNOSIS — M22.2X9 PATELLOFEMORAL PAIN SYNDROME, UNSPECIFIED LATERALITY: ICD-10-CM

## 2024-06-13 DIAGNOSIS — M41.35 THORACOGENIC SCOLIOSIS OF THORACOLUMBAR REGION: ICD-10-CM

## 2024-06-13 DIAGNOSIS — M48.061 LUMBAR FORAMINAL STENOSIS: ICD-10-CM

## 2024-06-13 DIAGNOSIS — M51.36 BULGE OF LUMBAR DISC WITHOUT MYELOPATHY: ICD-10-CM

## 2024-06-13 DIAGNOSIS — S76.019A STRAIN OF GLUTEUS MEDIUS, UNSPECIFIED LATERALITY, INITIAL ENCOUNTER: ICD-10-CM

## 2024-06-13 DIAGNOSIS — M79.605 PAIN IN BOTH LOWER EXTREMITIES: ICD-10-CM

## 2024-06-13 DIAGNOSIS — M47.816 LUMBAR SPONDYLOSIS: ICD-10-CM

## 2024-06-13 DIAGNOSIS — M54.16 LUMBAR RADICULOPATHY: ICD-10-CM

## 2024-06-13 DIAGNOSIS — M25.551 RIGHT HIP PAIN: ICD-10-CM

## 2024-06-13 DIAGNOSIS — M47.816 FACET SYNDROME, LUMBAR: ICD-10-CM

## 2024-06-13 DIAGNOSIS — M51.37 DDD (DEGENERATIVE DISC DISEASE), LUMBOSACRAL: ICD-10-CM

## 2024-06-13 DIAGNOSIS — M79.10 MYALGIA: Primary | ICD-10-CM

## 2024-06-13 DIAGNOSIS — Q74.1 CONGENITAL GENU VALGUS: ICD-10-CM

## 2024-06-13 PROCEDURE — 73721 MRI JNT OF LWR EXTRE W/O DYE: CPT | Performed by: ORTHOPAEDIC SURGERY

## 2024-06-13 PROCEDURE — 97162 PT EVAL MOD COMPLEX 30 MIN: CPT | Performed by: PHYSICAL THERAPIST

## 2024-06-13 PROCEDURE — 97110 THERAPEUTIC EXERCISES: CPT | Performed by: PHYSICAL THERAPIST

## 2024-06-13 NOTE — PATIENT INSTRUCTIONS
HEP: Patient was instructed in and issued a HEP for ALEXIA or REIL x 10 reps 4-6x/day.  Pronelying to NHAN x 2-3 mins 1-2x/day.  Posture correction with lumbar roll (recommended) with supportive chair.

## 2024-06-13 NOTE — PROGRESS NOTES
LUMBAR SPINE EVALUATION:   Referring Physician: Dr. 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  She denies any numbness or tingling at this time.  Zaida stays in an independent living home.  She does normal house chores/duties, and likes walking for 30 mins and being active in [employer] and singing in a choir.  She also enjoys reading sitting on a sofa or on a hard back chair.  She drives a car to do errands, go to activities, and appointments.      History of current condition: Zaida report that she has had lower back pain for 2 years.  Her symptoms would come and go but last March 2024 her symptoms worsened for no apparent reason.  She state that she is feeling worse symptoms in the back at this time.  She is now referred to physical therapy for evaluation and treatment.       Previous history:  Carpal tunnel (B) wrists; osteoporosis; HTN (medicated); Cholesterol (medicated), Hernia (R) inguinal surgery about 5 years ago, scoliosis.      Current functional limitations reported include the inability to bend down, sit for 5 mins, rise up from sitting, stand to do the dishes, walk, lay down on her sides, wake up in the morning,  and do house chores without producing or increasing symptoms in the lower back area.     Patient would like to be able to return to their prior level of function     ASSESSMENT:   Zaida is presenting with a (R) lumbar spine unilateral derangement with a directional preference toward extension.  Her symptoms in the lower back abolished after this directional preference exercise.  Explained to Zaida the mechanical production of her symptoms and the importance of doing her directional preference exercise consistently to have a good carryover of her improved symptoms.  Discussed the role of posture correction to augment his improved symptoms.  Zaida Osuna will be re-assessed next session and will be progressed or modified according to her presentation.  She understood and agreed with the plan of care.  All questions and concerns were answered and addressed at this time.        Zaida would benefit from skilled Physical Therapy to address the above impairments.      Precautions: None     OBJECTIVE:   Observation/Posture:   Slouched, forward head, protruded head.  Posture correction in sitting at edge bed with cueing to promote lumbar lordosis centralized symptoms to the lower back.    Gait:   Normal no assistive device.     ROM: (Pre-test symptom: 2/10 ache mid lower back)  Lumbar        Movement Loss Pain (+/-)   Flexion Minimal loss NE   Extension Major- Moderate loss Inc, NW   R Sideglide Minimal loss NE   L Sideglide Minimal loss NE     Repeated motion testing/other tests:  ALEXIA x 10 reps; Dec, B (increased ROM).  Pronelying x 1 min; Dec, B 1/10.  NHAN x 2 mins; Dec, A.  REIL x 5 reps; NE.  Sitting posture correction with lumbar roll on a supportive chair.     Today’s Treatment and Response:  Patient instructed and educated on lumbar mechanics, directional preference exercise, centralization of symptoms, goal setting with patient,  posture correction, and HEP.    HEP: Patient was instructed in and issued a HEP for ALEXIA or  REIL x 10 reps 4-6x/day.  Pronelying to NHAN x 2-3 mins 1-2x/day.  Posture correction with lumbar roll (recommended) with supportive chair.      Charges: PT Eval (Mod) x 1, TherEx x 1    Total Timed treatment: 20 min      Total Treatment Time: 48 min       PLAN OF CARE:    Goals: (12 visits)  1. Patient to consistently perform her HEP and it's progression to maintain her improved condition.   2. Patient to have reduced, centralized, and abolished symptoms in the low back to enable easier ADLs, functional, work, and recreational activities.   3. Patient to have WNL of lumbar mobility in all directions to be able to bend down, sit for 5 mins, rise up from sitting, stand to do the dishes, walk, lay down on her sides, wake up in the morning, and do house chores without producing or increasing symptoms in the lower back area.   4. Patient to consistently have good posture to promote her symptomfree condition.     Patient was advised of these findings, precautions, and treatment options and has agreed to actively participate in planning/goal setting for this course of care.    Frequency / Duration: Patient will be seen for 2-1x/week or a total of 12 visits over a 90 day period.      Treatment will include: Directional preference exercises; Therapeutic Exercises; Neuromuscular Re-education; Posture correction; Patient education; HEP progression.    Education or treatment limitation: None  Rehab Potential:good    In agreement with functional assessment testing score and clinical rationale, this evaluation involved Moderate Complexity decision making due to 3+ personal factors/comorbidities, 4+ body structures involved/activity limitations, and evolving symptoms including changing pain levels.    CAYDEN Score: Initial:  36    Patient was advised of these findings, precautions, and treatment options and has agreed to actively participate in planning and for this course of care.    Thank you for your referral. Please co-sign or  sign and return this letter via fax as soon as possible to [phone]. If you have any questions, please contact me at Dept: [phone]    Sincerely,  Electronically signed by therapist: YASMIN FARRAR PT Cert. MDT    Physician's certification required: Yes  I certify the need for these services furnished under this plan of treatment and while under my care.    X___________________________________________________ Date____________________    Certification From: 6/13/2024  To:9/11/2024

## 2024-06-14 ENCOUNTER — TELEPHONE (OUTPATIENT)
Dept: ORTHOPEDICS CLINIC | Facility: CLINIC | Age: 74
End: 2024-06-14

## 2024-06-14 ENCOUNTER — HOSPITAL ENCOUNTER (OUTPATIENT)
Dept: ULTRASOUND IMAGING | Facility: HOSPITAL | Age: 74
Discharge: HOME | End: 2024-06-14
Attending: ORTHOPAEDIC SURGERY

## 2024-06-14 DIAGNOSIS — M79.604 PAIN IN BOTH LOWER EXTREMITIES: ICD-10-CM

## 2024-06-14 DIAGNOSIS — M79.605 PAIN IN BOTH LOWER EXTREMITIES: ICD-10-CM

## 2024-06-14 PROCEDURE — 93970 EXTREMITY STUDY: CPT | Performed by: ORTHOPAEDIC SURGERY

## 2024-06-14 NOTE — TELEPHONE ENCOUNTER
Patient requesting to speak with  regarding questions for upcoming surgery. Please call at [phone],thanks.

## 2024-06-17 NOTE — TELEPHONE ENCOUNTER
Called and left a message for patient. We have surgery dates available and she had some questions regarding surgery. She can call us back at .

## 2024-06-19 ENCOUNTER — TELEPHONE (OUTPATIENT)
Dept: ORTHOPEDICS CLINIC | Facility: CLINIC | Age: 74
End: 2024-06-19

## 2024-06-19 DIAGNOSIS — M17.12 PRIMARY OSTEOARTHRITIS OF LEFT KNEE: Primary | ICD-10-CM

## 2024-06-19 NOTE — TELEPHONE ENCOUNTER
Type of surgery:  Left total knee arthroplasty with patient specific instruments  Date: 8/5/24  Location: [facility] OR 23 Hr hold  Medical Clearance:      *Medical:Yes      *Dental: Yes      *Other: Cardiac  Prior Authorization Status: Pending  Workers Comp:  Medacta/Jenae:Jenae  Perdido:yes  POV: 8/21/24

## 2024-06-19 NOTE — TELEPHONE ENCOUNTER
Patient is requesting to get surgery clearance form faxed to the Cardiologist Dr Dawood Oates - fax # [phone]. Patient also has questions for the nurse about the tests that he is supposed to have done.

## 2024-06-20 ENCOUNTER — OFFICE VISIT (OUTPATIENT)
Dept: PHYSICAL THERAPY | Age: 74
End: 2024-06-20
Attending: PHYSICAL MEDICINE & REHABILITATION

## 2024-06-20 PROCEDURE — 97110 THERAPEUTIC EXERCISES: CPT | Performed by: PHYSICAL THERAPIST

## 2024-06-20 NOTE — PROGRESS NOTES
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 MD Date of Evaluation:  6/13/2024    Precautions:  None Date POC Expires: 9/13/2024     Date of Surgery: NA    Next MD visit:   none scheduled     Today's Date: 6/20/2024    Insurance Primary/Secondary: MEDICARE PART A&B     Authorized Visits: 12     Visit Number: 2    Charges: TherEx x 3  Total Timed Treatment: 47 min  Total Treatment time: 49 min    Medication Changes since last visit?: No    Subjective:   Zaida report that she is feeling better in the back.  She definitely feels minimal to no pain and she can move her lower back more without producing symptoms.  She just feels tired today.  She has been doing her HEP (ALEXIA and pronelying to NHAN) about 4x/day 10 reps each time.   Objective:   ROM: (Pre-test symptom: 0/10 ache mid lower back)  Lumbar        Movement Loss Pain (+/-)   Flexion Nil loss NE   Extension Moderate-Minimal loss NE   R Sideglide Nil loss NE   L Sideglide Minimal loss NE     Treatment:    Date: 6/20/2024  Tx#: 2/12 Date:   Tx#: 3/ Date:   Tx#: 4/ Date:   Tx#: 5/ Date:   Tx#: 6/ Date:   Tx#: 7/ Date:   Tx#: 8/   Scifit UE only L3 fwd/bkwd x 10 mins total; NE     ALEXIA x 10 reps; NE    + lean back on rail x 10 reps; NE (good stretch)     Pronelying x 1 min; NE unload back     NHAN x 2 mins; NE    Sustained lumbar extension with mat table x 3+3  mins (higher angle); NE light stretch lower back    Prone: Alt LE lift x 10 reps; NE    Prone: (B) UE h.abduction x 10 reps x 10 cts ea; NE tired    Prone: back extension 10 reps x 10 cts ea; NE             Assessment:   Zaida continue to respond to lumbar extension directional preference exercise.  She has increased lumbar mobility in all directions without production of symptoms.  Progressed lumbar extension in standing with added overpressure using a rail/counter.  She was reviewed to do pronelying in extension (NHAN) prior to sleeping at night and reinforced posture correction using a lumbar roll. Initiated light prone postural strengthening exercises.  All questions and concerns were answered and addressed at this time.        HEP:     (12 visits)  1. Patient to consistently perform her HEP and it's progression to maintain her improved condition.   2. Patient to have reduced, centralized, and abolished symptoms in the low back to enable easier ADLs, functional, work, and recreational activities.   3. Patient to have WNL of lumbar mobility in all directions to be able to bend down, sit for 5 mins, rise up from sitting, stand to do the dishes, walk, lay down on her sides, wake up in the morning, and do house chores without producing or increasing symptoms in the lower back area.   4. Patient to consistently have good posture to promote her symptomfree condition.     Goals:    (12 visits)  1. Patient to consistently perform her HEP and it's progression to maintain her improved condition.   2. Patient to have reduced, centralized, and abolished symptoms in the low back to enable easier ADLs, functional, work, and recreational activities.   3. Patient to have WNL of lumbar mobility in all directions to be able to bend down, sit for 5 mins, rise up from sitting, stand to do the dishes, walk, lay down on her sides, wake up in the morning, and do house chores without producing or increasing symptoms in the lower back  area.   4. Patient to consistently have good posture to promote her symptomfree condition.       Plan:   Re-assess next session and continue with directional preference exercise, posture correction, postural strengthening, patient education, and HEP progression.

## 2024-06-26 ENCOUNTER — OFFICE VISIT (OUTPATIENT)
Dept: PHYSICAL THERAPY | Age: 74
End: 2024-06-26
Attending: PHYSICAL MEDICINE & REHABILITATION

## 2024-06-26 PROCEDURE — 97110 THERAPEUTIC EXERCISES: CPT | Performed by: PHYSICAL THERAPIST

## 2024-06-26 NOTE — PROGRESS NOTES
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 MD Date of Evaluation:  6/13/2024    Precautions:  None Date POC Expires: 9/13/2024     Date of Surgery: NA    Next MD visit:   none scheduled     Today's Date: 6/26/2024    Insurance Primary/Secondary: MEDICARE PART A&B     Authorized Visits: 12     Visit Number: 3    Charges: TherEx x 3  Total Timed Treatment: 49 min  Total Treatment time: 51 min    Medication Changes since last visit?: No    Subjective:   Zaida report that she feels some ache in the middle lower back rated 0-3/10.  She is also stressed out of all the activities she is doing today but it's winding now.  She was not able to find a rail or a counter to do her HEP (LAEXIA over a rail/counter) so she went back to doing ALEXIA only without the added overpressure.    Objective:   ROM: (Pre-test symptom: 0/10 ache mid lower back)  Lumbar        Movement Loss Pain (+/-)   Flexion Nil loss NE   Extension Minimal loss NE   R Sideglide Nil loss NE   L Sideglide Nil loss NE     Treatment:    Date: 6/20/2024  Tx#: 2/12 Date: 6/26/2024  Tx#: 3/12 Date:   Tx#: 4/ Date:   Tx#: 5/ Date:   Tx#: 6/ Date:   Tx#: 7/   Scifit UE only L3 fwd/bkwd x 10 mins total; NE     ALEXIA x 10 reps; NE    + lean back on rail x 10 reps; NE (good stretch)     Pronelying x 1 min; NE unload back     NHAN x 2 mins; NE    Sustained lumbar  extension with mat table x 3+3 mins (higher angle); NE light stretch lower back    Prone: Alt LE lift x 10 reps; NE    Prone: (B) UE h.abduction x 10 reps x 10 cts ea; NE tired    Prone: back extension 10 reps x 10 cts ea; NE   Scifit UE only L fwd/bkwd x 5 mins ea; NE     ALEXIA over rail for OP x 10 reps; NE    ALEXIA hands on post/wall x 10 reps; NE    Pronelying x 1 min; NE unload back     Sustained lumbar extension with mat table x 3+3 mins (higher angle); NE light stretch lower back    Prone: Alt LE lift 2 lbs 2 x 10 reps; NE    Prone: (B) UE extension 10 lb 10 reps x 10 cts ea; NE tired    Prone: (B) UE h.abduction x 10 reps x 10 cts ea; NE tired    Prone: back extension 10 reps x 10 cts ea; NE    Hydrant (R/L) LE 1 lb ball 5 reps x 10 cts ea; NE    ALEXIA over rail x 10 reps; NE         Assessment:   Zaida continue to respond to lumbar extension directional preference exercise.  She did not report any symptoms in the lower back during her session but she was tired.  She has increased lumbar mobility in all directions without pain/ache.  Zaida was instructed to perform lumbar extension in standing with hands on the wall (ALEXIA hands on wall) as an alternative exercise if she cannot find a rail or a counter to lean up against.  She was progressed with postural strengthening/stability exercises with cueing to correct exercise technique and posture.  Reinforced sitting posture correction using a lumbar roll.  She understood and agreed with her treatment plan.  All questions and concerns were answered at this time.         HEP:     (12 visits)  1. Patient to consistently perform her HEP and it's progression to maintain her improved condition.   2. Patient to have reduced, centralized, and abolished symptoms in the low back to enable easier ADLs, functional, work, and recreational activities.   3. Patient to have WNL of lumbar mobility in all directions to be able to bend down, sit for 5 mins, rise up from sitting, stand  to do the dishes, walk, lay down on her sides, wake up in the morning, and do house chores without producing or increasing symptoms in the lower back area.   4. Patient to consistently have good posture to promote her symptomfree condition.     Plan:   Re-assess next session and continue with directional preference exercise, posture correction, postural strengthening, patient education, and HEP progression.

## 2024-07-02 ENCOUNTER — HOSPITAL ENCOUNTER (OUTPATIENT)
Dept: MAMMOGRAPHY | Facility: HOSPITAL | Age: 74
Discharge: HOME | End: 2024-07-02
Attending: FAMILY MEDICINE
Payer: MEDICARE

## 2024-07-02 DIAGNOSIS — Z12.31 ENCOUNTER FOR SCREENING MAMMOGRAM FOR MALIGNANT NEOPLASM OF BREAST: ICD-10-CM

## 2024-07-02 PROCEDURE — 77063 BREAST TOMOSYNTHESIS BI: CPT | Performed by: FAMILY MEDICINE

## 2024-07-02 PROCEDURE — 77067 SCR MAMMO BI INCL CAD: CPT | Performed by: FAMILY MEDICINE

## 2024-07-03 ENCOUNTER — APPOINTMENT (OUTPATIENT)
Dept: PHYSICAL THERAPY | Age: 74
End: 2024-07-03
Attending: PHYSICAL MEDICINE & REHABILITATION
Payer: MEDICARE

## 2024-07-10 ENCOUNTER — OFFICE VISIT (OUTPATIENT)
Dept: PHYSICAL THERAPY | Age: 74
End: 2024-07-10
Attending: PHYSICAL MEDICINE & REHABILITATION
Payer: MEDICARE

## 2024-07-10 PROCEDURE — 97110 THERAPEUTIC EXERCISES: CPT | Performed by: PHYSICAL THERAPIST

## 2024-07-10 NOTE — PROGRESS NOTES
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 MD Date of Evaluation:  6/13/2024    Precautions:  None Date POC Expires: 9/13/2024     Date of Surgery: NA    Next MD visit:   none scheduled     Today's Date: 7/11/2024    Insurance Primary/Secondary: MEDICARE PART A&B     Authorized Visits: 12     Visit Number: 4    Charges: TherEx x 3  Total Timed Treatment: 46 min  Total Treatment time: 48 min    Medication Changes since last visit?: No    Subjective:   aZida report that her back is feeling fine.  There is no pain but she feels tired.  She has been trying to do her HEP (ALEXIA) as regularly as she can. She is also more aware of her posture in sitting and have been trying to correct is using a lumbar roll.     Objective:   ROM: (Pre-test symptom: 0/10 ache mid lower back)  Lumbar        Movement Loss Pain (+/-)   Flexion Nil loss NE   Extension Nil loss NE   R Sideglide Nil loss NE   L Sideglide Nil loss NE     Treatment:    Date: 6/20/2024  Tx#: 2/12 Date: 6/26/2024  Tx#: 3/12 Date: 7/11/2024  Tx#: 4/12 Date:   Tx#: 5/ Date:   Tx#: 6/ Date:   Tx#: 7/   Scifit UE only L3 fwd/bkwd x 10 mins total; NE     ALEXIA x 10 reps; NE    + lean back on rail x 10 reps; NE (good stretch)     Pronelying x 1 min; NE unload back     NHAN x 2 mins; NE    Sustained lumbar extension with mat table x 3+3 mins (higher angle); NE light  stretch lower back    Prone: Alt LE lift x 10 reps; NE    Prone: (B) UE h.abduction x 10 reps x 10 cts ea; NE tired    Prone: back extension 10 reps x 10 cts ea; NE   Scifit UE only L fwd/bkwd x 5 mins ea; NE     ALEXIA over rail for OP x 10 reps; NE    ALEXIA hands on post/wall x 10 reps; NE    Pronelying x 1 min; NE unload back     Sustained lumbar extension with mat table x 3+3 mins (higher angle); NE light stretch lower back    Prone: Alt LE lift 2 lbs 2 x 10 reps; NE    Prone: (B) UE extension 10 reps x 10 cts ea; NE tired    Prone: (B) UE h.abduction x 10 reps x 10 cts ea; NE tired    Prone: back extension 10 reps x 10 cts ea; NE    Hydrant (R/L) LE 1 lb ball 5 reps x 10 cts ea; NE    ALEXIA over rail x 10 reps; NE Scifit UE only L3 fwd/bkwd x 5 mins ea; NE     ALEXIA over rail for OP x 10 reps; NE    Pronelying x 1 min; NE unload back    Prone: Alt LE lift 2 lbs 2 x 10 reps; NE    Prone: (B) UE extension 1 lb 10 reps x 10 cts ea; NE tired    Prone: (B) UE h.abduction x 10 reps x 10 cts ea; NE tired    Prone: back extension 10 reps x 10 cts ea; NE    (B) UE n.row, extension, w.row greenTB 10 reps x 10 cts ea; NE tired    ALEXIA hands on wall x 10 reps; NE        Assessment:   Zaida continue to respond to lumbar extension directional preference exercise.  She has WNL of lumbar AROM in all directions with improved standing posture.  She is progressing with postural strengthening/stability exercises with cueing to correct form and alignment.  All questions and concerns were answered at this time.         HEP:     (12 visits)  1. Patient to consistently perform her HEP and it's progression to maintain her improved condition.   2. Patient to have reduced, centralized, and abolished symptoms in the low back to enable easier ADLs, functional, work, and recreational activities.   3. Patient to have WNL of lumbar mobility in all directions to be able to bend down, sit for 5 mins, rise up from sitting, stand to do the dishes,  walk, lay down on her sides, wake up in the morning, and do house chores without producing or increasing symptoms in the lower back area.   4. Patient to consistently have good posture to promote her symptomfree condition.     Plan:   Re-assess next session and continue with directional preference exercise, posture correction, postural strengthening, patient education, and HEP progression.

## 2024-07-17 ENCOUNTER — OFFICE VISIT (OUTPATIENT)
Dept: PHYSICAL THERAPY | Age: 74
End: 2024-07-17
Attending: PHYSICAL MEDICINE & REHABILITATION
Payer: MEDICARE

## 2024-07-17 PROCEDURE — 97110 THERAPEUTIC EXERCISES: CPT

## 2024-07-17 NOTE — PROGRESS NOTES
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 MD Date of Evaluation:  6/13/2024    Precautions:  None Date POC Expires: 9/13/2024     Date of Surgery: NA    Next MD visit:   none scheduled     Today's Date: 7/17/2024    Insurance Primary/Secondary: MEDICARE PART A&B     Authorized Visits: 12     Visit Number: 5    Charges: TherEx x 3  Total Timed Treatment: 44 min  Total Treatment time: 45 min    Medication Changes since last visit?: No    Subjective:   Zaida report that she feels her low back is \"feeling better and better\" as she implements her exercises more often throughout the day.  There is no pain but she feels tired and \"upset\" on this day due to certain circumstances at home.  She has been trying to do her HEP (ALEXIA) as regularly as she can. She is also more aware of her posture in sitting and have been trying to correct is using a lumbar roll.     Objective:   ROM: (Pre-test symptom: 0/10 ache mid lower back)  Lumbar        Movement Loss Pain (+/-)   Flexion Nil loss NE   Extension Nil loss NE   R Sideglide Nil loss NE   L Sideglide Nil loss NE     Treatment:    Date: 6/20/2024  Tx#: 2/12 Date: 6/26/2024  Tx#: 3/12 Date: 7/11/2024  Tx#: 4/12 Date: 7/17/2024  Tx#: 5/12 Date:   Tx#: 6/ Date:   Tx#: 7/   Scifit UE only L3 fwd/bkwd x 10 mins total; NE     ALEXIA x 10 reps; NE    + lean back on rail x 10 reps;  NE (good stretch)     Pronelying x 1 min; NE unload back     NHAN x 2 mins; NE    Sustained lumbar extension with mat table x 3+3 mins (higher angle); NE light stretch lower back    Prone: Alt LE lift x 10 reps; NE    Prone: (B) UE h.abduction x 10 reps x 10 cts ea; NE tired    Prone: back extension 10 reps x 10 cts ea; NE   Scifit UE only L fwd/bkwd x 5 mins ea; NE     ALEXIA over rail for OP x 10 reps; NE    ALEXIA hands on post/wall x 10 reps; NE    Pronelying x 1 min; NE unload back     Sustained lumbar extension with mat table x 3+3 mins (higher angle); NE light stretch lower back    Prone: Alt LE lift 2 lbs 2 x 10 reps; NE    Prone: (B) UE extension 10 reps x 10 cts ea; NE tired    Prone: (B) UE h.abduction x 10 reps x 10 cts ea; NE tired    Prone: back extension 10 reps x 10 cts ea; NE    Hydrant (R/L) LE 1 lb ball 5 reps x 10 cts ea; NE    ALEXIA over rail x 10 reps; NE Scifit UE only L3 fwd/bkwd x 5 mins ea; NE     ALEXIA over rail for OP x 10 reps; NE    Pronelying x 1 min; NE unload back    Prone: Alt LE lift 2 lbs 2 x 10 reps; NE    Prone: (B) UE extension 1 lb 10 reps x 10 cts ea; NE tired    Prone: (B) UE h.abduction x 10 reps x 10 cts ea; NE tired    Prone: back extension 10 reps x 10 cts ea; NE    (B) UE n.row, extension, w.row greenTB 10 reps x 10 cts ea; NE tired    ALEXIA hands on wall x 10 reps; NE Scifit UE only L3 fwd/bkwd x 5 mins ea; NE     ALEXIA over rail for OP x 10 reps; NE    Pronelying x 1 min; NE unload back    Prone: Alt LE lifts with 2 lbs. 10 reps x 5 cts; NE    Prone: (B) UE extension 1 lb 10 reps x 10 cts ea; NE    Prone: (B) UE h.abduction with 1 lb 10 reps x 10 cts ea; NE    Prone: back extension 10 reps x 10 cts ea; NE    Supine Bridges. 10 reps x 10 cts; P, NW (Cramping in (R) hamstring)      ALEXIA hands on wall x 10 reps; NE       Assessment:   Zaida continue to respond to lumbar extension directional preference exercise.  She has WNL of lumbar AROM in all directions with improved standing  posture.  She is progressing with postural strengthening/stability exercises with cueing to correct form and alignment and demonstrates improvements in her muscular endurance.  Cueing provided to assist/ensure pt maintains good technique/posture.  All questions and concerns were answered at this time.         HEP:     (12 visits)  1. Patient to consistently perform her HEP and it's progression to maintain her improved condition.   2. Patient to have reduced, centralized, and abolished symptoms in the low back to enable easier ADLs, functional, work, and recreational activities.   3. Patient to have WNL of lumbar mobility in all directions to be able to bend down, sit for 5 mins, rise up from sitting, stand to do the dishes, walk, lay down on her sides, wake up in the morning, and do house chores without producing or increasing symptoms in the lower back area.   4. Patient to consistently have good posture to promote her symptomfree condition.       Plan:   Re-assess next session and continue with directional preference exercise, posture correction, postural strengthening, patient education, and HEP progression.     On this date patient was seen by Kirk Smith PTA under the supervision of Mohamud Kee PT. Cert MDT.

## 2024-07-24 ENCOUNTER — OFFICE VISIT (OUTPATIENT)
Dept: PHYSICAL THERAPY | Age: 74
End: 2024-07-24
Attending: PHYSICAL MEDICINE & REHABILITATION
Payer: MEDICARE

## 2024-07-24 PROCEDURE — 97110 THERAPEUTIC EXERCISES: CPT

## 2024-07-24 NOTE — PROGRESS NOTES
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 MD Date of Evaluation:  6/13/2024    Precautions:  None Date POC Expires: 9/13/2024     Date of Surgery: NA    Next MD visit:   none scheduled     Today's Date: 7/24/2024    Insurance Primary/Secondary: MEDICARE PART A&B     Authorized Visits: 12     Visit Number: 6    Charges: TherEx x 3  Total Timed Treatment: 44 min  Total Treatment time: 45 min    Medication Changes since last visit?: No    Subjective:   Zaida continues to report feeling \"really good\" in the lower back and overall cites feeling stressed due to matters she is trying to sort in her household.  She says that she will be going for (L) knee replacement Sx on Aug 23rd. She says that she is not using her lumbar roll as often as needed and was reminded the importance of doing so to augment her sitting posture. Overall she reports being compliant to her HEP regularly (Lumbar extension at counter).    Objective:   ROM: (Pre-test symptom: 0/10 ache mid lower back)  Lumbar        Movement Loss Pain (+/-)   Flexion Nil loss NE   Extension Nil loss NE   R Sideglide Nil loss NE   L Sideglide Nil loss P, NW (pull in R ache, 4/10)     Treatment:    Date: 6/20/2024  Tx#: 2/12 Date: 6/26/2024  Tx#: 3/12 Date: 7/11/2024  Tx#: 4/12 Date: 7/17/2024  Tx#: 5/12 Date: 7/24/2024  Tx#: 6/12 Date:   Tx#: 7/   Damaris GRAY only  L3 fwd/bkwd x 10 mins total; NE     ALEXIA x 10 reps; NE    + lean back on rail x 10 reps; NE (good stretch)     Pronelying x 1 min; NE unload back     NHAN x 2 mins; NE    Sustained lumbar extension with mat table x 3+3 mins (higher angle); NE light stretch lower back    Prone: Alt LE lift x 10 reps; NE    Prone: (B) UE h.abduction x 10 reps x 10 cts ea; NE tired    Prone: back extension 10 reps x 10 cts ea; NE   Scifit UE only L fwd/bkwd x 5 mins ea; NE     ALEXIA over rail for OP x 10 reps; NE    ALEXIA hands on post/wall x 10 reps; NE    Pronelying x 1 min; NE unload back     Sustained lumbar extension with mat table x 3+3 mins (higher angle); NE light stretch lower back    Prone: Alt LE lift 2 lbs 2 x 10 reps; NE    Prone: (B) UE extension 10 reps x 10 cts ea; NE tired    Prone: (B) UE h.abduction x 10 reps x 10 cts ea; NE tired    Prone: back extension 10 reps x 10 cts ea; NE    Hydrant (R/L) LE 1 lb ball 5 reps x 10 cts ea; NE    ALEXIA over rail x 10 reps; NE Scifit UE only L3 fwd/bkwd x 5 mins ea; NE     ALEXIA over rail for OP x 10 reps; NE    Pronelying x 1 min; NE unload back    Prone: Alt LE lift 2 lbs 2 x 10 reps; NE    Prone: (B) UE extension 1 lb 10 reps x 10 cts ea; NE tired    Prone: (B) UE h.abduction x 10 reps x 10 cts ea; NE tired    Prone: back extension 10 reps x 10 cts ea; NE    (B) UE n.row, extension, w.row greenTB 10 reps x 10 cts ea; NE tired    ALEXIA hands on wall x 10 reps; NE Scifit UE only L3 fwd/bkwd x 5 mins ea; NE     ALEXIA over rail for OP x 10 reps; NE.    Pronelying x 1 min; NE unload back    Prone: Alt LE lifts with 2 lbs. 10 reps x 5 cts; NE    Prone: (B) UE extension 1 lb 10 reps x 10 cts ea; NE    Prone: (B) UE h.abduction with 1 lb 10 reps x 10 cts ea; NE    Prone: back extension 10 reps x 10 cts ea; NE    Supine Bridges. 10 reps x 10 cts; P, NW (Cramping in (R) hamstring)      ALEXIA hands on wall x 10 reps; NE NuStep, UE only, Lv0-1 x 10 min; NE.    ALEXIA over rail for OP x 10 reps;  NE.    - Dec, A.    Prone: Alt LE lifts with 1 lbs. 10 reps x 5 cts; NE    Prone: Thoracic Ext with (B) UE Ext. 10 reps x 10 cts; NE, tired.    Supine Bridges with resist hip abd using GTB. 10 reps x 10 cts; NE (feet closer to buttocks to prevent hamstring cramping)    Standing: (B) UE Ext with RTB. 10 reps x 10 cts; NE    Standing: W. Rows with RTB. 10 reps x 10 cts; NE.    Standing: N. Rows with RTB. 10 reps x 10 cts; NE.    ALEXIA hands on wall x 10 reps; NE      Assessment:   Zaida continues to respond to lumbar extension directional preference exercise as she demonstrated an abolishment of symptoms during (L) SGIS; She maintains WNL of lumbar AROM and demonstrates improvements in her functional endurance.  She was introduced to standing postural strengthening exercises using the RTB and was prescribed these exercises with handouts/instruction and told to continue at home. Cueing was provided to assist/ensure pt maintains good technique/posture.  All questions and concerns were answered at this time.         HEP:    - ALEXIA over counter/table. 10 reps x 2 cts; 3-5x daily.  - N. Rows, W. Rows, (B) UE Ext with RTB. 10 reps x 10 cts, 1-2x daily.      Goals: (12 visits)  1. Patient to consistently perform her HEP and it's progression to maintain her improved condition.   2. Patient to have reduced, centralized, and abolished symptoms in the low back to enable easier ADLs, functional, work, and recreational activities.   3. Patient to have WNL of lumbar mobility in all directions to be able to bend down, sit for 5 mins, rise up from sitting, stand to do the dishes, walk, lay down on her sides, wake up in the morning, and do house chores without producing or increasing symptoms in the lower back area.   4. Patient to consistently have good posture to promote her symptomfree condition.       Plan:   Re-assess next session and continue with directional preference exercise, posture correction, postural strengthening, patient  education, and HEP progression.     On this date patient was seen by Kirk Smith PTA under the supervision of Mohamud Kee PT. Cert MDT.

## 2024-07-29 ENCOUNTER — TELEPHONE (OUTPATIENT)
Dept: ORTHOPEDICS CLINIC | Facility: CLINIC | Age: 74
End: 2024-07-29

## 2024-07-29 NOTE — TELEPHONE ENCOUNTER
Patient calling to ask if an order for a MRSA test can be placed for them as they are on their way to the lab now to have it done as it is needed for their procedure. Tried to contact a nurse. No answer. Please advise.

## 2024-07-29 NOTE — TELEPHONE ENCOUNTER
Patient stopped by the  to ask about MRSA test. Patient was given pre surgical papers today in clinic. Including Dental clearance form, [facility] Surgery Check List (Medical clearance), Pre Operative Education for Total Joint replacements and Medacta/Jenae booklet.

## 2024-07-30 ENCOUNTER — TELEPHONE (OUTPATIENT)
Dept: PHYSICAL THERAPY | Facility: HOSPITAL | Age: 74
End: 2024-07-30

## 2024-07-31 ENCOUNTER — APPOINTMENT (OUTPATIENT)
Dept: PHYSICAL THERAPY | Age: 74
End: 2024-07-31
Attending: PHYSICAL MEDICINE & REHABILITATION
Payer: MEDICARE

## 2024-07-31 ENCOUNTER — HOSPITAL ENCOUNTER (OUTPATIENT)
Dept: GENERAL RADIOLOGY | Facility: HOSPITAL | Age: 74
Discharge: HOME | End: 2024-07-31
Attending: ORTHOPAEDIC SURGERY
Payer: MEDICARE

## 2024-07-31 ENCOUNTER — OFFICE VISIT (OUTPATIENT)
Dept: ORTHOPEDICS CLINIC | Facility: CLINIC | Age: 74
End: 2024-07-31

## 2024-07-31 DIAGNOSIS — M25.531 BILATERAL WRIST PAIN: ICD-10-CM

## 2024-07-31 DIAGNOSIS — M25.532 BILATERAL WRIST PAIN: ICD-10-CM

## 2024-07-31 DIAGNOSIS — G56.03 BILATERAL CARPAL TUNNEL SYNDROME: Primary | ICD-10-CM

## 2024-07-31 PROCEDURE — 73110 X-RAY EXAM OF WRIST: CPT | Performed by: ORTHOPAEDIC SURGERY

## 2024-07-31 PROCEDURE — 99214 OFFICE O/P EST MOD 30 MIN: CPT | Performed by: ORTHOPAEDIC SURGERY

## 2024-07-31 NOTE — PROGRESS NOTES
NURSING INTAKE COMMENTS:   Chief Complaint   Patient presents with    Wrist Pain     Patient is here for carpal tunnel in both wrist. She rates her pain 6/10. She complains of numbness. Patient states that she is not able to put on earrings.          HPI: This 73 year old female presents today with complaints of bilateral hand numbness.  She has had progressive numbness in both hands over the last 6 months.  No recent injury.  She does have some neck pain.  She feels discomfort when leaning forward and flexing her neck.  She feels the numbness in the hands slightly worsens with this maneuver.  Brushing her teeth can be a problem.  She recently had electrodiagnostic testing which showed mild carpal tunnel syndrome bilaterally.  She has been using night splints with minimal improvement.  She does not take any type of oral anti-inflammatory medication.  She has a history of hematuria.    Past Medical History:    Essential hypertension    High cholesterol     Past Surgical History:   Procedure Laterality Date    Cataract Left 01/21/2018    Cataract Right 07/31/2018    Hernia surgery  07/11/2016    Hysterectomy  09/08/2020    Total abdom hysterectomy       Current Outpatient Medications   Medication Sig Dispense Refill    fluvoxaMINE 50 MG Oral Tab Take 1 tablet (50 mg total) by mouth nightly. 90 tablet 0    albuterol 108 (90 Base) MCG/ACT Inhalation Aero Soln Inhale 2 puffs into the lungs every 4 (four) hours as needed for Wheezing or Shortness of Breath (cough). 1 each 3    Polyethylene Glycol 3350 (MIRALAX OR) Take by mouth.      acetaminophen 500 MG Oral Tab Take 1 tablet (500 mg total) by mouth daily as needed.      aspirin (ASPIRIN 81) 81 MG Oral Tab EC Take 1 tablet (81 mg total) by mouth daily. 365 tablet 3    FLUTICASONE PROPIONATE 50 MCG/ACT Nasal Suspension SHAKE LIQUID AND USE 2 SPRAYS IN EACH NOSTRIL EVERY DAY AS DIRECTED (Patient taking differently: 2 sprays by Nasal route daily.) 48 g 0    simvastatin 40  MG Oral Tab Take 1 tablet (40 mg total) by mouth nightly. FOR CHOLESTEROL. 90 tablet 3    Cholecalciferol 50 MCG (2000 UT) Oral Cap once daily       Allergies   Allergen Reactions    Antispasmodic NAUSEA AND VOMITING     pt unsure of reaction    Chlorthalidone DIZZINESS    Belladonna Alk-Phenobarbital UNKNOWN     pt unsure of reaction    Latex RASH     Family History   Problem Relation Age of Onset    Breast Cancer Sister         61-69    Breast Cancer Sister 40    Other (Parkinson's disease) Father     Seizure Disorder Daughter     Ovarian Cancer Neg        Social History     Occupational History    Not on file   Tobacco Use    Smoking status: Never     Passive exposure: Yes    Smokeless tobacco: Never   Vaping Use    Vaping status: Never Used   Substance and Sexual Activity    Alcohol use: Never    Drug use: Never    Sexual activity: Not on file        Review of Systems:  GENERAL: denies fevers, chills, night sweats, fatigue, unintentional weight loss/gain  SKIN: denies skin lesions, open sores, rash  HEENT:denies recent vision change, new nasal congestion,hearing loss, tinnitus, sore throat, headaches  RESPIRATORY: denies new shortness of breath, cough, asthma, wheezing  CARDIOVASCULAR: denies chest pain, leg cramps with exertion, palpitations, leg swelling  GI: denies abdominal pain, nausea, vomiting, diarrhea, constipation, hematochezia, worsening heartburn or stomach ulcers  : denies dysuria, hematuria, incontinence, increased frequency, urgency, difficulty urinating  MUSCULOSKELETAL: denies musculoskeletal complaints other than in HPI  NEURO: denies numbness, tingling, weakness, balance issues, dizziness, memory loss  PSYCHIATRIC: denies Hx of depression, anxiety, other psychiatric disorders  HEMATOLOGIC: denies blood clots, anemia, blood clotting disorders, blood transfusion  ENDOCRINE: denies autoimmune disease, thyroid issues, or diabetes  ALLERGY: denies asthma, seasonal allergies    Physical  Examination:    There were no vitals taken for this visit.  Constitutional: appears well hydrated, alert and responsive, no acute distress noted  Extremities: Bilateral hands with mild thenar atrophy.  Median nerve compression test and Phalen sign is positive bilaterally.  She has diminished pinprick sensation over the volar aspect of the thumb index and middle finger on the left more so than the right.  Spurling sign does produce some radiating pain into the shoulders bilaterally  Neurological: See above    Imaging:   DAREN DAPHNEY 2D+3D SCRN BILAT SELF-REQUEST(HAS PCP)(CPT=77067/13647)    Result Date: 7/5/2024  PROCEDURE: DAREN DAPHNEY 2D+3D SCRN BILAT SELF-REQUEST(HAS PCP)(CPT=77067/53027)  COMPARISON: [facility], [facility] DAPHNEY 2D+3D SCREENING BILAT ([id number]), 5/02/2022, 9:00 AM.  [facility], DAREN DAPHNEY 2D+3D SCREENING BILAT ([id number]), 3/10/2021, 2:51 PM.  External Exams, DAREN SCREENING BILAT (CPT=77067), 10/18/2018, 10:26 AM.  External Exams, DAREN SCREENING BILAT (CPT=77067), 8/25/2017, 10:49 AM.  External Exams, DAREN DIAGNOSTIC BILATERAL CPT=77066), 4/21/2016, 9:52 AM.  External Exams, DAREN SCREENING BILAT (CPT=77067), 3/16/2015, 12:36 PM.  [facility], DAREN DAPHNEY 2D+3D SCREENING BILAT ([id number]), 6/06/2023, 11:54 AM.  INDICATIONS: Z12.31 Encounter for screening mammogram for malignant neoplasm of breast  NOTE:   This patient identified you as their physician.  If this is not correct, please contact the Radiology Administrative Office at [phone].  For your convenience, the patient's name, address and phone number is as follows:   ULISES RASHID, [address], [place] [address], [phone]       BREAST COMPOSITION:   Category b-Scattered areas fibroglandular density.   TECHNIQUE:   Full field direct screening mammography was performed and images were reviewed with the Eurocept WIL 1.5.1.5 CAD device.  3D tomosynthesis was performed  and reviewed.    FINDINGS: Vascular calcifications are present bilaterally.  The parenchyma pattern is stable with no new suspicious asymmetry, mass, architectural distortion, or microcalcifications identified in either breast.   CONCLUSION:  Benign findings.  No mammographic evidence for breast malignancy.  As long as the patient's clinical breast exam remains unchanged, annual screening mammogram is recommended.  BI-RADS CATEGORY:   DIAGNOSTIC CATEGORY 2--BENIGN FINDING:   RECOMMENDATIONS:  ROUTINE MAMMOGRAM AND CLINICAL EVALUATION IN 12 MONTHS.   Your patient's answers to the health and family history questions collected during this mammogram indicate a potentially increased risk for breast cancer. It is recommended that this patient be evaluated in our Cancer Risk Assessment Clinic to determine eligibility for additional breast cancer screening, risk reduction strategies and/or genetic testing. Providers are encouraged to contact our breast navigator, Krystle Ledbetter, at [phone] with any questions or for guidance regarding this recommendation.   PLEASE NOTE: NORMAL MAMMOGRAM DOES NOT EXCLUDE THE POSSIBILITY OF BREAST CANCER.  A CLINICALLY SUSPICIOUS PALPABLE LUMP SHOULD BE BIOPSIED.   For patients over the age of 40, the target due date for the patient's next mammogram has been entered into a reminder system.   Patient received a discharge summary from the technologist after completion of exam.  Breast marker legend used on images  Triangle = Palpable lump [place] = Skin tag or mole BB = Nipple Linear molina = Scar Square = Pain   Finalized by (CST): Harleen Hernandez MD on 7/05/2024 at 7:52 AM             Labs:  Lab Results   Component Value Date    WBC 3.9 (L) 12/19/2022    HGB 13.3 12/19/2022    .0 12/19/2022      Lab Results   Component Value Date    GLU 92 06/06/2023    BUN 11 06/06/2023    CREATSERUM 0.69 06/06/2023    GFRNAA 78 01/14/2022    GFRAA 90 01/14/2022        Assessment and  Plan:  Diagnoses and all orders for this visit:    Bilateral carpal tunnel syndrome    Bilateral wrist pain  -     Cancel: XR [facility]RIST [facility]INIC (2 VIEWS) KEVIN (CPT=73100-50); Future        Assessment: Bilateral hand numbness, findings consistent with bilateral carpal tunnel syndrome    Plan: I would like to obtain a hard copy report on her recent electrodiagnostic testing.  I suggested that surgical treatment of her wrists may be helpful in the future.  I would advise focusing on her knee treatment prior to any type of wrist or hand surgery.  She will continue with the night splinting for now.  Follow-up with me for this problem again as needed.    Follow Up: Return if symptoms worsen or fail to improve.    YANIV GUERRERO MD

## 2024-08-07 ENCOUNTER — APPOINTMENT (OUTPATIENT)
Dept: PHYSICAL THERAPY | Age: 74
End: 2024-08-07
Attending: PHYSICAL MEDICINE & REHABILITATION
Payer: MEDICARE

## 2024-08-08 ENCOUNTER — APPOINTMENT (OUTPATIENT)
Dept: PHYSICAL THERAPY | Age: 74
End: 2024-08-08
Attending: PHYSICAL MEDICINE & REHABILITATION
Payer: MEDICARE

## 2024-08-16 ENCOUNTER — LAB ENCOUNTER (OUTPATIENT)
Dept: LAB | Facility: HOSPITAL | Age: 74
End: 2024-08-16
Attending: ORTHOPAEDIC SURGERY
Payer: MEDICARE

## 2024-08-16 DIAGNOSIS — Z01.818 PREOP TESTING: ICD-10-CM

## 2024-08-16 LAB
ALBUMIN SERPL-MCNC: 4.4 G/DL (ref 3.2–4.8)
ALP LIVER SERPL-CCNC: 68 U/L
ALT SERPL-CCNC: 8 U/L
AST SERPL-CCNC: 16 U/L (ref ?–34)
BILIRUB DIRECT SERPL-MCNC: 0.2 MG/DL (ref ?–0.3)
BILIRUB SERPL-MCNC: 0.5 MG/DL (ref 0.2–1.1)
BILIRUB UR QL: NEGATIVE
CLARITY UR: CLEAR
GLUCOSE UR-MCNC: NORMAL MG/DL
KETONES UR-MCNC: NEGATIVE MG/DL
LEUKOCYTE ESTERASE UR QL STRIP.AUTO: NEGATIVE
NITRITE UR QL STRIP.AUTO: NEGATIVE
PH UR: 6.5 [PH] (ref 5–8)
PROT SERPL-MCNC: 6.8 G/DL (ref 5.7–8.2)
PROT UR-MCNC: NEGATIVE MG/DL
RBC #/AREA URNS AUTO: >10 /HPF
SP GR UR STRIP: 1.02 (ref 1–1.03)
UROBILINOGEN UR STRIP-ACNC: 3

## 2024-08-16 PROCEDURE — 80076 HEPATIC FUNCTION PANEL: CPT

## 2024-08-16 PROCEDURE — 36415 COLL VENOUS BLD VENIPUNCTURE: CPT

## 2024-08-16 PROCEDURE — 81001 URINALYSIS AUTO W/SCOPE: CPT

## 2024-08-16 RX ORDER — ATORVASTATIN CALCIUM 20 MG/1
20 TABLET, FILM COATED ORAL DAILY
COMMUNITY
Start: 2023-11-17

## 2024-08-16 RX ORDER — AMLODIPINE BESYLATE 2.5 MG/1
2.5 TABLET ORAL DAILY
COMMUNITY
Start: 2023-11-17

## 2024-08-21 NOTE — H&P
[facility]  part of [facility]    History & Physical    Zaida Osuna Patient Status:  Outpatient in a Bed    1950 MRN [id number]   Location [facility] OPERATING ROOM Attending Gilberto Lees MD   Hosp Day # 0 PCP KEMAR AJY MD     Date:  2024  Date of Admission:  (Not on file)    History provided by:patient  Chief Complaint:   No chief complaint on file.    Left knee pain   HPI:   Zaida Osuna is a(n) 73 year old female. Presents today with complaints of bilateral knee pain and deformities left worse than right.  She has developed progressive valgus deformity of her left knee.  She has moderate pain.  She has difficulty walking distances due to the problem.  She has some discomfort and difficulty going up and down the stairs due to knee instability and deformity.  She has a history of osteoporosis.  She lives at [facility].  She takes Tylenol intermittently for the discomfort.  No recent injury to the knee.  No significant hip or groin pain.     History     Past Medical History:    Anxiety state    Back problem    neck pain- pressure and tightness    Essential hypertension    High blood pressure    High cholesterol    Hx of motion sickness    IBS (irritable bowel syndrome)    PONV (postoperative nausea and vomiting)    lasts for hours     Past Surgical History:   Procedure Laterality Date    Cataract Left 2018    Cataract Right 2018    Hernia surgery  2016    Hysterectomy  2020    Total abdom hysterectomy       Family History   Problem Relation Age of Onset    Breast Cancer Sister         61-69    Breast Cancer Sister 40    Other (Parkinson's disease) Father     Seizure Disorder Daughter     Ovarian Cancer Neg      Social History:  Social History     Socioeconomic History    Marital status: Single   Tobacco Use    Smoking status: Never     Passive exposure: Yes    Smokeless tobacco: Never   Vaping Use    Vaping status: Never Used   Substance and  Sexual Activity    Alcohol use: Never    Drug use: Never     Social Determinants of Health      Received from [facility]     Allergies/Medications:   Allergies:   Allergies   Allergen Reactions    Antispasmodic NAUSEA AND VOMITING     pt unsure of reaction    Chlorthalidone DIZZINESS    Belladonna Alk-Phenobarbital UNKNOWN     pt unsure of reaction    Latex RASH     No medications prior to admission.       Review of Systems:   Pertinent items are noted in HPI.    Physical Exam:   Vital Signs:  Height 5' (1.524 m), weight 122 lb (55.3 kg), not currently breastfeeding.     General appearance: alert, appears stated age and cooperative  Extremities: Severe valgus deformity left knee measuring approximately 30 or 40 degrees. This is partially passively correctable. There is a trace effusion in the left knee. 1+ effusion right knee. Right knee also has a smaller valgus deformity. Good varus valgus stability at 30 degrees and full extension. Lachman sign is equivocal. Posterior drawer negative. Knee range of motion is from 0 to 120 degrees bilaterally. No obvious flexion contracture appreciated. No calf swelling but there is mild tenderness along the medial aspect of the right calf and mid aspect of the left calf.   Pulses: 2+ and symmetric  Neurologic: Alert and oriented X 3, normal strength and tone. Normal symmetric reflexes. Normal coordination and gait. Light touch and pinprick sensation intact throughout the lower extremities. Ankle dorsiflexion plantarflexion EHL knee extension and hip flexion strength are 5 out of 5 bilaterally. No clonus.     Cervical Papanicolaou to be done in MD's office    Results:     Lab Results   Component Value Date    WBC 3.9 (L) 12/19/2022    HGB 13.3 12/19/2022    HCT 41.4 12/19/2022    .0 12/19/2022    CREATSERUM 0.69 06/06/2023    BUN 11 06/06/2023     06/06/2023    K 4.4 06/06/2023     06/06/2023    CO2 28.0 06/06/2023    GLU 92 06/06/2023    CA 9.6  06/06/2023    ALB 4.4 08/16/2024    ALKPHO 68 08/16/2024    BILT 0.5 08/16/2024    TP 6.8 08/16/2024    AST 16 08/16/2024    ALT 8 (L) 08/16/2024    TSH 0.868 05/23/2023     03/12/2021    DDIMER 1.03 (H) 11/01/2020    MG 2.0 01/14/2022    TROP <0.045 11/01/2020    B12 989 (H) 04/19/2021       No results found.        Assessment/Plan:     * No active hospital problems. *    Assessment: Bilateral knee osteoarthritis, primary, severe.  Significant valgus deformity bilateral knees.     Plan: I discussed operative and nonoperative treatment options.  She would like to proceed with surgical treatment of her left knee given the progressive deformity.  Advised venous ultrasound of both lower legs given her calf pain today.  This would be prior to surgery.  I ordered MRI imaging of the left hip knee and ankle for preoperative computer navigation patient specific instrumentation using the Jenae protocol.  Advised mental and dental evaluation prior to surgery for restratification.  Discussed risks and benefits of surgery.  Questions were answered.  No guarantees were made.  Follow-up again on the date of the procedure.     Follow Up: Return for follow-up visit two weeks after surgery.    Ladonna Bartlett PA-C  8/21/2024

## 2024-08-23 ENCOUNTER — APPOINTMENT (OUTPATIENT)
Dept: GENERAL RADIOLOGY | Facility: HOSPITAL | Age: 74
End: 2024-08-23
Attending: ORTHOPAEDIC SURGERY
Payer: MEDICARE

## 2024-08-23 ENCOUNTER — ANESTHESIA EVENT (OUTPATIENT)
Dept: SURGERY | Facility: HOSPITAL | Age: 74
End: 2024-08-23
Payer: MEDICARE

## 2024-08-23 ENCOUNTER — ANESTHESIA (OUTPATIENT)
Dept: SURGERY | Facility: HOSPITAL | Age: 74
End: 2024-08-23
Payer: MEDICARE

## 2024-08-23 ENCOUNTER — HOSPITAL ENCOUNTER (OUTPATIENT)
Facility: HOSPITAL | Age: 74
Discharge: HOME HEALTH | End: 2024-08-26
Attending: ORTHOPAEDIC SURGERY | Admitting: ORTHOPAEDIC SURGERY
Payer: MEDICARE

## 2024-08-23 DIAGNOSIS — Z01.818 PREOP TESTING: ICD-10-CM

## 2024-08-23 DIAGNOSIS — M17.12 PRIMARY OSTEOARTHRITIS OF LEFT KNEE: Primary | ICD-10-CM

## 2024-08-23 PROBLEM — I10 ESSENTIAL HYPERTENSION: Status: ACTIVE | Noted: 2024-08-23

## 2024-08-23 PROCEDURE — 73560 X-RAY EXAM OF KNEE 1 OR 2: CPT | Performed by: ORTHOPAEDIC SURGERY

## 2024-08-23 PROCEDURE — 99204 OFFICE O/P NEW MOD 45 MIN: CPT | Performed by: HOSPITALIST

## 2024-08-23 PROCEDURE — 0SRD0J9 REPLACEMENT OF LEFT KNEE JOINT WITH SYNTHETIC SUBSTITUTE, CEMENTED, OPEN APPROACH: ICD-10-PCS | Performed by: ORTHOPAEDIC SURGERY

## 2024-08-23 PROCEDURE — 8E0YXBZ COMPUTER ASSISTED PROCEDURE OF LOWER EXTREMITY: ICD-10-PCS | Performed by: ORTHOPAEDIC SURGERY

## 2024-08-23 DEVICE — IMPLANTABLE DEVICE: Type: IMPLANTABLE DEVICE | Site: KNEE | Status: FUNCTIONAL

## 2024-08-23 DEVICE — IMPLANTABLE DEVICE: Type: IMPLANTABLE DEVICE | Site: KNEE

## 2024-08-23 DEVICE — IMPLANTABLE DEVICE
Type: IMPLANTABLE DEVICE | Site: KNEE | Status: FUNCTIONAL
Brand: BIOMET® BONE CEMENT R

## 2024-08-23 DEVICE — IMPLANTABLE DEVICE
Type: IMPLANTABLE DEVICE | Site: KNEE | Status: FUNCTIONAL
Brand: PERSONA® VIVACIT-E®

## 2024-08-23 RX ORDER — MIDAZOLAM HYDROCHLORIDE 1 MG/ML
INJECTION INTRAMUSCULAR; INTRAVENOUS AS NEEDED
Status: DISCONTINUED | OUTPATIENT
Start: 2024-08-23 | End: 2024-08-23 | Stop reason: SURG

## 2024-08-23 RX ORDER — ASPIRIN 325 MG
325 TABLET, DELAYED RELEASE (ENTERIC COATED) ORAL ONCE
Status: DISCONTINUED | OUTPATIENT
Start: 2024-08-23 | End: 2024-08-23 | Stop reason: HOSPADM

## 2024-08-23 RX ORDER — DIPHENHYDRAMINE HCL 25 MG
25 CAPSULE ORAL EVERY 4 HOURS PRN
Status: DISCONTINUED | OUTPATIENT
Start: 2024-08-23 | End: 2024-08-26

## 2024-08-23 RX ORDER — MORPHINE SULFATE 2 MG/ML
2 INJECTION, SOLUTION INTRAMUSCULAR; INTRAVENOUS EVERY 2 HOUR PRN
Status: DISCONTINUED | OUTPATIENT
Start: 2024-08-23 | End: 2024-08-26

## 2024-08-23 RX ORDER — NALOXONE HYDROCHLORIDE 0.4 MG/ML
0.08 INJECTION, SOLUTION INTRAMUSCULAR; INTRAVENOUS; SUBCUTANEOUS AS NEEDED
Status: DISCONTINUED | OUTPATIENT
Start: 2024-08-23 | End: 2024-08-23 | Stop reason: HOSPADM

## 2024-08-23 RX ORDER — MORPHINE SULFATE 4 MG/ML
4 INJECTION, SOLUTION INTRAMUSCULAR; INTRAVENOUS EVERY 2 HOUR PRN
Status: DISCONTINUED | OUTPATIENT
Start: 2024-08-23 | End: 2024-08-26

## 2024-08-23 RX ORDER — OXYCODONE HCL 10 MG/1
10 TABLET, FILM COATED, EXTENDED RELEASE ORAL
Status: COMPLETED | OUTPATIENT
Start: 2024-08-23 | End: 2024-08-23

## 2024-08-23 RX ORDER — HYDROCODONE BITARTRATE AND ACETAMINOPHEN 7.5; 325 MG/1; MG/1
1 TABLET ORAL EVERY 4 HOURS PRN
Status: DISCONTINUED | OUTPATIENT
Start: 2024-08-23 | End: 2024-08-25

## 2024-08-23 RX ORDER — BISACODYL 10 MG
10 SUPPOSITORY, RECTAL RECTAL
Status: DISCONTINUED | OUTPATIENT
Start: 2024-08-23 | End: 2024-08-26

## 2024-08-23 RX ORDER — HYDROMORPHONE HYDROCHLORIDE 1 MG/ML
0.2 INJECTION, SOLUTION INTRAMUSCULAR; INTRAVENOUS; SUBCUTANEOUS EVERY 5 MIN PRN
Status: DISCONTINUED | OUTPATIENT
Start: 2024-08-23 | End: 2024-08-23 | Stop reason: HOSPADM

## 2024-08-23 RX ORDER — MORPHINE SULFATE 10 MG/ML
6 INJECTION, SOLUTION INTRAMUSCULAR; INTRAVENOUS EVERY 10 MIN PRN
Status: DISCONTINUED | OUTPATIENT
Start: 2024-08-23 | End: 2024-08-23 | Stop reason: HOSPADM

## 2024-08-23 RX ORDER — SODIUM CHLORIDE, SODIUM LACTATE, POTASSIUM CHLORIDE, CALCIUM CHLORIDE 600; 310; 30; 20 MG/100ML; MG/100ML; MG/100ML; MG/100ML
INJECTION, SOLUTION INTRAVENOUS CONTINUOUS
Status: DISCONTINUED | OUTPATIENT
Start: 2024-08-23 | End: 2024-08-23 | Stop reason: HOSPADM

## 2024-08-23 RX ORDER — MORPHINE SULFATE 2 MG/ML
1 INJECTION, SOLUTION INTRAMUSCULAR; INTRAVENOUS EVERY 2 HOUR PRN
Status: DISCONTINUED | OUTPATIENT
Start: 2024-08-23 | End: 2024-08-26

## 2024-08-23 RX ORDER — MORPHINE SULFATE 1 MG/ML
INJECTION, SOLUTION EPIDURAL; INTRATHECAL; INTRAVENOUS
Status: COMPLETED | OUTPATIENT
Start: 2024-08-23 | End: 2024-08-23

## 2024-08-23 RX ORDER — OXYCODONE HCL 10 MG/1
10 TABLET, FILM COATED, EXTENDED RELEASE ORAL EVERY 12 HOURS
Status: DISCONTINUED | OUTPATIENT
Start: 2024-08-23 | End: 2024-08-26

## 2024-08-23 RX ORDER — HYDROCODONE BITARTRATE AND ACETAMINOPHEN 7.5; 325 MG/1; MG/1
1 TABLET ORAL EVERY 4 HOURS PRN
Qty: 25 TABLET | Refills: 0 | Status: SHIPPED | OUTPATIENT
Start: 2024-08-23 | End: 2024-08-26

## 2024-08-23 RX ORDER — HYDROMORPHONE HYDROCHLORIDE 1 MG/ML
0.6 INJECTION, SOLUTION INTRAMUSCULAR; INTRAVENOUS; SUBCUTANEOUS EVERY 5 MIN PRN
Status: DISCONTINUED | OUTPATIENT
Start: 2024-08-23 | End: 2024-08-23 | Stop reason: HOSPADM

## 2024-08-23 RX ORDER — METOCLOPRAMIDE HYDROCHLORIDE 5 MG/ML
10 INJECTION INTRAMUSCULAR; INTRAVENOUS EVERY 8 HOURS PRN
Status: DISCONTINUED | OUTPATIENT
Start: 2024-08-23 | End: 2024-08-25

## 2024-08-23 RX ORDER — METOCLOPRAMIDE HYDROCHLORIDE 5 MG/ML
INJECTION INTRAMUSCULAR; INTRAVENOUS AS NEEDED
Status: DISCONTINUED | OUTPATIENT
Start: 2024-08-23 | End: 2024-08-23 | Stop reason: SURG

## 2024-08-23 RX ORDER — ACETAMINOPHEN 500 MG
1000 TABLET ORAL ONCE
Status: COMPLETED | OUTPATIENT
Start: 2024-08-23 | End: 2024-08-23

## 2024-08-23 RX ORDER — DIPHENHYDRAMINE HYDROCHLORIDE 50 MG/ML
12.5 INJECTION INTRAMUSCULAR; INTRAVENOUS EVERY 4 HOURS PRN
Status: DISCONTINUED | OUTPATIENT
Start: 2024-08-23 | End: 2024-08-26

## 2024-08-23 RX ORDER — POLYETHYLENE GLYCOL 3350 17 G/17G
17 POWDER, FOR SOLUTION ORAL DAILY PRN
Status: DISCONTINUED | OUTPATIENT
Start: 2024-08-23 | End: 2024-08-26

## 2024-08-23 RX ORDER — AMLODIPINE BESYLATE 2.5 MG/1
2.5 TABLET ORAL DAILY
Status: DISCONTINUED | OUTPATIENT
Start: 2024-08-24 | End: 2024-08-26

## 2024-08-23 RX ORDER — ASPIRIN 325 MG
325 TABLET ORAL 2 TIMES DAILY
Status: DISCONTINUED | OUTPATIENT
Start: 2024-08-23 | End: 2024-08-26

## 2024-08-23 RX ORDER — BUPIVACAINE HYDROCHLORIDE 7.5 MG/ML
INJECTION, SOLUTION INTRASPINAL
Status: COMPLETED | OUTPATIENT
Start: 2024-08-23 | End: 2024-08-23

## 2024-08-23 RX ORDER — ONDANSETRON 2 MG/ML
4 INJECTION INTRAMUSCULAR; INTRAVENOUS EVERY 6 HOURS PRN
Status: DISCONTINUED | OUTPATIENT
Start: 2024-08-23 | End: 2024-08-23 | Stop reason: HOSPADM

## 2024-08-23 RX ORDER — SODIUM CHLORIDE, SODIUM LACTATE, POTASSIUM CHLORIDE, CALCIUM CHLORIDE 600; 310; 30; 20 MG/100ML; MG/100ML; MG/100ML; MG/100ML
INJECTION, SOLUTION INTRAVENOUS CONTINUOUS
Status: DISCONTINUED | OUTPATIENT
Start: 2024-08-23 | End: 2024-08-26

## 2024-08-23 RX ORDER — ONDANSETRON 2 MG/ML
INJECTION INTRAMUSCULAR; INTRAVENOUS AS NEEDED
Status: DISCONTINUED | OUTPATIENT
Start: 2024-08-23 | End: 2024-08-23 | Stop reason: SURG

## 2024-08-23 RX ORDER — MORPHINE SULFATE 4 MG/ML
2 INJECTION, SOLUTION INTRAMUSCULAR; INTRAVENOUS EVERY 10 MIN PRN
Status: DISCONTINUED | OUTPATIENT
Start: 2024-08-23 | End: 2024-08-23 | Stop reason: HOSPADM

## 2024-08-23 RX ORDER — ONDANSETRON 2 MG/ML
4 INJECTION INTRAMUSCULAR; INTRAVENOUS EVERY 6 HOURS PRN
Status: DISCONTINUED | OUTPATIENT
Start: 2024-08-23 | End: 2024-08-26

## 2024-08-23 RX ORDER — SCOLOPAMINE TRANSDERMAL SYSTEM 1 MG/1
1 PATCH, EXTENDED RELEASE TRANSDERMAL ONCE
Status: COMPLETED | OUTPATIENT
Start: 2024-08-23 | End: 2024-08-26

## 2024-08-23 RX ORDER — HYDROMORPHONE HYDROCHLORIDE 1 MG/ML
0.4 INJECTION, SOLUTION INTRAMUSCULAR; INTRAVENOUS; SUBCUTANEOUS EVERY 5 MIN PRN
Status: DISCONTINUED | OUTPATIENT
Start: 2024-08-23 | End: 2024-08-23 | Stop reason: HOSPADM

## 2024-08-23 RX ORDER — ASPIRIN 325 MG
325 TABLET ORAL 2 TIMES DAILY
Qty: 42 TABLET | Refills: 0 | Status: SHIPPED | OUTPATIENT
Start: 2024-08-23

## 2024-08-23 RX ORDER — SODIUM PHOSPHATE, DIBASIC AND SODIUM PHOSPHATE, MONOBASIC 7; 19 G/230ML; G/230ML
1 ENEMA RECTAL ONCE AS NEEDED
Status: DISCONTINUED | OUTPATIENT
Start: 2024-08-23 | End: 2024-08-26

## 2024-08-23 RX ORDER — DEXAMETHASONE SODIUM PHOSPHATE 4 MG/ML
VIAL (ML) INJECTION AS NEEDED
Status: DISCONTINUED | OUTPATIENT
Start: 2024-08-23 | End: 2024-08-23 | Stop reason: SURG

## 2024-08-23 RX ORDER — TRANEXAMIC ACID 100 MG/ML
INJECTION, SOLUTION INTRAVENOUS AS NEEDED
Status: DISCONTINUED | OUTPATIENT
Start: 2024-08-23 | End: 2024-08-23 | Stop reason: SURG

## 2024-08-23 RX ORDER — DOCUSATE SODIUM 100 MG/1
100 CAPSULE, LIQUID FILLED ORAL 2 TIMES DAILY
Status: DISCONTINUED | OUTPATIENT
Start: 2024-08-23 | End: 2024-08-26

## 2024-08-23 RX ORDER — ATORVASTATIN CALCIUM 20 MG/1
20 TABLET, FILM COATED ORAL DAILY
Status: DISCONTINUED | OUTPATIENT
Start: 2024-08-24 | End: 2024-08-26

## 2024-08-23 RX ORDER — MORPHINE SULFATE 4 MG/ML
4 INJECTION, SOLUTION INTRAMUSCULAR; INTRAVENOUS EVERY 10 MIN PRN
Status: DISCONTINUED | OUTPATIENT
Start: 2024-08-23 | End: 2024-08-23 | Stop reason: HOSPADM

## 2024-08-23 RX ORDER — SENNOSIDES 8.6 MG
17.2 TABLET ORAL NIGHTLY
Status: DISCONTINUED | OUTPATIENT
Start: 2024-08-23 | End: 2024-08-26

## 2024-08-23 RX ORDER — METOCLOPRAMIDE HYDROCHLORIDE 5 MG/ML
10 INJECTION INTRAMUSCULAR; INTRAVENOUS EVERY 8 HOURS PRN
Status: DISCONTINUED | OUTPATIENT
Start: 2024-08-23 | End: 2024-08-23 | Stop reason: HOSPADM

## 2024-08-23 RX ORDER — SODIUM CHLORIDE, SODIUM LACTATE, POTASSIUM CHLORIDE, CALCIUM CHLORIDE 600; 310; 30; 20 MG/100ML; MG/100ML; MG/100ML; MG/100ML
INJECTION, SOLUTION INTRAVENOUS CONTINUOUS
Status: DISCONTINUED | OUTPATIENT
Start: 2024-08-23 | End: 2024-08-25 | Stop reason: ALTCHOICE

## 2024-08-23 RX ADMIN — ONDANSETRON 4 MG: 2 INJECTION INTRAMUSCULAR; INTRAVENOUS at 10:41:00

## 2024-08-23 RX ADMIN — TRANEXAMIC ACID 1000 MG: 100 INJECTION, SOLUTION INTRAVENOUS at 10:53:00

## 2024-08-23 RX ADMIN — MORPHINE SULFATE 0.2 MG: 1 INJECTION, SOLUTION EPIDURAL; INTRATHECAL; INTRAVENOUS at 10:36:00

## 2024-08-23 RX ADMIN — BUPIVACAINE HYDROCHLORIDE 1.4 ML: 7.5 INJECTION, SOLUTION INTRASPINAL at 10:36:00

## 2024-08-23 RX ADMIN — DEXAMETHASONE SODIUM PHOSPHATE 4 MG: 4 MG/ML VIAL (ML) INJECTION at 10:41:00

## 2024-08-23 RX ADMIN — METOCLOPRAMIDE HYDROCHLORIDE 10 MG: 5 INJECTION INTRAMUSCULAR; INTRAVENOUS at 10:41:00

## 2024-08-23 RX ADMIN — MIDAZOLAM HYDROCHLORIDE 2 MG: 1 INJECTION INTRAMUSCULAR; INTRAVENOUS at 10:33:00

## 2024-08-23 RX ADMIN — SODIUM CHLORIDE, SODIUM LACTATE, POTASSIUM CHLORIDE, CALCIUM CHLORIDE: 600; 310; 30; 20 INJECTION, SOLUTION INTRAVENOUS at 12:38:00

## 2024-08-23 RX ADMIN — SODIUM CHLORIDE, SODIUM LACTATE, POTASSIUM CHLORIDE, CALCIUM CHLORIDE: 600; 310; 30; 20 INJECTION, SOLUTION INTRAVENOUS at 10:24:00

## 2024-08-23 NOTE — ANESTHESIA POSTPROCEDURE EVALUATION
Patient: Zaida Osuna    Procedure Summary       Date: 08/23/24 Room / Location: [facility] MAIN OR  / [facility] MAIN OR    Anesthesia Start: 1024 Anesthesia Stop: 1240    Procedure: Left total knee arthroplasty with patient specific instruments (Left: Knee) Diagnosis:       Primary osteoarthritis of left knee      (Primary osteoarthritis of left knee [M17.12])    Surgeons: Gilberto Lees MD Anesthesiologist: Maria Teresa Grace MD    Anesthesia Type: spinal, MAC ASA Status: 2            Anesthesia Type: spinal, MAC    Vitals Value Taken Time   /87 08/23/24 1239   Temp 97.2 °F (36.2 °C) 08/23/24 1239   Pulse 68 08/23/24 1240   Resp 18 08/23/24 1240   SpO2 99 % 08/23/24 1240   Vitals shown include unfiled device data.    [facility] AN Post Evaluation:   Patient Evaluated in PACU  Patient Participation: complete - patient participated  Level of Consciousness: awake and alert  Pain Management: adequate  Airway Patency:patent  Dental exam unchanged from preop  Yes    Nausea/Vomiting: none  Cardiovascular Status: acceptable  Respiratory Status: acceptable  Postoperative Hydration acceptable      SAMEER PINA CRNA  8/23/2024 12:40 PM

## 2024-08-23 NOTE — CM/SW NOTE
08/23/24 1600   CM/SW Referral Data   Referral Source Physician   Reason for Referral Discharge planning   Informant Patient;Clinical Staff Member   Medical Hx   Does patient have an established PCP? Yes  (Marcelle Brown)   Patient Info   Patient's Current Mental Status at Time of Assessment Alert;Oriented   Patient's Home Environment Independent Living  ([facility])   Patient lives with Alone   Patient Status Prior to Admission   Independent with ADLs and Mobility Yes   Services in place prior to admission DME/Supplies at home   Type of DME/Supplies Straight Cane   Discharge Needs   Anticipated D/C needs To be determined     Pt discussed during nursing rounds. Pt is stable for dc today. MD dc order entered.  No home care needs identified on dc.     Plan: Home today   / to remain available for support and/or discharge planning.   LIO Maciel    [phone]

## 2024-08-23 NOTE — ANESTHESIA PROCEDURE NOTES
Spinal Block    Date/Time: 8/23/2024 10:36 AM    Performed by: Maria Teresa Grace MD  Authorized by: Maria Teresa Grace MD      General Information and Staff    Start Time:  8/23/2024 10:36 AM  End Time:  8/23/2024 10:38 AM  Anesthesiologist:  Maria Teresa Grace MD  Performed by:  Anesthesiologist  Site identification: surface landmarks    Preanesthetic Checklist: patient identified, IV checked, risks and benefits discussed, monitors and equipment checked, pre-op evaluation, timeout performed, anesthesia consent and sterile technique used      Procedure Details    Patient Position:  Sitting  Prep: ChloraPrep    Monitoring:  Cardiac monitor, heart rate and continuous pulse ox  Approach:  Midline  Location:  L3-4  Injection Technique:  Single-shot    Needle    Needle Type:  Sprotte  Needle Gauge:  24 G  Needle Length:  3.5 in    Assessment    Sensory Level:  T10  Events: clear CSF, CSF aspirated, well tolerated and blood negative      Additional Comments

## 2024-08-23 NOTE — OPERATIVE REPORT
Operative Note    Patient Name: Zaida Osuna    Preoperative Diagnosis: Primary osteoarthritis of left knee [M17.12]    Postoperative Diagnosis: Primary osteoarthritis of left knee [M17.12]    Primary Surgeon: YANIV GUERRERO MD     Assistant: Dao Bartlett    Procedures: L TKA with PSI, preoperative computer navigation    Surgical Findings: above    Anesthesia: Spinal    Complications: none    Specimen: L knee bone and soft tissue to pathology    Drains: hemovac, hurtado    Condition: stable to RR    Estimated Blood Loss: 100cc    YANIV GUERRERO MD

## 2024-08-23 NOTE — CONSULTS
[facility]    PATIENT'S NAME: ULISES RASHID   ATTENDING PHYSICIAN: Gilberto Lees MD   CONSULTING PHYSICIAN: Debbie Hatfield MD   PATIENT ACCOUNT#:   [id number]    LOCATION:  [facility] PACU 10 [facility] 10  MEDICAL RECORD #:   [id number]       YOB: 1950  ADMISSION DATE:       08/23/2024      CONSULT DATE:  08/23/2024    REPORT OF CONSULTATION      REASON FOR ADMISSION:  Post left total knee arthroplasty.    HISTORY OF PRESENT ILLNESS:  The patient is a 73-year-old  female with chronic left knee pain, underlying severe primary osteoarthritis.  Failed outpatient conservative medical management options.  Scheduled today for above-mentioned procedure by her orthopedic surgeon, Dr. Gilberto Lees.  Preoperatively she had spinal block.  Postoperatively transferred to PACU for further monitoring.    PAST MEDICAL HISTORY:  Generalized osteoarthritis, anxiety, hypertension, hyperlipidemia, irritable bowel syndrome.    PAST SURGICAL HISTORY:  Cataract procedure, hernia repair, and total abdominal hysterectomy.    MEDICATIONS:  Please see medication reconciliation list.     ALLERGIES:  Latex.    SOCIAL HISTORY:  No tobacco, alcohol, or drug use.  Lives with her family.  Independent for basic activities of daily living.     FAMILY HISTORY:  Sister had breast cancer.  Father had Parkinson's disease.    REVIEW OF SYSTEMS:  Currently resting in bed.  No left knee pain.  No chest pain, no shortness of breath.  Other 12-point review of systems is negative.        PHYSICAL EXAMINATION:    GENERAL:  Alert and oriented to time, place, and person.  No acute distress.    VITAL SIGNS:  Temperature 97.2, pulse 59, respiratory rate 17, blood pressure 186/83, pulse ox 97% on room air.  HEENT:  Atraumatic.  Oropharynx clear.  Moist mucous membranes.  Normal hard and soft palate.  Eyes:  Anicteric sclerae.  NECK:  Supple.  No lymphadenopathy.  Trachea midline.  Full range of motion.  LUNGS:  Clear to auscultation  bilaterally.  Normal respiratory effort.    HEART:  Regular rate and rhythm.  S1 and S2 auscultated.  No murmur.  ABDOMEN:  Soft, nondistended.  No tenderness.  Positive bowel sounds.  EXTREMITIES:  Left knee dressing.  Hemovac surgical drain.  No leg edema, clubbing, or cyanosis.  NEUROLOGIC:  Decreased sensation and muscle movement, both lower extremities, post spinal block.  No other focal findings.    ASSESSMENT AND PLAN:    1.   Left knee primary osteoarthritis, status post left total knee arthroplasty, spinal block.  Pain control.  Neurovascular checks.  Aspirin for DVT prophylaxis.  Physical and occupational therapy.  2.   Essential hypertension.  Continue home medications and monitor.  3.   Hyperlipidemia.  Continue home medications.      Dictated By Debbie Hatfield MD  d: 08/23/2024 13:15:44  t: 08/23/2024 13:37:08  Job [id number]  FB/

## 2024-08-23 NOTE — CM/SW NOTE
08/23/24 1600   CM/SW Referral Data   Referral Source Physician   Reason for Referral Discharge planning   Informant Patient;Clinical Staff Member   Medical Hx   Does patient have an established PCP? Yes  (Marcelle Brown)   Patient Info   Patient's Current Mental Status at Time of Assessment Alert;Oriented   Patient's Home Environment Independent Living  ([facility])   Patient lives with Alone   Patient Status Prior to Admission   Independent with ADLs and Mobility Yes   Services in place prior to admission DME/Supplies at home   Type of DME/Supplies Straight Cane   Discharge Needs   Anticipated D/C needs To be determined     Pt discussed during nursing rounds. Sx left TKA with Dr. Lees. From [facility] in [place], independent w/cane prior to admission. Pt does have a son but he is unlikely able to assist at dc. Pt relies on her neighbors for assistance when needed. HH referral sent to [facility] in [facility]. [facility] is preferred agency for surgeon. PT/OT evals scheduled for tomorrow 8/24.    SDOH received for transportation. Resources placed in AVS.    Plan: TBD    / to remain available for support and/or discharge planning.   LIO Maciel    [phone]

## 2024-08-23 NOTE — ANESTHESIA PREPROCEDURE EVALUATION
Anesthesia PreOp Note    HPI:     Zaida Osuna is a 73 year old female who presents for preoperative consultation requested by: Gilberto Lees MD    Date of Surgery: 8/23/2024    Procedure(s):  Left total knee arthroplasty with patient specific instruments  Indication: Primary osteoarthritis of left knee [M17.12]    Relevant Problems   No relevant active problems       NPO:  Last Liquid Consumption Date: 08/23/24  Last Liquid Consumption Time: 0630 (sips of water with meds)  Last Solid Consumption Date: 08/22/24  Last Solid Consumption Time: 2130  Last Liquid Consumption Date: 08/23/24          History Review:  Patient Active Problem List    Diagnosis Date Noted    Hematuria 01/26/2022    Gastroesophageal reflux disease 10/20/2021    Right shoulder pain 07/02/2021    Osteopenia with high risk of fracture 04/16/2021    Mixed hyperlipidemia 03/12/2021       Past Medical History:    Anxiety state    Back problem    neck pain- pressure and tightness    Essential hypertension    High blood pressure    High cholesterol    Hx of motion sickness    IBS (irritable bowel syndrome)    PONV (postoperative nausea and vomiting)    lasts for hours       Past Surgical History:   Procedure Laterality Date    Cataract Left 01/21/2018    Cataract Right 07/31/2018    Hernia surgery  07/11/2016    Hysterectomy  09/08/2020    Total abdom hysterectomy         Medications Prior to Admission   Medication Sig Dispense Refill Last Dose    atorvastatin 20 MG Oral Tab Take 1 tablet (20 mg total) by mouth daily.   8/21/2024    amLODIPine 2.5 MG Oral Tab Take 1 tablet (2.5 mg total) by mouth daily.   8/23/2024 at 0630    Polyethylene Glycol 3350 (MIRALAX OR) Take by mouth.   8/20/2024    acetaminophen 500 MG Oral Tab Take 1 tablet (500 mg total) by mouth daily as needed.   8/22/2024 at 2100    Cholecalciferol 50 MCG (2000 UT) Oral Cap Vitamin d and k combined   8/19/2024    albuterol 108 (90 Base) MCG/ACT Inhalation Aero Soln Inhale 2 puffs into  the lungs every 4 (four) hours as needed for Wheezing or Shortness of Breath (cough). 1 each 3 More than a month     Current Facility-Administered Medications Ordered in Epic   Medication Dose Route Frequency Provider Last Rate Last Admin    lactated ringers infusion   Intravenous Continuous Gilberto Lees MD 20 mL/hr at 08/23/24 0816 New Bag at 08/23/24 0816    ceFAZolin (Ancef) 2g in 10mL IV syringe premix  2 g Intravenous On Call to OR Ladonna Bartlett PA-C         No current [facility]-ordered outpatient medications on file.       Allergies   Allergen Reactions    Antispasmodic NAUSEA AND VOMITING     pt unsure of reaction    Chlorthalidone DIZZINESS    Belladonna Alk-Phenobarbital UNKNOWN     pt unsure of reaction    Latex RASH       Family History   Problem Relation Age of Onset    Breast Cancer Sister         61-69    Breast Cancer Sister 40    Other (Parkinson's disease) Father     Seizure Disorder Daughter     Ovarian Cancer Neg      Social History     Socioeconomic History    Marital status: Single   Tobacco Use    Smoking status: Never     Passive exposure: Yes    Smokeless tobacco: Never   Vaping Use    Vaping status: Never Used   Substance and Sexual Activity    Alcohol use: Never    Drug use: Never       Available pre-op labs reviewed.             Vital Signs:  Body mass index is 22.89 kg/m².   height is 1.524 m (5') and weight is 53.2 kg (117 lb 3.2 oz). Her oral temperature is 97.9 °F (36.6 °C). Her blood pressure is 148/61 and her pulse is 61. Her respiration is 16 and oxygen saturation is 100%.   Vitals:    08/16/24 0831 08/23/24 0757   BP:  148/61   Pulse:  61   Resp:  16   Temp:  97.9 °F (36.6 °C)   TempSrc:  Oral   SpO2:  100%   Weight: 55.3 kg (122 lb) 53.2 kg (117 lb 3.2 oz)   Height: 1.524 m (5') 1.524 m (5')        Anesthesia Evaluation     Patient summary reviewed and Nursing notes reviewed    History of anesthetic complications   Airway   Mallampati: II  TM distance: >3 FB  Neck ROM: full   Dental - Dentition appears grossly intact     Pulmonary - negative ROS and normal exam   Cardiovascular - normal exam  Exercise tolerance: good  (+) hypertension    Neuro/Psych    (+)  anxiety/panic attacks,        GI/Hepatic/Renal    (+) GERD    Endo/Other - negative ROS   Abdominal  - normal exam                 Anesthesia Plan:   ASA:  2  Plan:   Spinal and MAC  Plan Comments: Plan for spinal with sedation during case. Backup plan of GA. Patient agrees to plan.  Informed Consent Plan and Risks Discussed With:  Patient      I have informed Zaida Osuna and/or legal guardian or family member of the nature of the anesthetic plan, benefits, risks including possible dental damage if relevant, major complications, and any alternative forms of anesthetic management.   All of the patient's questions were answered to the best of my ability. The patient desires the anesthetic management as planned.  Maria Teresa Grace MD  8/23/2024 9:57 AM  Present on Admission:  **None**

## 2024-08-23 NOTE — PLAN OF CARE
Problem: Patient Centered Care  Goal: Patient preferences are identified and integrated in the patient's plan of care  Description: Interventions:  - What would you like us to know as we care for you? Patient lives alone in an independent living facility and she has a son who is her support system.  - Provide timely, complete, and accurate information to patient/family  - Incorporate patient and family knowledge, values, beliefs, and cultural backgrounds into the planning and delivery of care  - Encourage patient/family to participate in care and decision-making at the level they choose  - Honor patient and family perspectives and choices  Outcome: Progressing     Problem: Patient/Family Goals  Goal: Patient/Family Long Term Goal  Description: Patient's Long Term Goal: Patient will be able to walk independently with walker and will have no complications from surgery.    Interventions:  - Up with walker as much as tolerated, WBAT to the left leg.  - Monitor incision for any signs of infection or bleeding.  - Pain management with oral medication.  - Take oral anticoagulation to prevent blood clots.  - May ice incision to prevent swelling or help reduce pain.    - [facility] PT to follow.  - Follow up with surgery as recommended.  - See additional Care Plan goals for specific interventions  Outcome: Progressing  Goal: Patient/Family Short Term Goal  Description: Patient's Short Term Goal: Home when stable.    Interventions:   - Up with walker as much as tolerated, WBAT to the left leg.  - Monitor incision for any signs of infection or bleeding.  - Pain management with oral medication.  - Administer oral anticoagulation and apply SCD's to both legs to prevent blood clots.  - May ice incision to prevent swelling or help reduce pain.   - PT/OT as ordered.   - Monitor labs and VS as ordered and refer as needed.   - See additional Care Plan goals for specific interventions  Outcome: Progressing     Problem: PAIN - ADULT  Goal:  Verbalizes/displays adequate comfort level or patient's stated pain goal  Description: INTERVENTIONS:  - Encourage pt to monitor pain and request assistance  - Assess pain using appropriate pain scale  - Administer analgesics based on type and severity of pain and evaluate response  - Implement non-pharmacological measures as appropriate and evaluate response  - Consider cultural and social influences on pain and pain management  - Manage/alleviate anxiety  - Utilize distraction and/or relaxation techniques  - Monitor for opioid side effects  - Notify MD/LIP if interventions unsuccessful or patient reports new pain  - Anticipate increased pain with activity and pre-medicate as appropriate  Outcome: Progressing     Problem: RISK FOR INFECTION - ADULT  Goal: Absence of fever/infection during anticipated neutropenic period  Description: INTERVENTIONS  - Monitor WBC  - Administer growth factors as ordered  - Implement neutropenic guidelines  Outcome: Progressing     Problem: SAFETY ADULT - FALL  Goal: Free from fall injury  Description: INTERVENTIONS:  - Assess pt frequently for physical needs  - Identify cognitive and physical deficits and behaviors that affect risk of falls.  - [place] fall precautions as indicated by assessment.  - Educate pt/family on patient safety including physical limitations  - Instruct pt to call for assistance with activity based on assessment  - Modify environment to reduce risk of injury  - Provide assistive devices as appropriate  - Consider OT/PT consult to assist with strengthening/mobility  - Encourage toileting schedule  Outcome: Progressing     Problem: DISCHARGE PLANNING  Goal: Discharge to home or other facility with appropriate resources  Description: INTERVENTIONS:  - Identify barriers to discharge w/pt and caregiver  - Include patient/family/discharge partner in discharge planning  - Arrange for needed discharge resources and transportation as appropriate  - Identify discharge  learning needs (meds, wound care, etc)  - Arrange for interpreters to assist at discharge as needed  - Consider post-discharge preferences of patient/family/discharge partner  - Complete POLST form as appropriate  - Assess patient's ability to be responsible for managing their own health  - Refer to Case Management Department for coordinating discharge planning if the patient needs post-hospital services based on physician/LIP order or complex needs related to functional status, cognitive ability or social support system  Outcome: Progressing     Problem: GENITOURINARY - ADULT  Goal: Absence of urinary retention  Description: INTERVENTIONS:  - Assess patient’s ability to void and empty bladder  - Monitor intake/output and perform bladder scan as needed  - Follow urinary retention protocol/standard of care  - Consider collaborating with pharmacy to review patient's medication profile  - Implement strategies to promote bladder emptying  Outcome: Progressing     Problem: SKIN/TISSUE INTEGRITY - ADULT  Goal: Incision(s), wounds(s) or drain site(s) healing without S/S of infection  Description: INTERVENTIONS:  - Assess and document risk factors for pressure ulcer development  - Assess and document skin integrity  - Assess and document dressing/incision, wound bed, drain sites and surrounding tissue  - Implement wound care per orders  - Initiate isolation precautions as appropriate  - Initiate Pressure Ulcer prevention bundle as indicated  Outcome: Progressing     Problem: MUSCULOSKELETAL - ADULT  Goal: Return mobility to safest level of function  Description: INTERVENTIONS:  - Assess patient stability and activity tolerance for standing, transferring and ambulating w/ or w/o assistive devices  - Assist with transfers and ambulation using safe patient handling equipment as needed  - Ensure adequate protection for wounds/incisions during mobilization  - Obtain PT/OT consults as needed  - Advance activity as appropriate  -  Communicate ordered activity level and limitations with patient/family  Outcome: Progressing  Goal: Maintain proper alignment of affected body part  Description: INTERVENTIONS:  - Support and protect limb and body alignment per provider's orders  - Instruct and reinforce with patient and family use of appropriate assistive device and precautions (e.g. spinal or hip dislocation precautions)  Outcome: Progressing    Patient is alert and oriented, aware to call for help as needed.  Patient is currently in room air, denies shortness of breathing nor chest pain.  Patient is up in the chair, was transferred with 1 min assist of staff.  Patient has a hurtado catheter, draining to yellow colored urine with good output.  Patient is tolerating oral and IVP meds for pain.  Patient plans to go home with [facility] and is open to rehab as needed.

## 2024-08-23 NOTE — DISCHARGE INSTRUCTIONS
Norco or tylenol for pain.  Aspirin 325mg twice daily for 3 weeks for blood clot prevention.  May remove dressing and place new gauze or mepilex over incision in 4 days.  Keep incision clean and dry.  Ice and elevate knee.  Weight bear as tolerated.  Use walker/crutches for comfort.  Follow up with Dr. Lees/Ladonna Bartlett in 2 weeks [phone].     Healthy Driven Van by: [facility]  Next Steps:  Call [phone] to schedule an appointment.  About: [facility] has partnered with [facility] to offer a patient transportation service for patients who need help with transportation from home to the hospital for medical visits. The service provides transportation to patients who live in communities surrounding the [facility] campus.    This program provides:    - Transportation for Healthcare    An experienced  will provide service from a patient’s door and with stops at the Main Entrance of [facility], the [facility], the [facility], and the [facility] in [place]. The Healthy Driven van is available for patients who live in a 7-10 mile radius of the [facility].    Oxygen can be transported if properly secured. Patients must have their own oxygen. Let the service know at the time the appointment is scheduled so that mounting equipment can be provided.    This service costs $5 for one-way and $10 for two-way.    Eligibility: Must not being a patient that is being discharged from the hospital. Patients must be ambulatory or wheelchair independent. This program serves individuals north of the [facility] to [place] including [place], East to [place], South to [facility] including [place] Singh/[place] excluding [facility], and West to [facility] including [place].    Nearest location: 2.08 miles away.  [facility]  [address]  [place], [place]  [address]   [phone]  Hours:  Monday:07:00 AM - 05:00 PM  Tuesday:07:00 AM - 05:00 PM  Wednesday:07:00 AM - 05:00 PM  Thursday:07:00 AM - 05:00 PM  Friday:07:00 AM - 05:00 PM  Senior Rides by: [facility]  Next Steps:    Call [phone] to schedule an appointment.    About: [facility] offers rides for seniors in our community for a nominal fee. The availability of transportation enables seniors to live independently and helps prevent isolation.    Services include:    - Transportation    It is our goal to ensure access to essential services such as medical care and grocery shopping in the safest, most cost-effective ways possible. Seniors can be driven within the [facility] boundaries for medical appointments, their shopping needs, and any other appointments they might have.    Due to the overwhelming demand for our transportation services, we are now requiring a lead time of at least 5 business days in advance to make a reservation.    Eligibility: This program helps people who are older than 54 years old. Must reside in [facility]. This includes all of [place], [place], [place], [place], [place], and small sections of [place], [place], [place], and [place].    Nearest location: 2.46 miles away.  [facility]  [address] [place] [address]   [phone]  Hours:  Monday:08:00 AM - 05:00 PM  Tuesday:08:00 AM - 05:00 PM  Wednesday:08:00 AM - 05:00 PM  Thursday:08:00 AM - 05:00 PM  Friday:08:00 AM - 05:00 PM  Rideshare Transportation by: [facility] [employer]Alert[employer]  Next Steps:    Call [phone] to register.    About: [facility] helps seniors live independently in the community. They help to schedule and arrange escorted volunteer rideshare transportation to medical appointments, therapies, grocery shopping and other necessary errands to sustain independent living.    This program provides:    - Rideshare transportation    Additional services  include:    - Tri-annual newsletter  - Facilitation of interaction between congregations, the communities and social service organizations  - Friendly visitation  - Library homebound delivery service    Eligibility: Their service area includes: [place], [place], [place], [place], [facility], [place], [place], [place], [place], [place], [facility], [place], [place], [place], [place], [facility], [place], [place], [place], and [place]. This program helps people who are older than 59 years old.    Nearest location: 4.45 miles away.  [facility]  [address] [place] [address]   [phone]  Hours:  Monday:08:30 AM - 02:30 PM  Tuesday:08:30 AM - 02:30 PM  Wednesday:08:30 AM - 02:30 PM  Thursday:08:30 AM - 02:30 PM  Friday:08:30 AM - 02:30 PM    Sometimes managing your health at home requires assistance.  The [facility] team has recognized your preference to use [facility].  They can be reached by phone at [phone].  The fax number for your reference is [phone].  A representative from the home health agency will contact you or your family to schedule your first visit.

## 2024-08-24 LAB
ANION GAP SERPL CALC-SCNC: 8 MMOL/L (ref 0–18)
BUN BLD-MCNC: 6 MG/DL (ref 9–23)
BUN/CREAT SERPL: 10 (ref 10–20)
CALCIUM BLD-MCNC: 8.6 MG/DL (ref 8.7–10.4)
CHLORIDE SERPL-SCNC: 106 MMOL/L (ref 98–112)
CO2 SERPL-SCNC: 25 MMOL/L (ref 21–32)
CREAT BLD-MCNC: 0.6 MG/DL
EGFRCR SERPLBLD CKD-EPI 2021: 95 ML/MIN/1.73M2 (ref 60–?)
GLUCOSE BLD-MCNC: 97 MG/DL (ref 70–99)
HCT VFR BLD AUTO: 28.2 %
HGB BLD-MCNC: 9.2 G/DL
IRON SATN MFR SERPL: 7 %
IRON SERPL-MCNC: 12 UG/DL
OSMOLALITY SERPL CALC.SUM OF ELEC: 286 MOSM/KG (ref 275–295)
POTASSIUM SERPL-SCNC: 4.1 MMOL/L (ref 3.5–5.1)
SODIUM SERPL-SCNC: 139 MMOL/L (ref 136–145)
TIBC SERPL-MCNC: 176 UG/DL (ref 250–425)
TRANSFERRIN SERPL-MCNC: 118 MG/DL (ref 250–380)

## 2024-08-24 PROCEDURE — 99214 OFFICE O/P EST MOD 30 MIN: CPT | Performed by: HOSPITALIST

## 2024-08-24 RX ORDER — ACETAMINOPHEN 325 MG/1
650 TABLET ORAL EVERY 6 HOURS PRN
Status: DISCONTINUED | OUTPATIENT
Start: 2024-08-24 | End: 2024-08-26

## 2024-08-24 RX ORDER — DEXTROSE MONOHYDRATE AND SODIUM CHLORIDE 5; .45 G/100ML; G/100ML
INJECTION, SOLUTION INTRAVENOUS CONTINUOUS
Status: DISCONTINUED | OUTPATIENT
Start: 2024-08-24 | End: 2024-08-25

## 2024-08-24 RX ORDER — KETOROLAC TROMETHAMINE 15 MG/ML
15 INJECTION, SOLUTION INTRAMUSCULAR; INTRAVENOUS ONCE AS NEEDED
Status: COMPLETED | OUTPATIENT
Start: 2024-08-24 | End: 2024-08-24

## 2024-08-24 NOTE — PLAN OF CARE
POD1 L TKA.  Pain managed with ex release oxycodone and norco.  Dwyer removed, check void.  Hemovac drain set to bulb suction, output documented.   Aspirin for DVT prophylaxis.   Problem: Patient Centered Care  Goal: Patient preferences are identified and integrated in the patient's plan of care  Description: Interventions:  - What would you like us to know as we care for you? Patient lives alone in an independent living facility and she has a son who is her support system.  - Provide timely, complete, and accurate information to patient/family  - Incorporate patient and family knowledge, values, beliefs, and cultural backgrounds into the planning and delivery of care  - Encourage patient/family to participate in care and decision-making at the level they choose  - Honor patient and family perspectives and choices  Outcome: Progressing     Problem: Patient/Family Goals  Goal: Patient/Family Long Term Goal  Description: Patient's Long Term Goal: Patient will be able to walk independently with walker and will have no complications from surgery.    Interventions:  - Up with walker as much as tolerated, WBAT to the left leg.  - Monitor incision for any signs of infection or bleeding.  - Pain management with oral medication.  - Take oral anticoagulation to prevent blood clots.  - May ice incision to prevent swelling or help reduce pain.    - [facility] PT to follow.  - Follow up with surgery as recommended.  - See additional Care Plan goals for specific interventions  Outcome: Progressing  Goal: Patient/Family Short Term Goal  Description: Patient's Short Term Goal: Home when stable.    Interventions:   - Up with walker as much as tolerated, WBAT to the left leg.  - Monitor incision for any signs of infection or bleeding.  - Pain management with oral medication.  - Administer oral anticoagulation and apply SCD's to both legs to prevent blood clots.  - May ice incision to prevent swelling or help reduce pain.   - PT/OT as  ordered.   - Monitor labs and VS as ordered and refer as needed.   - See additional Care Plan goals for specific interventions  Outcome: Progressing     Problem: PAIN - ADULT  Goal: Verbalizes/displays adequate comfort level or patient's stated pain goal  Description: INTERVENTIONS:  - Encourage pt to monitor pain and request assistance  - Assess pain using appropriate pain scale  - Administer analgesics based on type and severity of pain and evaluate response  - Implement non-pharmacological measures as appropriate and evaluate response  - Consider cultural and social influences on pain and pain management  - Manage/alleviate anxiety  - Utilize distraction and/or relaxation techniques  - Monitor for opioid side effects  - Notify MD/LIP if interventions unsuccessful or patient reports new pain  - Anticipate increased pain with activity and pre-medicate as appropriate  Outcome: Progressing     Problem: RISK FOR INFECTION - ADULT  Goal: Absence of fever/infection during anticipated neutropenic period  Description: INTERVENTIONS  - Monitor WBC  - Administer growth factors as ordered  - Implement neutropenic guidelines  Outcome: Progressing     Problem: SAFETY ADULT - FALL  Goal: Free from fall injury  Description: INTERVENTIONS:  - Assess pt frequently for physical needs  - Identify cognitive and physical deficits and behaviors that affect risk of falls.  - [place] fall precautions as indicated by assessment.  - Educate pt/family on patient safety including physical limitations  - Instruct pt to call for assistance with activity based on assessment  - Modify environment to reduce risk of injury  - Provide assistive devices as appropriate  - Consider OT/PT consult to assist with strengthening/mobility  - Encourage toileting schedule  Outcome: Progressing

## 2024-08-24 NOTE — PHYSICAL THERAPY NOTE
PHYSICAL THERAPY KNEE EVALUATION - INPATIENT      Room Number: 423/423-A  Evaluation Date: 8/24/2024  Type of Evaluation: Initial  Physician Order: PT Eval and Treat    Presenting Problem: L TKA  Co-Morbidities : OA  Reason for Therapy: Mobility Dysfunction and Discharge Planning    PHYSICAL THERAPY ASSESSMENT   Patient is a 73 year old female admitted 8/23/2024 for LTKA.  Prior to admission, patient's baseline is indep with mobility and ADLs. Patient is currently functioning below baseline with bed mobility, transfers, gait, and stair negotiation.  Patient is requiring contact guard assist and minimal assist as a result of the following impairments: decreased functional strength, pain, impaired standing balance, decreased muscular endurance, and limited L knee ROM.  Physical Therapy will continue to follow for duration of hospitalization.    Patient will benefit from continued skilled PT Services at discharge to promote prior level of function.  Anticipate patient will return home with home health PT. If pt does not progress as anticipated, may benefit from gradual rehab.    PLAN  PT Treatment Plan: Bed mobility;Energy conservation;Patient education;Gait training;Strengthening;Stair training;Transfer training;Balance training;Body mechanics  Rehab Potential : Good  Frequency (Obs): BID    PHYSICAL THERAPY MEDICAL/SOCIAL HISTORY       Problem List  Principal Problem:    Primary osteoarthritis of left knee  Active Problems:    Mixed hyperlipidemia    Essential hypertension      HOME SITUATION  Home Situation  Type of Home: Independent living facility  Home Layout: One level  Stairs to Enter : 0  Stairs to Bedroom: 0  Lives With: Alone  Drives: Yes  Patient Owned Equipment: Rolling walker;Cane     Prior Level of [place]: indep with ADLs and mobiltiy    SUBJECTIVE  It really hurts. It hurts more than I thought it would.    PHYSICAL THERAPY EXAMINATION   OBJECTIVE  Precautions: Limb alert - left  Fall Risk:  Standard fall risk    WEIGHT BEARING RESTRICTION  Weight Bearing Restriction: L lower extremity           L Lower Extremity: Weight Bearing as Tolerated    PAIN ASSESSMENT  Ratin  Location: Lknee  Management Techniques: Activity promotion;Repositioning;Breathing techniques    COGNITION  Overall Cognitive Status:  WFL - within functional limits    RANGE OF MOTION AND STRENGTH ASSESSMENT  Upper extremity ROM and strength are within functional limits  Lower extremity ROM is within functional limits on RLE  Lower extremity strength is within functional limits on RLE    BALANCE  Static Sitting: Good  Dynamic Sitting: Fair +  Static Standing: Fair -  Dynamic Standing: Fair -                                                                       ADDITIONAL TESTS                                                   ACTIVITY TOLERANCE  Pulse: 64  Heart Rate Source: Monitor     BP: 139/81  BP Location: Right arm  BP Method: Automatic  Patient Position: Sitting    O2 WALK  Oxygen Therapy  SPO2% on Room Air at Rest: 98    AM-PAC '6-Clicks' INPATIENT SHORT FORM - BASIC MOBILITY  How much difficulty does the patient currently have...  Patient Difficulty: Turning over in bed (including adjusting bedclothes, sheets and blankets)?: A Little   Patient Difficulty: Sitting down on and standing up from a chair with arms (e.g., wheelchair, bedside commode, etc.): A Little   Patient Difficulty: Moving from lying on back to sitting on the side of the bed?: A Little   How much help from another person does the patient currently need...   Help from Another: Moving to and from a bed to a chair (including a wheelchair)?: A Little   Help from Another: Need to walk in hospital room?: A Little   Help from Another: Climbing 3-5 steps with a railing?: A Lot     AM-PAC Score:  Raw Score: 17   Approx Degree of Impairment: 50.57%   Standardized Score (AM-PAC Scale): 42.13   CMS Modifier (G-Code): CK     FUNCTIONAL ABILITY STATUS  Functional  Mobility/Gait Assessment  Gait Assistance: Contact guard assist  Distance (ft): 15  Assistive Device: Rolling walker  Pattern: L Decreased stance time (step to gait pattern, sig dec lisa)  Rolling: supervision  Supine to Sit: minimal assist  Sit to Supine:  NT  Sit to Stand: contact guard assist    Exercise/Education Provided:  Bed mobility  Gait training  Lower therapeutic exercise:  Ankle pumps  Heel slides  Transfer training  PT eval    Skilled Therapy Provided: Pt ok to be seen per RACHEL Justice. Pt educ in role of PT and PT POC. Pt willing to participate. Pt limited by pain in LLE, dec from 10 to a 9 on the pain scale. Pt educ in transfers and gait with RW, cued for RW placement relative to body, and for knee flexion in swing vs skimming foot on ground. Pt then limited by nausea, vomited into garbage can 3x. Pt positioned on rolling chair over toilet, then brought to bedside chair for transfer. Pt positioned comfortably in chair with emesis bucket, pt declined ice on knee. RN aware. Therex handout not provided as educ was not performed d/t pt's nausea.    The patient's Approx Degree of Impairment: 50.57% has been calculated based on documentation in the [facility] '6 clicks' Inpatient Basic Mobility Short Form.  Research supports that patients with this level of impairment may benefit from home with HHPT vs GR--on the border. Anticipate home with HHPT, but if pt has medical setbacks may benefit from GR.  Final disposition will be made by interdisciplinary medical team.    Patient End of Session: Up in chair;Needs met;Call light within reach;RN aware of session/findings;All patient questions and concerns addressed (RN aware of pt vomiting)    CURRENT GOALS  Goals to be met by: 8/31/24  Patient Goal Patient's self-stated goal is: less pain   Goal #1 Patient is able to demonstrate supine - sit EOB @ level: modified independent     Goal #1   Current Status    Goal #2 Patient is able to demonstrate transfers Sit to/from  Stand at assistance level: modified independent     Goal #2  Current Status    Goal #3 Patient is able to ambulate 300 feet with assistive device at assistance level: modified independent    Goal #3   Current Status    Goal #4    Goal #4   Current Status    Goal #5  AROM 0 degrees extension to 95 degrees flexion     Goal #5   Current Status    Goal #6 Patient independently performs home exercise program for ROM/strengthening per the instructions provided in preparation for discharge.   Goal #6  Current Status      Patient Evaluation Complexity Level:  History Low - no personal factors and/or co-morbidities   Examination of body systems Low -  addressing 1-2 elements   Clinical Presentation  Moderate - Evolving   Clinical Decision Making  Moderate Complexity     Gait Training: 15 minutes

## 2024-08-24 NOTE — PROGRESS NOTES
Orders received and chart reviewed.  OT spoke w/ RN who requested OT eval hold at this time d/t N/V and a current needs to address pain management. Will cont to follow as schedule allows.     RN aware       08/24/24 1100   VISIT TYPE   OT Inpatient Visit Type (Documentation Required) Attempted Evaluation

## 2024-08-24 NOTE — PROGRESS NOTES
Subjective: Pain poorly controlled.  Has not been able to walk much due to pain.    Objective:  Patient Vitals for the past 24 hrs:   BP Temp Temp src Pulse Resp SpO2 Height Weight   08/24/24 0356 136/68 99.1 °F (37.3 °C) Oral -- 16 92 % -- --   08/24/24 0001 139/69 98.8 °F (37.1 °C) Oral 57 16 92 % -- --   08/23/24 2100 149/75 -- -- 65 16 97 % -- --   08/23/24 1402 151/77 -- -- -- 16 -- -- --   08/23/24 1339 154/80 97.3 °F (36.3 °C) -- 53 20 100 % -- --   08/23/24 1329 (!) 158/92 -- -- 56 11 100 % -- --   08/23/24 1319 146/88 -- -- 61 24 100 % -- --   08/23/24 1309 (!) 156/94 -- -- 57 15 100 % -- --   08/23/24 1259 159/81 -- -- 59 11 99 % -- --   08/23/24 1254 -- -- -- -- -- 97 % -- --   08/23/24 1249 (!) 186/83 -- -- 57 17 97 % -- --   08/23/24 1239 155/87 97.2 °F (36.2 °C) Temporal 75 19 95 % -- --   08/23/24 0757 148/61 97.9 °F (36.6 °C) Oral 61 16 100 % 5' (1.524 m) 117 lb 3.2 oz (53.2 kg)     Sitting in bed in no acute distress.   Dressing is clean, dry, and intact.  Foot light touch sensation is intact.  Ankle dorsiflexion strength is 5/5. Ankle plantarflexion strength is 5/5.  No calf tenderness.  No calf swelling.  Herlinda's sign is negative.   The lower/upper extremity is neurovascularly intact.   The lower/upper extremity compartments are supple and non-tender.   Lab Results   Component Value Date    WBC 3.9 (L) 12/19/2022    RBC 4.35 12/19/2022    HGB 9.2 (L) 08/24/2024    MCV 95.2 12/19/2022    MCH 30.6 12/19/2022    MCHC 32.1 12/19/2022    RDW 12.9 12/19/2022     No results found for: \"INR\"    Assessment:  Postoperative day #1   S/p LTKA, valgus deformity correction    Plan:      Continue pain control.  Continue DVT prophylaxis.  Continue PT/OT.  D/C planning:  home when pain controlled on PO agents.  May need subacute rehab.    YANIV GUERRERO MD

## 2024-08-24 NOTE — PHYSICAL THERAPY NOTE
Called RN re: PM session for pt. Pt still limited by nausea and uncontrolled pain. Will hold this afternoon and plan for therapy session tomorrow morning.

## 2024-08-24 NOTE — OPERATIVE REPORT
[facility]    PATIENT'S NAME: ULISES RASHID   ATTENDING PHYSICIAN: Gilberto Lees MD   OPERATING PHYSICIAN: Gilberto Lees MD   PATIENT ACCOUNT#:   [id number]    LOCATION:  [address] [place] [address]  MEDICAL RECORD #:   [id number]       YOB: 1950  ADMISSION DATE:       08/23/2024      OPERATION DATE:  08/23/2024    OPERATIVE REPORT      PREOPERATIVE DIAGNOSIS:    1.   Left knee osteoarthritis, primary.  2.   Left knee valgus deformity.  POSTOPERATIVE DIAGNOSIS:    1.   Left knee osteoarthritis, primary.  2.   Left knee valgus deformity.  PROCEDURE:  Left total knee arthroplasty with patient-specific instrumentation and preoperative computer navigation.    ASSISTANT:  Ladonna Bartlett PA-C.  Second assistant, Emir Dc CSA.    ANESTHESIA:  General.    COMPLICATIONS:  None.    BLOOD LOSS:  100 mL.    SPECIMENS:  Left knee bone and soft tissue to Pathology.    DRAINS:  Hemovac drain to suction device.  Dwyer catheter to gravity drainage.    INDICATIONS:  Patient is a 73-year-old female with history of progressive left knee pain and deformity.  Preoperative physical findings and imaging studies were consistent with end-stage osteoarthritis with a significant valgus deformity of the knee.  She failed conservative treatments including anti-inflammatory medications, therapeutic exercise, intra-articular injections, activity modifications, and attempts at weight loss.  After discussion with the patient the risks and benefits of proceeding with operative treatment of the right knee consisting of a total knee arthroplasty, she wished to proceed with surgery.    OPERATIVE TECHNIQUE:  The patient was identified in the preoperative holding area.  The appropriate consents were obtained.  She was taken to the operating room, placed in supine position on the operating room table.  After adequate spinal anesthesia and sedation was achieved, a Dwyer catheter was inserted using sterile technique.  A tourniquet was  placed on the left thigh.  An SCD device was placed on the right lower extremity.  The patient was given preoperative IV antibiotics and IV tranexamic acid.  The left knee and lower extremity were then prepped and draped in a sterile fashion.  Left foot was padded and placed in a foot nelson for the [facility] type knee positioner.  The extremity was exsanguinated.  The tourniquet was inflated to 250 mmHg.  A longitudinal incision was then made over the anterior aspect of the knee.  A medial parapatellar retinacular incision was made.  Portions of the superficial medial collateral ligament were sharply elevated from the anteromedial proximal tibia.  The menisci and cruciate ligaments were excised.  The patella was everted, and a freehand method was used to resect approximately 9 mm of articular surface.  Care was taken to make the patellar cut parallel to the dorsal surface of the patella.  The patella was sized and a size 32 implant was chosen.  The drill guide was applied to the cut surface in a medial superior orientation, and the 3 drill holes were made.  A patellar trial was inserted and appeared to fit appropriately.  We then placed a patellar protector, brought the knee into a hyperflexed position.  The patient-specific pin guide for the distal femur was applied to the anterior distal femur.  The anterior and distal pins were placed.  Distal pins were removed.  Distal cutting guide was next inserted.  The patient had full extension and therefore, additional resection distally was avoided.  Distal cut was made in the appropriate valgus orientation, and the bone fragment from the medial femoral condyle was sized on the back table and appeared consistent with the preoperative plan.  We next placed the 4-in-1 cutting guide for a size 7 femur.  This was secured, and the rotation alignment was checked.  It appeared appropriately aligned with Whitesides line.  Anterior, posterior, and chamfer cuts were made.  The  posterior chamfer was somewhat deficient due to the patient's significant lateral femoral condyle hypoplasia.  Next, a trial implant was placed and lateralized and secured.  We then placed the  for the PS plus Persona implant, and the box was cut in the usual fashion.  We then placed the PCL retractor, and the patient-specific pin guide for the proximal tibia was applied to the anteromedial proximal tibia.  The anterior and superior pins were placed.  Superior pins were removed.  The tibial cutting guide was placed, and the alignment was checked.  It appeared appropriately aligned with the tibial shaft in all 3 planes.  Tibial cut was made.  Bone fragment was removed and sized on the back table and appeared consistent with the preoperative plan.  We placed the tibial trial and secured the trial after aligning with the superior drill holes.  Appropriate rotation was noted with the medial third of the tibial tubercle.  The tibia was finished with the reamer and keel cutter.  We chose a stem extension and reamed accordingly.  We again placed the femoral trial lateralized and secured.  We then drilled the lug holes for the femur.  We placed a 10 mm polyethylene bearing PS plus Jenae Persona trial.  The knee was brought through range of motion.  It was adequately stable in all planes.  Full range of motion was achieved.  There was no impingement on the implants.  The trial implants were next removed, and using a third-generation cementing technique, we cemented in a size E tibia, a size 7 Persona posterior-stabilized implant, and a size 32 patellar implant.  Excess cement was removed.  We again trialed and chose a 10 mm Jenae Persona constrained posterior-stabilized fixed bearing surface.  This was snapped into position with the Jenae .  Tourniquet was deflated.  Attention was paid to hemostasis.  The knee was suction irrigated.  The retinacular layer was repaired with 0 Ethibond sutures in  interrupted fashion.  A 1/8-inch Hemovac drain was placed deep to the fascial layer next to the anterolateral thigh.  Skin and subcutaneous layers were closed with 2-0 Vicryl sutures and skin staples.  Sterile dressing was applied.  The patient's anesthesia was reversed.  She was taken to the recovery room in stable condition.  All sponge and instrument counts were reported as correct.  The attending physician, Dr. Lees, was present and performed all critical portions of the procedure.  There were no complications.  First and second assistants were medically necessary for the surgery.  They assisted with patient positioning, retraction of soft tissues for accurate placement of the implants and cutting guides.  They assisted with cement fragment removal, hemostasis, and wound closure.  Without the aid of the assistant, the surgical procedure would not have been possible.    Dictated By Gilberto Lees MD  d: 08/23/2024 12:19:32  t: 08/23/2024 22:29:14  Job [id number]  DLO/

## 2024-08-24 NOTE — HOME CARE LIAISON
.Received referral via [facility] for Home Health services. Spoke w/ patient who is agreeable with [facility]. Contact information placed on AVS.

## 2024-08-24 NOTE — PLAN OF CARE
Pt a/o x4. On RA. Pain not well controlled this morning. IV morphine given. Dressing to knee debulked. Hemovac removed. Ice applied. Up to bathroom with one assist short distances. SCDs on bilaterally while in bed. Had some nausea and a bout of vomiting after working with PT. Zofran given. Pt slept for a stretch of time and then felt better.     Pain better controlled this afternoon. Requesting only tylenol for pain, order obtained. Ambulating to and from the bathroom numerous times, x1 assist with RW. Plan to go back to her independent living facility with help from neighbors and [facility].     Problem: PAIN - ADULT  Goal: Verbalizes/displays adequate comfort level or patient's stated pain goal  Description: INTERVENTIONS:  - Encourage pt to monitor pain and request assistance  - Assess pain using appropriate pain scale  - Administer analgesics based on type and severity of pain and evaluate response  - Implement non-pharmacological measures as appropriate and evaluate response  - Consider cultural and social influences on pain and pain management  - Manage/alleviate anxiety  - Utilize distraction and/or relaxation techniques  - Monitor for opioid side effects  - Notify MD/LIP if interventions unsuccessful or patient reports new pain  - Anticipate increased pain with activity and pre-medicate as appropriate  Outcome: Progressing     Problem: RISK FOR INFECTION - ADULT  Goal: Absence of fever/infection during anticipated neutropenic period  Description: INTERVENTIONS  - Monitor WBC  - Administer growth factors as ordered  - Implement neutropenic guidelines  Outcome: Progressing     Problem: SAFETY ADULT - FALL  Goal: Free from fall injury  Description: INTERVENTIONS:  - Assess pt frequently for physical needs  - Identify cognitive and physical deficits and behaviors that affect risk of falls.  - [place] fall precautions as indicated by assessment.  - Educate pt/family on patient safety including physical limitations  -  Instruct pt to call for assistance with activity based on assessment  - Modify environment to reduce risk of injury  - Provide assistive devices as appropriate  - Consider OT/PT consult to assist with strengthening/mobility  - Encourage toileting schedule  Outcome: Progressing     Problem: DISCHARGE PLANNING  Goal: Discharge to home or other facility with appropriate resources  Description: INTERVENTIONS:  - Identify barriers to discharge w/pt and caregiver  - Include patient/family/discharge partner in discharge planning  - Arrange for needed discharge resources and transportation as appropriate  - Identify discharge learning needs (meds, wound care, etc)  - Arrange for interpreters to assist at discharge as needed  - Consider post-discharge preferences of patient/family/discharge partner  - Complete POLST form as appropriate  - Assess patient's ability to be responsible for managing their own health  - Refer to Case Management Department for coordinating discharge planning if the patient needs post-hospital services based on physician/LIP order or complex needs related to functional status, cognitive ability or social support system  Outcome: Progressing     Problem: GENITOURINARY - ADULT  Goal: Absence of urinary retention  Description: INTERVENTIONS:  - Assess patient’s ability to void and empty bladder  - Monitor intake/output and perform bladder scan as needed  - Follow urinary retention protocol/standard of care  - Consider collaborating with pharmacy to review patient's medication profile  - Implement strategies to promote bladder emptying  Outcome: Progressing     Problem: SKIN/TISSUE INTEGRITY - ADULT  Goal: Incision(s), wounds(s) or drain site(s) healing without S/S of infection  Description: INTERVENTIONS:  - Assess and document risk factors for pressure ulcer development  - Assess and document skin integrity  - Assess and document dressing/incision, wound bed, drain sites and surrounding tissue  -  Implement wound care per orders  - Initiate isolation precautions as appropriate  - Initiate Pressure Ulcer prevention bundle as indicated  Outcome: Progressing     Problem: MUSCULOSKELETAL - ADULT  Goal: Return mobility to safest level of function  Description: INTERVENTIONS:  - Assess patient stability and activity tolerance for standing, transferring and ambulating w/ or w/o assistive devices  - Assist with transfers and ambulation using safe patient handling equipment as needed  - Ensure adequate protection for wounds/incisions during mobilization  - Obtain PT/OT consults as needed  - Advance activity as appropriate  - Communicate ordered activity level and limitations with patient/family  Outcome: Progressing  Goal: Maintain proper alignment of affected body part  Description: INTERVENTIONS:  - Support and protect limb and body alignment per provider's orders  - Instruct and reinforce with patient and family use of appropriate assistive device and precautions (e.g. spinal or hip dislocation precautions)  Outcome: Progressing

## 2024-08-24 NOTE — PROGRESS NOTES
[facility]  part of [facility]    Progress Note    Zaida Osuna Patient Status:  Outpatient in a Bed    1950 MRN [id number]   Location [facility] 4W/SW/SE Attending Meseret Giordano MD   Hosp Day # 0 PCP KEMAR JAY MD     Chief Complaint: L knee pain     Subjective:   Zaida Osuna is having uncontrolled pain in L knee 10/10 at this time. + nausea. Vomited earlier today - taking pain meds on empty stomach though. Hasn't eaten much. Also a little dizzy       Objective:   Objective:    Blood pressure 139/81, pulse 64, temperature 99.1 °F (37.3 °C), temperature source Oral, resp. rate 14, height 5' (1.524 m), weight 117 lb 3.2 oz (53.2 kg), SpO2 93%, not currently breastfeeding.    Physical Exam:    General: No acute distress.   Respiratory: Clear to auscultation bilaterally. No wheezes. No rhonchi.  Cardiovascular: S1, S2. Regular rate and rhythm. No murmurs, rubs or gallops.   Abdomen: Soft, nontender, nondistended.  Positive bowel sounds. No rebound or guarding.  Neurologic: No focal neurological deficits.   Musculoskeletal: Moves all extremities.  Extremities: No edema.      Results:   Results:    Labs:  Recent Labs   Lab 24  0354   HGB 9.2*       Recent Labs   Lab 24  0354   GLU 97   BUN 6*   CREATSERUM 0.60   CA 8.6*      K 4.1      CO2 25.0       Estimated Creatinine Clearance: 60 mL/min (based on SCr of 0.6 mg/dL).    No results for input(s): \"PTP\", \"INR\" in the last 168 hours.         Culture:  No results found for this visit on 24.    Cardiac  No results for input(s): \"TROP\", \"PBNP\" in the last 168 hours.      Imaging: Imaging data reviewed in Epic.  XR KNEE (1 OR 2 VIEWS), LEFT (CPT=73560)    Result Date: 2024  CONCLUSION: Postoperative changes of left knee arthroplasty.   Dictated by (CST): Can Pitt MD on 2024 at 1:31 PM     Finalized by (CST): Can Pitt MD on 2024 at 1:32 PM           Medications:    scopolamine  1  patch Transdermal Once    amLODIPine  2.5 mg Oral Daily    atorvastatin  20 mg Oral Daily    sennosides  17.2 mg Oral Nightly    docusate sodium  100 mg Oral BID    aspirin  325 mg Oral BID    oxyCODONE ER  10 mg Oral 2 times per day         Assessment and Plan:   Assessment & Plan:      L knee primary osteoarthritis   S/p L total knee arthroplasty 8/23   Orthopedic surgery on consult  PT/OT - home PT   ASA for DVT prevention per surgery  Oxycontin 10mg BID, norco PRN   Acute blood loss anemia, expected postop   Hb preop 12.5 now down to 9   Iron panel. If low start replacement   Rpt H/H tomorrow   EBL with surgery 100mL   Essential HTN   Amlodipine  Hyperlipidemia   Lipitor        >35min spent, >50% spent counseling and coordinating care in the form of educating pt/family and d/w consultants and staff. Most of the time spent discussing the above plan.        Plan of care discussed with patient or family at bedside.    Meseret Giordano MD  Hospitalist          Supplementary Documentation:     Quality:  DVT Prophylaxis: ASA  CODE status: Full  Dispo: per clinical course            Estimated date of discharge: TBD  Discharge is dependent on: clinical stability  At this point Ms. Osuna is expected to be discharge to: home

## 2024-08-25 LAB
ANION GAP SERPL CALC-SCNC: 6 MMOL/L (ref 0–18)
BUN BLD-MCNC: 9 MG/DL (ref 9–23)
BUN/CREAT SERPL: 13.8 (ref 10–20)
CALCIUM BLD-MCNC: 9.1 MG/DL (ref 8.7–10.4)
CHLORIDE SERPL-SCNC: 105 MMOL/L (ref 98–112)
CO2 SERPL-SCNC: 29 MMOL/L (ref 21–32)
CREAT BLD-MCNC: 0.65 MG/DL
DEPRECATED RDW RBC AUTO: 44.2 FL (ref 35.1–46.3)
EGFRCR SERPLBLD CKD-EPI 2021: 93 ML/MIN/1.73M2 (ref 60–?)
ERYTHROCYTE [DISTWIDTH] IN BLOOD BY AUTOMATED COUNT: 12.9 % (ref 11–15)
GLUCOSE BLD-MCNC: 109 MG/DL (ref 70–99)
HCT VFR BLD AUTO: 30 %
HGB BLD-MCNC: 10.1 G/DL
MCH RBC QN AUTO: 31.4 PG (ref 26–34)
MCHC RBC AUTO-ENTMCNC: 33.7 G/DL (ref 31–37)
MCV RBC AUTO: 93.2 FL
OSMOLALITY SERPL CALC.SUM OF ELEC: 289 MOSM/KG (ref 275–295)
PLATELET # BLD AUTO: 237 10(3)UL (ref 150–450)
POTASSIUM SERPL-SCNC: 3.7 MMOL/L (ref 3.5–5.1)
RBC # BLD AUTO: 3.22 X10(6)UL
SODIUM SERPL-SCNC: 140 MMOL/L (ref 136–145)
WBC # BLD AUTO: 9.1 X10(3) UL (ref 4–11)

## 2024-08-25 PROCEDURE — 99214 OFFICE O/P EST MOD 30 MIN: CPT | Performed by: HOSPITALIST

## 2024-08-25 RX ORDER — METOCLOPRAMIDE HYDROCHLORIDE 5 MG/ML
5 INJECTION INTRAMUSCULAR; INTRAVENOUS EVERY 8 HOURS PRN
Status: DISCONTINUED | OUTPATIENT
Start: 2024-08-25 | End: 2024-08-26

## 2024-08-25 RX ORDER — TRAMADOL HYDROCHLORIDE 50 MG/1
50 TABLET ORAL EVERY 6 HOURS PRN
Status: DISCONTINUED | OUTPATIENT
Start: 2024-08-25 | End: 2024-08-26

## 2024-08-25 NOTE — OCCUPATIONAL THERAPY NOTE
OCCUPATIONAL THERAPY EVALUATION - INPATIENT     Room Number: 423/423-A  Evaluation Date: 8/25/2024  Type of Evaluation: Initial  Presenting Problem: L TKA    Physician Order: IP Consult to Occupational Therapy  Reason for Therapy: ADL/IADL Dysfunction and Discharge Planning    OCCUPATIONAL THERAPY ASSESSMENT   Patient is a 73 year old female admitted 8/23/2024 for L TKA, WBAT.  Prior to admission, patient's baseline is independent living in [facility].  Patient is currently functioning below baseline with  self care/ADLs .  Patient is requiring minimal assist as a result of the following impairments: decreased functional strength, decreased functional reach, pain, and limited knee ROM. Occupational Therapy will continue to follow for duration of hospitalization.    Patient will benefit from continued skilled OT Services for duration of hospitalization, however, given the patient is functioning near baseline level do not anticipate skilled therapy needs at discharge     PLAN  OT Treatment Plan: Balance activities;ADL training;Functional transfer training;Patient/Family education;Compensatory technique education  OT Device Recommendations: Long-handled sponge; Reacher    OCCUPATIONAL THERAPY MEDICAL/SOCIAL HISTORY   Problem List   Principal Problem:    Primary osteoarthritis of left knee  Active Problems:    Mixed hyperlipidemia    Essential hypertension    HOME SITUATION  Type of Home: Independent living facility  Home Layout: One level  Lives With: Alone  Toilet and Equipment: Comfort height toilet  Other Equipment: -- (rw, cane)  Drives: Yes    Stairs in Home: none  Assistive Device(s) Used: RW and cane     Prior Level of [place]: Pt lives in an [facility]. She is independent with ADLs at baseline.     SUBJECTIVE  \"I am doing much better but it's still very sore\"    OCCUPATIONAL THERAPY EXAMINATION   OBJECTIVE  Precautions: Limb alert - left  Fall Risk: Standard fall risk    WEIGHT BEARING RESTRICTION  L Lower  Extremity: Weight Bearing as Tolerated    PAIN ASSESSMENT  Rating: Unable to rate  Location: left knee pain  Management Techniques: Relaxation; Repositioning    ACTIVITY TOLERANCE                         O2 SATURATIONS       COGNITION  Overall Cognitive Status:  WFL - within functional limits    RANGE OF MOTION   Upper extremity ROM is within functional limits     STRENGTH ASSESSMENT  Upper extremity strength is within functional limits     ACTIVITIES OF DAILY LIVING ASSESSMENT  AM-PAC ‘6-Clicks’ Inpatient Daily Activity Short Form  How much help from another person does the patient currently need…  -   Putting on and taking off regular lower body clothing?: A Little  -   Bathing (including washing, rinsing, drying)?: A Little  -   Toileting, which includes using toilet, bedpan or urinal? : A Little  -   Putting on and taking off regular upper body clothing?: None  -   Taking care of personal grooming such as brushing teeth?: A Little  -   Eating meals?: None    AM-PAC Score:  Score: 20  Approx Degree of Impairment: 38.32%  Standardized Score (AM-PAC Scale): 42.03  CMS Modifier (G-Code): CJ    FUNCTIONAL TRANSFER ASSESSMENT  Sit to Stand: Edge of Bed  Edge of Bed: Contact Guard Assist  Toilet Transfer: Contact Guard Assist    BED MOBILITY  Supine to Sit : Minimal Assist    BALANCE ASSESSMENT  Static Sitting: Stand-by Assist  Sitting Unilateral: Stand-by Assist  Static Standing: Contact Guard Assist  Standing Unilateral: Contact Guard Assist    FUNCTIONAL ADL ASSESSMENT  Eating: Independent  Grooming Standing: Contact Guard Assist (stood at the sink to brush her teeth with CGA)  Bathing Seated: Minimal Assist  LB Dressing Seated: Minimal Assist    THERAPEUTIC EXERCISE     Skilled Therapy Provided: RN approved session. Pt received in the bed, agreeable to tx. Pt reports \"unrated\" pain in left knee. Pt was able to tolerate functional transfers, ambulation to the bathroom, and standing at the sink to brush her teeth and  groom. Pt appears to have better pain control today. Will continue to follow.      EDUCATION PROVIDED  Patient: Role of Occupational Therapy; Plan of Care; Discharge Recommendations; Functional Transfer Techniques; Energy Conservation; Compensatory ADL Techniques  Patient's Response to Education: Verbalized Understanding    The patient's Approx Degree of Impairment: 38.32% has been calculated based on documentation in the [facility] '6 clicks' Inpatient Daily Activity Short Form.  Research supports that patients with this level of impairment may benefit from home with HH.  Final disposition will be made by interdisciplinary medical team.    Patient End of Session: Up in chair;With  staff (left in care of PT (Brent))    OT Goals  Patient self-stated goal is: unstated     Patient will complete LE dressing with SBA  Comment:     Patient will complete toilet transfer with SBA  Comment:     Patient will complete self care task at sink level with SBA   Comment:     Comment:         Goals  on:24  Frequency:3x/week    Patient Evaluation Complexity Level:   Occupational Profile/Medical History LOW - Brief history including review of medical or therapy records    Specific performance deficits impacting engagement in ADL/IADL LOW  1 - 3 performance deficits    Client Assessment/Performance Deficits LOW - No comorbidities nor modifications of tasks    Clinical Decision Making LOW - Analysis of occupational profile, problem-focused assessments, limited treatment options    Overall Complexity LOW     OT Session   Self-Care Home Management: 5 minutes  Therapeutic Activity: 10 minutes

## 2024-08-25 NOTE — PROGRESS NOTES
[facility]  part of [facility]    Progress Note    Zaida Osuna Patient Status:  Outpatient in a Bed    1950 MRN [id number]   Location [facility] 4W/SW/SE Attending Meseret Giordano MD   Hosp Day # 0 PCP KEMAR JAY MD     Chief Complaint: L knee pain     Subjective:   Zaida Osuna is having 9/10 pain. Nausea better. Slight dizziness. No SOB no CP no F/C. Working with OT     Objective:   Objective:    Blood pressure 139/65, pulse 95, temperature 97.2 °F (36.2 °C), temperature source Temporal, resp. rate 18, height 5' (1.524 m), weight 117 lb 3.2 oz (53.2 kg), SpO2 98%, not currently breastfeeding.    Physical Exam:    General: No acute distress.   Respiratory: Clear to auscultation bilaterally. No wheezes. No rhonchi.  Cardiovascular: S1, S2. Regular rate and rhythm. No murmurs, rubs or gallops.   Abdomen: Soft, nontender, nondistended.  Positive bowel sounds. No rebound or guarding.  Neurologic: No focal neurological deficits.   Musculoskeletal: Moves all extremities.  Extremities: No edema.      Results:   Results:    Labs:  Recent Labs   Lab 24  0354 24  0426   WBC  --  9.1   HGB 9.2* 10.1*   MCV  --  93.2   PLT  --  237.0       Recent Labs   Lab 24  0354 24  0425   GLU 97 109*   BUN 6* 9   CREATSERUM 0.60 0.65   CA 8.6* 9.1    140   K 4.1 3.7    105   CO2 25.0 29.0       Estimated Creatinine Clearance: 55.4 mL/min (based on SCr of 0.65 mg/dL).    No results for input(s): \"PTP\", \"INR\" in the last 168 hours.         Culture:  No results found for this visit on 24.    Cardiac  No results for input(s): \"TROP\", \"PBNP\" in the last 168 hours.      Imaging: Imaging data reviewed in Epic.  XR KNEE (1 OR 2 VIEWS), LEFT (CPT=73560)    Result Date: 2024  CONCLUSION: Postoperative changes of left knee arthroplasty.   Dictated by (CST): Can Pitt MD on 2024 at 1:31 PM     Finalized by (CST): Can Pitt MD on 2024 at 1:32 PM            Medications:    scopolamine  1 patch Transdermal Once    amLODIPine  2.5 mg Oral Daily    atorvastatin  20 mg Oral Daily    sennosides  17.2 mg Oral Nightly    docusate sodium  100 mg Oral BID    aspirin  325 mg Oral BID    oxyCODONE ER  10 mg Oral 2 times per day         Assessment and Plan:   Assessment & Plan:      L knee primary osteoarthritis   S/p L total knee arthroplasty 8/23   Orthopedic surgery on consult  PT/OT - home PT   ASA for DVT prevention per surgery  Oxycontin 10mg BID, tramadol PRn   Acute blood loss anemia, expected postop   Hb preop 12.5 now 10  EBL with surgery 100mL   H/H better. Monitor   Essential HTN   Amlodipine  Hyperlipidemia   Lipitor        >35min spent, >50% spent counseling and coordinating care in the form of educating pt/family and d/w consultants and staff. Most of the time spent discussing the above plan.        Plan of care discussed with patient or family at bedside.    Meseret Giordano MD  Hospitalist          Supplementary Documentation:     Quality:  DVT Prophylaxis: ASA  CODE status: Full  Dispo: per clinical course            Estimated date of discharge: TBD  Discharge is dependent on: clinical stability  At this point Ms. Osuna is expected to be discharge to: home

## 2024-08-25 NOTE — PHYSICAL THERAPY NOTE
PHYSICAL THERAPY KNEE TREATMENT NOTE - INPATIENT     Room Number: 423/423-A             Presenting Problem: L TKA  Co-Morbidities : OA    Problem List  Principal Problem:    Primary osteoarthritis of left knee  Active Problems:    Mixed hyperlipidemia    Essential hypertension      PHYSICAL THERAPY ASSESSMENT   Patient demonstrates good  progress this session, goals  remain in progress.    Patient is requiring contact guard assist as a result of the following impairments: decreased functional strength, decreased endurance/aerobic capacity, pain, and decreased muscular endurance.     Patient continues to function below baseline with bed mobility, transfers, gait, and standing prolonged periods.  Next session anticipate patient to progress bed mobility, transfers, gait, and standing prolonged periods.  Physical Therapy will continue to follow patient for duration of hospitalization.    Patient continues to benefit from continued skilled PT services: at discharge to promote prior level of function.  Anticipate patient will return home with home health PT.    PLAN  PT Treatment Plan: Bed mobility;Body mechanics;Coordination;Endurance;Energy conservation;Patient education;Gait training;Range of motion;Strengthening;Transfer training;Balance training  Frequency (Obs): Daily    SUBJECTIVE  Pt was agreeable to therapy session.           OBJECTIVE  Precautions: Limb alert - left    WEIGHT BEARING STATUS           L Lower Extremity: Weight Bearing as Tolerated    PAIN ASSESSMENT   Ratin  Location: L knee  Management Techniques: Activity promotion;Body mechanics;Relaxation;Repositioning    BALANCE  Static Sitting: Good  Dynamic Sitting: Fair +  Static Standing: Fair -  Dynamic Standing: Fair -    ACTIVITY TOLERANCE                         O2 WALK       AM-PAC '6-Clicks' INPATIENT SHORT FORM - BASIC MOBILITY  How much difficulty does the patient currently have...  Patient Difficulty: Turning over in bed (including adjusting  bedclothes, sheets and blankets)?: A Little   Patient Difficulty: Sitting down on and standing up from a chair with arms (e.g., wheelchair, bedside commode, etc.): A Little   Patient Difficulty: Moving from lying on back to sitting on the side of the bed?: A Little   How much help from another person does the patient currently need...   Help from Another: Moving to and from a bed to a chair (including a wheelchair)?: A Little   Help from Another: Need to walk in hospital room?: A Little   Help from Another: Climbing 3-5 steps with a railing?: A Little     AM-PAC Score:  Raw Score: 18   Approx Degree of Impairment: 46.58%   Standardized Score (AM-PAC Scale): 43.63   CMS Modifier (G-Code): CK    FUNCTIONAL ABILITY STATUS  Functional Mobility/Gait Assessment  Gait Assistance: Contact guard assist  Distance (ft): 100'  Assistive Device: Rolling walker  Pattern: L Decreased stance time  Rolling:  NT  Supine to Sit:  NT  Sit to Supine:  NT  Sit to Stand: contact guard assist    Skilled Therapy Provided: Pt is received in the chair with OT present and was cleared for therapy session. Pt is CGA with sit<>stand transfers with the RW. Pt was cued for proper hand placement and technique for safety. Pt was able to AMB about 100' with the RW CGA/SBA for balance and safety. Pt with decreased lisa and step length with narrow MIREYA but with good balance and safety awareness. Pt with improved mobility today. Returned pt back to the room and to sitting in the chair with all needs within reach. Discussed dc planning and pt's home situation. Pt is on track to dc to home once medically cleared. Pt reported that she has no stairs to negotiate at home. Reported to the RN on the status of the pt.     The patient's Approx Degree of Impairment: 46.58% has been calculated based on documentation in the [facility] '6 clicks' Inpatient Basic Mobility Short Form.  Research supports that patients with this level of impairment may benefit from Home  with [facility] and assist.  Final disposition will be made by interdisciplinary medical team.        Knee ROM                 Patient End of Session: Up in chair;Needs met;Call light within reach;RN aware of session/findings;All patient questions and concerns addressed;Alarm set    CURRENT GOALS  Goals to be met by: 8/31/24  Patient Goal Patient's self-stated goal is: less pain   Goal #1 Patient is able to demonstrate supine - sit EOB @ level: modified independent     Goal #1   Current Status NT received in the chair with OT present    Goal #2 Patient is able to demonstrate transfers Sit to/from Stand at assistance level: modified independent     Goal #2  Current Status CGA with the RW   Goal #3 Patient is able to ambulate 300 feet with assistive device at assistance level: modified independent    Goal #3   Current Status 100' with the RW CGA/SBA    Goal #4    Goal #4   Current Status    Goal #5  AROM 0 degrees extension to 95 degrees flexion     Goal #5   Current Status IN PROGRESS   Goal #6 Patient independently performs home exercise program for ROM/strengthening per the instructions provided in preparation for discharge.   Goal #6  Current Status IN PROGRESS       Therapeutic Activity: 23 minutes

## 2024-08-25 NOTE — PROGRESS NOTES
Subjective: No complaints. Pain improved, but still poorly controlled.    Objective:  Patient Vitals for the past 24 hrs:   BP Temp Temp src Pulse Resp SpO2   08/25/24 0858 139/65 97.2 °F (36.2 °C) Temporal 95 18 98 %   08/25/24 0525 144/75 98.6 °F (37 °C) Oral 70 18 96 %   08/24/24 2150 -- -- -- -- -- 96 %   08/24/24 2107 132/62 97.9 °F (36.6 °C) Oral 66 18 91 %   08/24/24 1700 124/60 98.7 °F (37.1 °C) Oral 85 16 98 %     Sitting in bed in no acute distress.   Dressing is clean, dry, and intact.  Foot light touch sensation is intact.  Ankle dorsiflexion strength is 5/5. Ankle plantarflexion strength is 5/5.  No calf tenderness.  No calf swelling.  Herlinda's sign is negative.   The lower/upper extremity is neurovascularly intact.   The lower/upper extremity compartments are supple and non-tender.   Lab Results   Component Value Date    WBC 9.1 08/25/2024    RBC 3.22 (L) 08/25/2024    HGB 10.1 (L) 08/25/2024    MCV 93.2 08/25/2024    MCH 31.4 08/25/2024    MCHC 33.7 08/25/2024    RDW 12.9 08/25/2024     No results found for: \"INR\"    Assessment:  Postoperative day #2   S/p LTKA, poor postoperative pain control    Plan:      Continue pain control.  Continue DVT prophylaxis.  Continue PT/OT.  D/C planning:  home tomorrow if pain improves    YANIV GUERRERO MD

## 2024-08-25 NOTE — PLAN OF CARE
Zaida is taking Tylenol as needed for pain. She prefers that to the [place]. Ambulates with walker and 1 assist to bathroom to void.  Needs reminders about transferring from bed to chair. SCD's and ASA for DVT prevention. Discharge plan  is home with home health.  Problem: Patient Centered Care  Goal: Patient preferences are identified and integrated in the patient's plan of care  Description: Interventions:  - What would you like us to know as we care for you? Patient lives alone in an independent living facility and she has a son who is her support system.  - Provide timely, complete, and accurate information to patient/family  - Incorporate patient and family knowledge, values, beliefs, and cultural backgrounds into the planning and delivery of care  - Encourage patient/family to participate in care and decision-making at the level they choose  - Honor patient and family perspectives and choices  Outcome: Progressing     Problem: Patient/Family Goals  Goal: Patient/Family Long Term Goal  Description: Patient's Long Term Goal: Patient will be able to walk independently with walker and will have no complications from surgery.    Interventions:  - Up with walker as much as tolerated, WBAT to the left leg.  - Monitor incision for any signs of infection or bleeding.  - Pain management with oral medication.  - Take oral anticoagulation to prevent blood clots.  - May ice incision to prevent swelling or help reduce pain.    - [facility] PT to follow.  - Follow up with surgery as recommended.  - See additional Care Plan goals for specific interventions  Outcome: Progressing  Goal: Patient/Family Short Term Goal  Description: Patient's Short Term Goal: Home when stable.    Interventions:   - Up with walker as much as tolerated, WBAT to the left leg.  - Monitor incision for any signs of infection or bleeding.  - Pain management with oral medication.  - Administer oral anticoagulation and apply SCD's to both legs to prevent blood  clots.  - May ice incision to prevent swelling or help reduce pain.   - PT/OT as ordered.   - Monitor labs and VS as ordered and refer as needed.   - See additional Care Plan goals for specific interventions  Outcome: Progressing     Problem: PAIN - ADULT  Goal: Verbalizes/displays adequate comfort level or patient's stated pain goal  Description: INTERVENTIONS:  - Encourage pt to monitor pain and request assistance  - Assess pain using appropriate pain scale  - Administer analgesics based on type and severity of pain and evaluate response  - Implement non-pharmacological measures as appropriate and evaluate response  - Consider cultural and social influences on pain and pain management  - Manage/alleviate anxiety  - Utilize distraction and/or relaxation techniques  - Monitor for opioid side effects  - Notify MD/LIP if interventions unsuccessful or patient reports new pain  - Anticipate increased pain with activity and pre-medicate as appropriate  Outcome: Progressing     Problem: RISK FOR INFECTION - ADULT  Goal: Absence of fever/infection during anticipated neutropenic period  Description: INTERVENTIONS  - Monitor WBC  - Administer growth factors as ordered  - Implement neutropenic guidelines  Outcome: Progressing     Problem: SAFETY ADULT - FALL  Goal: Free from fall injury  Description: INTERVENTIONS:  - Assess pt frequently for physical needs  - Identify cognitive and physical deficits and behaviors that affect risk of falls.  - [place] fall precautions as indicated by assessment.  - Educate pt/family on patient safety including physical limitations  - Instruct pt to call for assistance with activity based on assessment  - Modify environment to reduce risk of injury  - Provide assistive devices as appropriate  - Consider OT/PT consult to assist with strengthening/mobility  - Encourage toileting schedule  Outcome: Progressing     Problem: DISCHARGE PLANNING  Goal: Discharge to home or other facility with  appropriate resources  Description: INTERVENTIONS:  - Identify barriers to discharge w/pt and caregiver  - Include patient/family/discharge partner in discharge planning  - Arrange for needed discharge resources and transportation as appropriate  - Identify discharge learning needs (meds, wound care, etc)  - Arrange for interpreters to assist at discharge as needed  - Consider post-discharge preferences of patient/family/discharge partner  - Complete POLST form as appropriate  - Assess patient's ability to be responsible for managing their own health  - Refer to Case Management Department for coordinating discharge planning if the patient needs post-hospital services based on physician/LIP order or complex needs related to functional status, cognitive ability or social support system  Outcome: Progressing     Problem: GENITOURINARY - ADULT  Goal: Absence of urinary retention  Description: INTERVENTIONS:  - Assess patient’s ability to void and empty bladder  - Monitor intake/output and perform bladder scan as needed  - Follow urinary retention protocol/standard of care  - Consider collaborating with pharmacy to review patient's medication profile  - Implement strategies to promote bladder emptying  Outcome: Progressing     Problem: SKIN/TISSUE INTEGRITY - ADULT  Goal: Incision(s), wounds(s) or drain site(s) healing without S/S of infection  Description: INTERVENTIONS:  - Assess and document risk factors for pressure ulcer development  - Assess and document skin integrity  - Assess and document dressing/incision, wound bed, drain sites and surrounding tissue  - Implement wound care per orders  - Initiate isolation precautions as appropriate  - Initiate Pressure Ulcer prevention bundle as indicated  Outcome: Progressing     Problem: MUSCULOSKELETAL - ADULT  Goal: Return mobility to safest level of function  Description: INTERVENTIONS:  - Assess patient stability and activity tolerance for standing, transferring and  ambulating w/ or w/o assistive devices  - Assist with transfers and ambulation using safe patient handling equipment as needed  - Ensure adequate protection for wounds/incisions during mobilization  - Obtain PT/OT consults as needed  - Advance activity as appropriate  - Communicate ordered activity level and limitations with patient/family  Outcome: Progressing  Goal: Maintain proper alignment of affected body part  Description: INTERVENTIONS:  - Support and protect limb and body alignment per provider's orders  - Instruct and reinforce with patient and family use of appropriate assistive device and precautions (e.g. spinal or hip dislocation precautions)  Outcome: Progressing

## 2024-08-25 NOTE — PLAN OF CARE
Patient alert and orientated x4. Post-op day #2. Dressing in place to left knee.VSS. SL.  . Tolerating diet. Voiding freely.  SCDs and aspirin for DVT prophylaxis. PRN tramadol given for Pain Up x1  assist and a walker. Encouraged frequent ambulation and use of incentive spirometer. Fall precautions maintained- bed alarm on, bed locked in lowest position, call light and personal belongings within reach, non-skid socks in place to bilateral feet. Frequent rounding by nursing staff. Plan to [facility] home [facility].              Problem: Patient Centered Care  Goal: Patient preferences are identified and integrated in the patient's plan of care  Description: Interventions:  - What would you like us to know as we care for you? Patient lives alone in an independent living facility and she has a son who is her support system.  - Provide timely, complete, and accurate information to patient/family  - Incorporate patient and family knowledge, values, beliefs, and cultural backgrounds into the planning and delivery of care  - Encourage patient/family to participate in care and decision-making at the level they choose  - Honor patient and family perspectives and choices  Outcome: Progressing     Problem: Patient/Family Goals  Goal: Patient/Family Long Term Goal  Description: Patient's Long Term Goal: Patient will be able to walk independently with walker and will have no complications from surgery.    Interventions:  - Up with walker as much as tolerated, WBAT to the left leg.  - Monitor incision for any signs of infection or bleeding.  - Pain management with oral medication.  - Take oral anticoagulation to prevent blood clots.  - May ice incision to prevent swelling or help reduce pain.    - [facility] PT to follow.  - Follow up with surgery as recommended.  - See additional Care Plan goals for specific interventions  Outcome: Progressing  Goal: Patient/Family Short Term Goal  Description: Patient's  Short Term Goal: Home when stable.    Interventions:   - Up with walker as much as tolerated, WBAT to the left leg.  - Monitor incision for any signs of infection or bleeding.  - Pain management with oral medication.  - Administer oral anticoagulation and apply SCD's to both legs to prevent blood clots.  - May ice incision to prevent swelling or help reduce pain.   - PT/OT as ordered.   - Monitor labs and VS as ordered and refer as needed.   - See additional Care Plan goals for specific interventions  Outcome: Progressing     Problem: PAIN - ADULT  Goal: Verbalizes/displays adequate comfort level or patient's stated pain goal  Description: INTERVENTIONS:  - Encourage pt to monitor pain and request assistance  - Assess pain using appropriate pain scale  - Administer analgesics based on type and severity of pain and evaluate response  - Implement non-pharmacological measures as appropriate and evaluate response  - Consider cultural and social influences on pain and pain management  - Manage/alleviate anxiety  - Utilize distraction and/or relaxation techniques  - Monitor for opioid side effects  - Notify MD/LIP if interventions unsuccessful or patient reports new pain  - Anticipate increased pain with activity and pre-medicate as appropriate  Outcome: Progressing     Problem: RISK FOR INFECTION - ADULT  Goal: Absence of fever/infection during anticipated neutropenic period  Description: INTERVENTIONS  - Monitor WBC  - Administer growth factors as ordered  - Implement neutropenic guidelines  Outcome: Progressing     Problem: SAFETY ADULT - FALL  Goal: Free from fall injury  Description: INTERVENTIONS:  - Assess pt frequently for physical needs  - Identify cognitive and physical deficits and behaviors that affect risk of falls.  - [place] fall precautions as indicated by assessment.  - Educate pt/family on patient safety including physical limitations  - Instruct pt to call for assistance with activity based on  assessment  - Modify environment to reduce risk of injury  - Provide assistive devices as appropriate  - Consider OT/PT consult to assist with strengthening/mobility  - Encourage toileting schedule  Outcome: Progressing

## 2024-08-26 VITALS
OXYGEN SATURATION: 93 % | SYSTOLIC BLOOD PRESSURE: 130 MMHG | TEMPERATURE: 99 F | DIASTOLIC BLOOD PRESSURE: 68 MMHG | RESPIRATION RATE: 16 BRPM | HEIGHT: 60 IN | BODY MASS INDEX: 23.01 KG/M2 | WEIGHT: 117.19 LBS | HEART RATE: 77 BPM

## 2024-08-26 PROCEDURE — 99214 OFFICE O/P EST MOD 30 MIN: CPT | Performed by: HOSPITALIST

## 2024-08-26 RX ORDER — TRAMADOL HYDROCHLORIDE 50 MG/1
50 TABLET ORAL EVERY 6 HOURS PRN
Qty: 25 TABLET | Refills: 0 | Status: SHIPPED | OUTPATIENT
Start: 2024-08-26

## 2024-08-26 NOTE — PLAN OF CARE
Zaida ambulates to bathroom with 1 assist and walker to void.  Taking Tramadol as needed for pain. Pain has been a little higher tonight but she also seems to be doing more to help herself ambulate. Discharge plan is for home with home health when cleared by PT and medical.  Problem: Patient Centered Care  Goal: Patient preferences are identified and integrated in the patient's plan of care  Description: Interventions:  - What would you like us to know as we care for you? Patient lives alone in an independent living facility and she has a son who is her support system.  - Provide timely, complete, and accurate information to patient/family  - Incorporate patient and family knowledge, values, beliefs, and cultural backgrounds into the planning and delivery of care  - Encourage patient/family to participate in care and decision-making at the level they choose  - Honor patient and family perspectives and choices  Outcome: Progressing     Problem: Patient/Family Goals  Goal: Patient/Family Long Term Goal  Description: Patient's Long Term Goal: Patient will be able to walk independently with walker and will have no complications from surgery.    Interventions:  - Up with walker as much as tolerated, WBAT to the left leg.  - Monitor incision for any signs of infection or bleeding.  - Pain management with oral medication.  - Take oral anticoagulation to prevent blood clots.  - May ice incision to prevent swelling or help reduce pain.    - [facility] PT to follow.  - Follow up with surgery as recommended.  - See additional Care Plan goals for specific interventions  Outcome: Progressing  Goal: Patient/Family Short Term Goal  Description: Patient's Short Term Goal: Home when stable.    Interventions:   - Up with walker as much as tolerated, WBAT to the left leg.  - Monitor incision for any signs of infection or bleeding.  - Pain management with oral medication.  - Administer oral anticoagulation and apply SCD's to both legs to  prevent blood clots.  - May ice incision to prevent swelling or help reduce pain.   - PT/OT as ordered.   - Monitor labs and VS as ordered and refer as needed.   - See additional Care Plan goals for specific interventions  Outcome: Progressing     Problem: PAIN - ADULT  Goal: Verbalizes/displays adequate comfort level or patient's stated pain goal  Description: INTERVENTIONS:  - Encourage pt to monitor pain and request assistance  - Assess pain using appropriate pain scale  - Administer analgesics based on type and severity of pain and evaluate response  - Implement non-pharmacological measures as appropriate and evaluate response  - Consider cultural and social influences on pain and pain management  - Manage/alleviate anxiety  - Utilize distraction and/or relaxation techniques  - Monitor for opioid side effects  - Notify MD/LIP if interventions unsuccessful or patient reports new pain  - Anticipate increased pain with activity and pre-medicate as appropriate  Outcome: Progressing     Problem: RISK FOR INFECTION - ADULT  Goal: Absence of fever/infection during anticipated neutropenic period  Description: INTERVENTIONS  - Monitor WBC  - Administer growth factors as ordered  - Implement neutropenic guidelines  Outcome: Progressing     Problem: SAFETY ADULT - FALL  Goal: Free from fall injury  Description: INTERVENTIONS:  - Assess pt frequently for physical needs  - Identify cognitive and physical deficits and behaviors that affect risk of falls.  - [place] fall precautions as indicated by assessment.  - Educate pt/family on patient safety including physical limitations  - Instruct pt to call for assistance with activity based on assessment  - Modify environment to reduce risk of injury  - Provide assistive devices as appropriate  - Consider OT/PT consult to assist with strengthening/mobility  - Encourage toileting schedule  Outcome: Progressing     Problem: DISCHARGE PLANNING  Goal: Discharge to home or other  facility with appropriate resources  Description: INTERVENTIONS:  - Identify barriers to discharge w/pt and caregiver  - Include patient/family/discharge partner in discharge planning  - Arrange for needed discharge resources and transportation as appropriate  - Identify discharge learning needs (meds, wound care, etc)  - Arrange for interpreters to assist at discharge as needed  - Consider post-discharge preferences of patient/family/discharge partner  - Complete POLST form as appropriate  - Assess patient's ability to be responsible for managing their own health  - Refer to Case Management Department for coordinating discharge planning if the patient needs post-hospital services based on physician/LIP order or complex needs related to functional status, cognitive ability or social support system  Outcome: Progressing     Problem: GENITOURINARY - ADULT  Goal: Absence of urinary retention  Description: INTERVENTIONS:  - Assess patient’s ability to void and empty bladder  - Monitor intake/output and perform bladder scan as needed  - Follow urinary retention protocol/standard of care  - Consider collaborating with pharmacy to review patient's medication profile  - Implement strategies to promote bladder emptying  Outcome: Progressing     Problem: SKIN/TISSUE INTEGRITY - ADULT  Goal: Incision(s), wounds(s) or drain site(s) healing without S/S of infection  Description: INTERVENTIONS:  - Assess and document risk factors for pressure ulcer development  - Assess and document skin integrity  - Assess and document dressing/incision, wound bed, drain sites and surrounding tissue  - Implement wound care per orders  - Initiate isolation precautions as appropriate  - Initiate Pressure Ulcer prevention bundle as indicated  Outcome: Progressing     Problem: MUSCULOSKELETAL - ADULT  Goal: Return mobility to safest level of function  Description: INTERVENTIONS:  - Assess patient stability and activity tolerance for standing,  transferring and ambulating w/ or w/o assistive devices  - Assist with transfers and ambulation using safe patient handling equipment as needed  - Ensure adequate protection for wounds/incisions during mobilization  - Obtain PT/OT consults as needed  - Advance activity as appropriate  - Communicate ordered activity level and limitations with patient/family  Outcome: Progressing  Goal: Maintain proper alignment of affected body part  Description: INTERVENTIONS:  - Support and protect limb and body alignment per provider's orders  - Instruct and reinforce with patient and family use of appropriate assistive device and precautions (e.g. spinal or hip dislocation precautions)  Outcome: Progressing

## 2024-08-26 NOTE — PLAN OF CARE
Patient alert and orientated x4- with some forgetfulness. Post-op day #3. Dressing in place to left knee. VSS. RA. Tolerating diet. Voiding freely. SCDs on for DVT prophylaxis. PRN tramadol given for pain. Up with x1 assist and a walker. Encouraged frequent ambulation and use of incentive spirometer. Fall precautions maintained- bed alarm on, bed locked in lowest position, call light and personal belongings within reach, non-skid socks in place to bilateral feet. Frequent rounding by nursing staff. Plan to discharge home when cleared by PT and all services.       Patient cleared by internal medicine, ortho surgery, PT/OT, and social work. Going home with home health. Verified that pain medications are at patient's pharmacy. IV removed, discharge education provided, patient sent home with all personal belongings, and discharge instructions. Addressed additional questions.               Problem: Patient Centered Care  Goal: Patient preferences are identified and integrated in the patient's plan of care  Description: Interventions:  - What would you like us to know as we care for you? Patient lives alone in an independent living facility and she has a son who is her support system.  - Provide timely, complete, and accurate information to patient/family  - Incorporate patient and family knowledge, values, beliefs, and cultural backgrounds into the planning and delivery of care  - Encourage patient/family to participate in care and decision-making at the level they choose  - Honor patient and family perspectives and choices  Outcome: Adequate for Discharge     Problem: Patient/Family Goals  Goal: Patient/Family Long Term Goal  Description: Patient's Long Term Goal: Patient will be able to walk independently with walker and will have no complications from surgery.    Interventions:  - Up with walker as much as tolerated, WBAT to the left leg.  - Monitor incision for any signs of infection or bleeding.  - Pain management  with oral medication.  - Take oral anticoagulation to prevent blood clots.  - May ice incision to prevent swelling or help reduce pain.    - [facility] PT to follow.  - Follow up with surgery as recommended.  - See additional Care Plan goals for specific interventions  Outcome: Adequate for Discharge  Goal: Patient/Family Short Term Goal  Description: Patient's Short Term Goal: Home when stable.    Interventions:   - Up with walker as much as tolerated, WBAT to the left leg.  - Monitor incision for any signs of infection or bleeding.  - Pain management with oral medication.  - Administer oral anticoagulation and apply SCD's to both legs to prevent blood clots.  - May ice incision to prevent swelling or help reduce pain.   - PT/OT as ordered.   - Monitor labs and VS as ordered and refer as needed.   - See additional Care Plan goals for specific interventions  Outcome: Adequate for Discharge     Problem: PAIN - ADULT  Goal: Verbalizes/displays adequate comfort level or patient's stated pain goal  Description: INTERVENTIONS:  - Encourage pt to monitor pain and request assistance  - Assess pain using appropriate pain scale  - Administer analgesics based on type and severity of pain and evaluate response  - Implement non-pharmacological measures as appropriate and evaluate response  - Consider cultural and social influences on pain and pain management  - Manage/alleviate anxiety  - Utilize distraction and/or relaxation techniques  - Monitor for opioid side effects  - Notify MD/LIP if interventions unsuccessful or patient reports new pain  - Anticipate increased pain with activity and pre-medicate as appropriate  Outcome: Adequate for Discharge     Problem: RISK FOR INFECTION - ADULT  Goal: Absence of fever/infection during anticipated neutropenic period  Description: INTERVENTIONS  - Monitor WBC  - Administer growth factors as ordered  - Implement neutropenic guidelines  Outcome: Adequate for Discharge     Problem: SAFETY ADULT  - FALL  Goal: Free from fall injury  Description: INTERVENTIONS:  - Assess pt frequently for physical needs  - Identify cognitive and physical deficits and behaviors that affect risk of falls.  - [place] fall precautions as indicated by assessment.  - Educate pt/family on patient safety including physical limitations  - Instruct pt to call for assistance with activity based on assessment  - Modify environment to reduce risk of injury  - Provide assistive devices as appropriate  - Consider OT/PT consult to assist with strengthening/mobility  - Encourage toileting schedule  Outcome: Adequate for Discharge     Problem: DISCHARGE PLANNING  Goal: Discharge to home or other facility with appropriate resources  Description: INTERVENTIONS:  - Identify barriers to discharge w/pt and caregiver  - Include patient/family/discharge partner in discharge planning  - Arrange for needed discharge resources and transportation as appropriate  - Identify discharge learning needs (meds, wound care, etc)  - Arrange for interpreters to assist at discharge as needed  - Consider post-discharge preferences of patient/family/discharge partner  - Complete POLST form as appropriate  - Assess patient's ability to be responsible for managing their own health  - Refer to Case Management Department for coordinating discharge planning if the patient needs post-hospital services based on physician/LIP order or complex needs related to functional status, cognitive ability or social support system  Outcome: Adequate for Discharge

## 2024-08-26 NOTE — DISCHARGE SUMMARY
[place] Hospitalist Discharge Summary   Patient ID:  Zaida Osuna  [id number]  73 year old  8/28/1950    Admit date: 8/23/2024  Discharge date: 8/26/2024  Primary Care Physician: KEMAR JAY MD   Attending Physician: Meseret Giordano MD   Consults:   Consultants         Provider   Role Specialty     Debbie Hatfield MD      Consulting Physician HOSPITALIST            Discharge Diagnoses:   Primary osteoarthritis of left knee    Reason for admission  Copied from admission H&P: please refer to preop H&P     Hospital Course:  L knee primary osteoarthritis   S/p L total knee arthroplasty 8/23   Orthopedic surgery on consult  PT/OT - home PT   ASA for DVT prevention per surgery  tramadol PRn   Stop oxycontin   Acute blood loss anemia, expected postop   Hb preop 12.5 now 10  EBL with surgery 100mL   H/H better. Monitor   Essential HTN   Amlodipine  Hyperlipidemia   Lipitor     EXAM:   GENERAL: no apparent distress, comfortable  NEURO: A/A Ox3, no focal deficits  RESP: non labored, CTAB/L  CARDIO: Regular, no murmur  ABD: soft, NT, ND  EXTREMITIES: no edema, no calf tenderness    Operative Procedures: Procedure(s) (LRB):  Left total knee arthroplasty with patient specific instruments (Left)    Discharge Instructions     Medication List        START taking these medications      aspirin 325 MG Tabs  Take 1 tablet (325 mg total) by mouth 2 (two) times daily.     traMADol 50 MG Tabs  Commonly known as: Ultram  Take 1 tablet (50 mg total) by mouth every 6 (six) hours as needed.            CONTINUE taking these medications      acetaminophen 500 MG Tabs  Commonly known as: Tylenol Extra Strength     albuterol 108 (90 Base) MCG/ACT Aers  Commonly known as: Ventolin HFA  Inhale 2 puffs into the lungs every 4 (four) hours as needed for Wheezing or Shortness of Breath (cough).     amLODIPine 2.5 MG Tabs  Commonly known as: Norvasc     atorvastatin 20 MG Tabs  Commonly known as: Lipitor     Cholecalciferol 50 MCG (2000 UT)  Caps     MIRALAX OR               Where to Get Your Medications        These medications were sent to [id number] - [place], [place] - [address] AT [facility] [address], [phone], [phone]  [address], [facility] [address]      Phone: [phone]   aspirin 325 MG Tabs  traMADol 50 MG Tabs         Activity: activity as tolerated  Diet: regular diet  Wound Care: NA  Code Status: No Order        Discharge Instructions         Norco or tylenol for pain.  Aspirin 325mg twice daily for 3 weeks for blood clot prevention.  May remove dressing and place new gauze or mepilex over incision in 4 days.  Keep incision clean and dry.  Ice and elevate knee.  Weight bear as tolerated.  Use walker/crutches for comfort.  Follow up with Dr. Lees/Ladonna Bartlett in 2 weeks [phone].     [employer] Driven Van by: [facility]  Next Steps:  Call [phone] to schedule an appointment.  About: [facility] has partnered with [employer] to offer a patient transportation service for patients who need help with transportation from home to the hospital for medical visits. The service provides transportation to patients who live in communities surrounding the hospital campus.    This program provides:    - Transportation for Healthcare    An experienced  will provide service from a patient’s door and with stops at the Main Entrance of [facility], the [facility], the [facility], and the [facility] in [place]. The [employer] van is available for patients who live in a 7-10 mile radius of the hospital.    Oxygen can be transported if properly secured. Patients must have their own oxygen. Let the service know at the time the appointment is scheduled so that mounting equipment can be provided.    This service costs $5 for one-way and $10 for two-way.    Eligibility: Must not being a patient  that is being discharged from the hospital. Patients must be ambulatory or wheelchair independent. This program serves individuals north of the hospital to [facility] including [place], East to [place], South to [facility] including [place] excluding [facility], and West to [place]/[facility]/[address] including [place].    Nearest location: 2.08 miles away.  [facility]  [address]  [place], [place] [id number] [phone]  Hours:  Monday:07:00 AM - 05:00 PM  Tuesday:07:00 AM - 05:00 PM  Wednesday:07:00 AM - 05:00 PM  Thursday:07:00 AM - 05:00 PM  Friday:07:00 AM - 05:00 PM  Senior Rides by: [facility]  Next Steps:    Call [phone] to schedule an appointment.    About: [facility] offers rides for seniors in our community for a nominal fee. The availability of transportation enables seniors to live independently and helps prevent isolation.    Services include:    - Transportation    It is our goal to ensure access to essential services such as medical care and grocery shopping in the safest, most cost-effective ways possible. Seniors can be driven within the [facility] boundaries for medical appointments, their shopping needs, and any other appointments they might have.    Due to the overwhelming demand for our transportation services, we are now requiring a lead time of at least 5 business days in advance to make a reservation.    Eligibility: This program helps people who are older than 54 years old. Must reside in [facility]. This includes all of [place], [place], [place], [place], [place], and small sections of [place], [place], [place], and [place].    Nearest location: 2.46 miles away.  [facility]  [address] [place], [place] [id number] [phone]  Hours:  Monday:08:00 AM - 05:00 PM  Tuesday:08:00 AM - 05:00 PM  Wednesday:08:00 AM - 05:00 PM  Thursday:08:00 AM - 05:00 PM  Friday:08:00 AM -  05:00 PM  Rideshare Transportation by: [facility]  Next Steps:    Call [phone] to register.    About: [facility] helps seniors live independently in the community. They help to schedule and arrange escorted volunteer rideshare transportation to medical appointments, therapies, grocery shopping and other necessary errands to sustain independent living.    This program provides:    - Rideshare transportation    Additional services include:    - Tri-annual newsletter  - Facilitation of interaction between congregations, the communities and social service organizations  - Friendly visitation  - Library homebound delivery service    Eligibility: Their service area includes: [place], [place], [place], [place], [facility], [place], [place], [place], [place], [place], [facility], [place], [place], [place], [place], [place], [place], [place], [place], [place], and [place]. This program helps people who are older than 59 years old.    Nearest location: 4.45 miles away.  [address] [address]   [phone]  Hours:  Monday:08:30 AM - 02:30 PM  Tuesday:08:30 AM - 02:30 PM  Wednesday:08:30 AM - 02:30 PM  Thursday:08:30 AM - 02:30 PM  Friday:08:30 AM - 02:30 PM    Sometimes managing your health at home requires assistance.  The [facility] team has recognized your preference to use [facility].  They can be reached by phone at [phone].  The fax number for your reference is [phone].  A representative from the home health agency will contact you or your family to schedule your first visit.             Important follow up:   Follow-up Information       Gilberto Lees MD. Schedule an appointment as soon as possible for a visit in 2 week(s).    Specialty: SURGERY, ORTHOPEDIC  Why: Surgery follow up  Contact  information:  [address]  [facility] [address]  [phone]               Marcelle Brown MD. Schedule an appointment as soon as possible for a visit.    Specialties: Family Practice, Geriatrics  Why: As needed  Contact information:  [address] [facility] [address]  [phone]                             -PCP in [] within 7 days [] within 14 days [] other     Disposition: home  Discharged Condition: good    Hospital Discharge Diagnoses:  L knee OA    Lace+ Score: 26  59-90 High Risk  29-58 Medium Risk  0-28   Low Risk.    TCM Follow-Up Recommendation:  LACE < 29: Low Risk of readmission after discharge from the hospital. No TCM follow-up needed.            Total Time Coordinating Care: Greater than 30 minutes    Patient had opportunity to ask questions, state understanding, and agree with therapeutic plan as outlined    Meseret Giordano MD  Hospitalist  8/26/2024

## 2024-08-26 NOTE — PROGRESS NOTES
Subjective: No complaints. Pain controlled.    Objective:  Patient Vitals for the past 24 hrs:   BP Temp Temp src Pulse Resp SpO2   08/26/24 1647 130/68 -- -- -- 16 --   08/26/24 1644 122/62 -- -- 77 16 93 %   08/26/24 1643 118/60 -- -- 86 16 97 %   08/26/24 1523 116/61 98.5 °F (36.9 °C) Oral 68 16 94 %   08/26/24 1328 118/65 -- Oral 85 16 96 %   08/26/24 1104 93/65 -- -- 85 -- --   08/26/24 0932 94/68 96.6 °F (35.9 °C) Oral 83 17 96 %   08/26/24 0441 120/62 98.4 °F (36.9 °C) Oral 71 16 93 %   08/25/24 1933 139/68 99.2 °F (37.3 °C) Oral 79 17 95 %     Sitting in bed in no acute distress.   Dressing is clean, dry, and intact.  Foot light touch sensation is intact.  Ankle dorsiflexion strength is 5/5. Ankle plantarflexion strength is 5/5.  No calf tenderness.  No calf swelling.  Herlinda's sign is negative.   The lower/upper extremity is neurovascularly intact.   The lower/upper extremity compartments are supple and non-tender.   Lab Results   Component Value Date    WBC 9.1 08/25/2024    RBC 3.22 (L) 08/25/2024    HGB 10.1 (L) 08/25/2024    MCV 93.2 08/25/2024    MCH 31.4 08/25/2024    MCHC 33.7 08/25/2024    RDW 12.9 08/25/2024     No results found for: \"INR\"    Assessment:  Postoperative day # 3  S/p L TKA    Plan:      Continue pain control.  Continue DVT prophylaxis.  Continue PT/OT.  D/C planning:  home with home health care today    YANIV GUERRERO MD

## 2024-08-26 NOTE — PHYSICAL THERAPY NOTE
PHYSICAL THERAPY KNEE TREATMENT NOTE - INPATIENT     Room Number: 423/423-A             Presenting Problem: L TKA  Co-Morbidities : OA    Problem List  Principal Problem:    Primary osteoarthritis of left knee  Active Problems:    Mixed hyperlipidemia    Essential hypertension      PHYSICAL THERAPY ASSESSMENT   Patient demonstrates good  progress this session, goals  remain in progress.    Patient is requiring stand-by assist as a result of the following impairments: decreased functional strength, decreased endurance/aerobic capacity, pain, and decreased muscular endurance.     Patient continues to function below baseline with bed mobility, transfers, gait, and standing prolonged periods.  Next session anticipate patient to progress bed mobility, transfers, gait, and standing prolonged periods.  Physical Therapy will continue to follow patient for duration of hospitalization.    Patient continues to benefit from continued skilled PT services: at discharge to promote prior level of function and safety with additional support and return home with home health PT.    PLAN  PT Treatment Plan: Bed mobility;Body mechanics;Coordination;Endurance;Energy conservation;Patient education;Gait training;Range of motion;Strengthening;Transfer training;Balance training  Frequency (Obs): Daily    SUBJECTIVE  Pt was agreeable to therapy session.         OBJECTIVE  Precautions: Limb alert - left    WEIGHT BEARING STATUS           L Lower Extremity: Weight Bearing as Tolerated    PAIN ASSESSMENT   Rating:  (did not rate)  Location: L knee  Management Techniques: Activity promotion;Body mechanics;Relaxation;Repositioning    BALANCE  Static Sitting: Good  Dynamic Sitting: Fair +  Static Standing: Fair  Dynamic Standing: Fair -    ACTIVITY TOLERANCE                         O2 WALK       AM-PAC '6-Clicks' INPATIENT SHORT FORM - BASIC MOBILITY  How much difficulty does the patient currently have...  Patient Difficulty: Turning over in bed  (including adjusting bedclothes, sheets and blankets)?: A Little   Patient Difficulty: Sitting down on and standing up from a chair with arms (e.g., wheelchair, bedside commode, etc.): A Little   Patient Difficulty: Moving from lying on back to sitting on the side of the bed?: A Little   How much help from another person does the patient currently need...   Help from Another: Moving to and from a bed to a chair (including a wheelchair)?: A Little   Help from Another: Need to walk in hospital room?: A Little   Help from Another: Climbing 3-5 steps with a railing?: A Little     AM-PAC Score:  Raw Score: 18   Approx Degree of Impairment: 46.58%   Standardized Score (AM-PAC Scale): 43.63   CMS Modifier (G-Code): CK    FUNCTIONAL ABILITY STATUS  Functional Mobility/Gait Assessment  Gait Assistance: Supervision  Distance (ft): 100'  Assistive Device: Rolling walker  Pattern: L Decreased stance time  Rolling: stand-by assist  Supine to Sit: stand-by assist  Sit to Supine:  NT  Sit to Stand: stand-by assist    Skilled Therapy Provided: Pt is received in the bed and was cleared for therapy session. Pt is SBA with bed mobility and to transfer to the EOB. Pt required extra time to perform.  Pt sat EOB for a few minutes and denied any dizziness and light headedness. Pt is SBA with sit<>stand transfers with the RW. Pt required cueing for proper hand placement for safety. Pt was able to AMB about 100' with the RW SBA for balance and safety. Pt with decreased lisa and step length with narrow MIREYA but with good balance and safety awareness. Returned pt back to the room and to the bathroom per pt request. Pt is left in the bathroom with call light within reach. Pt is on track to dc to home once medically cleared. Reported to the RN on the status of the pt.     The patient's Approx Degree of Impairment: 46.58% has been calculated based on documentation in the [facility] '6 clicks' Inpatient Basic Mobility Short Form.  Research supports  that patients with this level of impairment may benefit from Home with [facility] and assist.  Final disposition will be made by interdisciplinary medical team.        Knee ROM                 Patient End of Session: Needs met;Call light within reach;RN aware of session/findings;All patient questions and concerns addressed;In bathroom - nursing staff aware    CURRENT GOALS  Goals to be met by: 8/31/24  Patient Goal Patient's self-stated goal is: less pain   Goal #1 Patient is able to demonstrate supine - sit EOB @ level: modified independent     Goal #1   Current Status SBA    Goal #2 Patient is able to demonstrate transfers Sit to/from Stand at assistance level: modified independent     Goal #2  Current Status SBA  with the RW   Goal #3 Patient is able to ambulate 300 feet with assistive device at assistance level: modified independent    Goal #3   Current Status 100' with the RW SBA    Goal #4    Goal #4   Current Status    Goal #5  AROM 0 degrees extension to 95 degrees flexion     Goal #5   Current Status IN PROGRESS   Goal #6 Patient independently performs home exercise program for ROM/strengthening per the instructions provided in preparation for discharge.   Goal #6  Current Status IN PROGRESS/ HEP handout issued        Therapeutic Activity: 23 minutes

## 2024-08-28 ENCOUNTER — TELEPHONE (OUTPATIENT)
Dept: ORTHOPEDICS CLINIC | Facility: CLINIC | Age: 74
End: 2024-08-28

## 2024-08-28 NOTE — TELEPHONE ENCOUNTER
Post-op call for Dr. Haddad    Date: 8/28/2024      Time: 12:11 PM   Patient: Zaida CASIANO number: [id number]   Surgery and surgery date:8/23/2024  Left total knee arthroplasty with patient specific instruments   Patient unavailable.  Left message on voicemail to call clinic with questions or concerns.  Number was provided./ Caroline GRADYPOKE TO PATIENT/ Marilee RN spoke to patient  today also  Patient's general feeling since discharge:I got to get my act together. Worrying about blood clots and the swelling. Not eating hardly anything since leaving the hospital. There are people in the community to help her.   Pain control (0-10):3-7  Pain Medication:  dose/strength/taMADol 50 MG Oral Tab   Medication Quantity Refills Start End   aspirin 325 MG Oral Tab       She will start using advil 600mg alternating with Tramadol. She has been using tramadol consistently every 6 hours  Fever: yes no  Chills:  no  SOB: no  Incision site appearance:  Redness  no  Drainage no  Clean/dry/Intact yes   Calf pain, redness or warmth:  no   Bowel Regimen: no   If not, what are you taking stool softener/laxative?/ It hd been a week since she had a stool. I gave her fresh fruit with fiber skin, stool softener and mirralax  Confirmed appointment date for post op:9/5/2024  Other concerns patients may ask: I told her to push fluids, vitamin d and c for bone healing.Have a friend get her some food or have some delivered. She lives alone. We went over elevation. She had not been elevating properly and using ice frequent enough  Bathing and bandages   Patient needs to keep incision clean and dry for the first week./DONE  Enforce rest, ice, compression elevation and use their pain medication accordingly./DONE      If the patient needs more pain medication, please put in a communication.

## 2024-08-28 NOTE — TELEPHONE ENCOUNTER
Spoke with patient. Left TKA 8/23/24. Has good pain control but is having swelling in ankles, feet and knees. Pain relegated to surgical knee. Denies pain or tenderness in calves, or elsewhere in legs aside form general surgical area. Went through other dvt s/s and none present. Was not elevating as she was given conflicting instructions and got confused as to what she should be doing. I advised to elevate as that should help with the swelling. She is doing exercises given and ambulating every couple of hours. States and RN came and Physical Therapy will be coming. She did have issues getting her discharge meds but has them as of last night.. There was a gap of about a day in her aspirin intake. I advised it was important for blood clot prevention. She endorses some constipation. I advised and educated on adequate fluids and fresh fruits with skins and vegetables for the fiber and water content. She does have miralax, but I advised that if she is not getting adequate fluids it will not be as effective and hydrating and using whole food fiber is a better option as a first line of therapy as well as ambulation, which she is doing.

## 2024-08-29 ENCOUNTER — HOSPITAL ENCOUNTER (EMERGENCY)
Facility: HOSPITAL | Age: 74
Discharge: HOME | End: 2024-08-29
Attending: EMERGENCY MEDICINE
Payer: MEDICARE

## 2024-08-29 VITALS
OXYGEN SATURATION: 98 % | SYSTOLIC BLOOD PRESSURE: 145 MMHG | HEART RATE: 79 BPM | TEMPERATURE: 97 F | DIASTOLIC BLOOD PRESSURE: 73 MMHG | BODY MASS INDEX: 23 KG/M2 | RESPIRATION RATE: 19 BRPM | WEIGHT: 118 LBS

## 2024-08-29 DIAGNOSIS — Z09 POSTOPERATIVE EXAMINATION: ICD-10-CM

## 2024-08-29 DIAGNOSIS — E86.0 DEHYDRATION: Primary | ICD-10-CM

## 2024-08-29 LAB
ANION GAP SERPL CALC-SCNC: 7 MMOL/L (ref 0–18)
BASOPHILS # BLD AUTO: 0.03 X10(3) UL (ref 0–0.2)
BASOPHILS NFR BLD AUTO: 0.5 %
BUN BLD-MCNC: 11 MG/DL (ref 9–23)
BUN/CREAT SERPL: 16.7 (ref 10–20)
CALCIUM BLD-MCNC: 8.9 MG/DL (ref 8.7–10.4)
CHLORIDE SERPL-SCNC: 105 MMOL/L (ref 98–112)
CO2 SERPL-SCNC: 30 MMOL/L (ref 21–32)
CREAT BLD-MCNC: 0.66 MG/DL
DEPRECATED RDW RBC AUTO: 43 FL (ref 35.1–46.3)
EGFRCR SERPLBLD CKD-EPI 2021: 92 ML/MIN/1.73M2 (ref 60–?)
EOSINOPHIL # BLD AUTO: 0.35 X10(3) UL (ref 0–0.7)
EOSINOPHIL NFR BLD AUTO: 5.6 %
ERYTHROCYTE [DISTWIDTH] IN BLOOD BY AUTOMATED COUNT: 12.4 % (ref 11–15)
GLUCOSE BLD-MCNC: 97 MG/DL (ref 70–99)
HCT VFR BLD AUTO: 27.1 %
HGB BLD-MCNC: 9 G/DL
IMM GRANULOCYTES # BLD AUTO: 0.04 X10(3) UL (ref 0–1)
IMM GRANULOCYTES NFR BLD: 0.6 %
LYMPHOCYTES # BLD AUTO: 1.33 X10(3) UL (ref 1–4)
LYMPHOCYTES NFR BLD AUTO: 21.3 %
MAGNESIUM SERPL-MCNC: 1.9 MG/DL (ref 1.6–2.6)
MCH RBC QN AUTO: 31.5 PG (ref 26–34)
MCHC RBC AUTO-ENTMCNC: 33.2 G/DL (ref 31–37)
MCV RBC AUTO: 94.8 FL
MONOCYTES # BLD AUTO: 0.78 X10(3) UL (ref 0.1–1)
MONOCYTES NFR BLD AUTO: 12.5 %
NEUTROPHILS # BLD AUTO: 3.7 X10 (3) UL (ref 1.5–7.7)
NEUTROPHILS # BLD AUTO: 3.7 X10(3) UL (ref 1.5–7.7)
NEUTROPHILS NFR BLD AUTO: 59.5 %
OSMOLALITY SERPL CALC.SUM OF ELEC: 293 MOSM/KG (ref 275–295)
PLATELET # BLD AUTO: 321 10(3)UL (ref 150–450)
POTASSIUM SERPL-SCNC: 3.4 MMOL/L (ref 3.5–5.1)
RBC # BLD AUTO: 2.86 X10(6)UL
SODIUM SERPL-SCNC: 142 MMOL/L (ref 136–145)
WBC # BLD AUTO: 6.2 X10(3) UL (ref 4–11)

## 2024-08-29 PROCEDURE — 83735 ASSAY OF MAGNESIUM: CPT | Performed by: EMERGENCY MEDICINE

## 2024-08-29 PROCEDURE — 96361 HYDRATE IV INFUSION ADD-ON: CPT

## 2024-08-29 PROCEDURE — 80048 BASIC METABOLIC PNL TOTAL CA: CPT | Performed by: EMERGENCY MEDICINE

## 2024-08-29 PROCEDURE — 85025 COMPLETE CBC W/AUTO DIFF WBC: CPT | Performed by: EMERGENCY MEDICINE

## 2024-08-29 PROCEDURE — 99284 EMERGENCY DEPT VISIT MOD MDM: CPT

## 2024-08-29 PROCEDURE — 96360 HYDRATION IV INFUSION INIT: CPT

## 2024-08-29 RX ORDER — TRAMADOL HYDROCHLORIDE 50 MG/1
50 TABLET ORAL ONCE
Status: COMPLETED | OUTPATIENT
Start: 2024-08-29 | End: 2024-08-29

## 2024-08-29 RX ORDER — BISACODYL 10 MG
10 SUPPOSITORY, RECTAL RECTAL DAILY
Qty: 6 SUPPOSITORY | Refills: 0 | Status: SHIPPED | OUTPATIENT
Start: 2024-08-29 | End: 2024-09-04

## 2024-08-29 RX ORDER — ACETAMINOPHEN 500 MG
1000 TABLET ORAL ONCE
Status: COMPLETED | OUTPATIENT
Start: 2024-08-29 | End: 2024-08-29

## 2024-08-30 ENCOUNTER — TELEPHONE (OUTPATIENT)
Dept: ORTHOPEDICS CLINIC | Facility: CLINIC | Age: 74
End: 2024-08-30

## 2024-08-30 NOTE — TELEPHONE ENCOUNTER
Called [facility] office and Left message to call back.   [facility] was discharged with [facility]. Called residential and Left message to call back.

## 2024-08-30 NOTE — TELEPHONE ENCOUNTER
Reynaldo'd a call from [facility] that states start of care for Home health started on 8/27 with physical therapy and RN is visiting today. Home health RN is Noemi and her contact number is [phone].   Called Noemi and she states she will be visiting patient today. She explains patient went to ER for pain control on 8/29. She will change patient dressing today and if any concerns she will Follow up with the office. Did advise her office closes at 4pm today. She will call back only as needed.

## 2024-08-30 NOTE — TELEPHONE ENCOUNTER
Received a call from Dr Callahan who states patient came to see her on 8/29 for post op visit and to have dressing changed but she kept dressing in place and advised to Follow up with surgeon. Also patient went to ER. She asked that I follow up with patient to confirm post op instructions and Follow up care. Called patient Left message to call back. Also waiting for a call back from [facility].

## 2024-08-30 NOTE — TELEPHONE ENCOUNTER
Nurse Chávez at the primary doctors office states that the patient was seen this morning and would like to know plan of treatment for dressing change and the patient is having a lot of pain after her recent surgery. Nurse Chávez is requesting to speak to the Ortho Nurse for Dr Dorman. I tried to reach the nurse but not available.

## 2024-08-30 NOTE — TELEPHONE ENCOUNTER
Per patient recently had surgery, patient continues to have a lot of pain and is not able to get bandages changed either. Please call thank you.

## 2024-09-03 ENCOUNTER — TELEPHONE (OUTPATIENT)
Dept: ORTHOPEDICS CLINIC | Facility: CLINIC | Age: 74
End: 2024-09-03

## 2024-09-03 DIAGNOSIS — Z96.652 S/P TOTAL KNEE ARTHROPLASTY, LEFT: Primary | ICD-10-CM

## 2024-09-03 NOTE — TELEPHONE ENCOUNTER
Per Gladys wanted to request two additional visits for home health physical therapy. Also wants to make sure outpatient physical therapy order is placed, states [facility] books up pretty quickly and does not want patient to wait. Please call thank you.

## 2024-09-03 NOTE — TELEPHONE ENCOUNTER
Ladonna Bartlett PA-C  [facility] Clinical Staff21 minutes ago (4:07 PM)       Okay to extend home health therapy for two additional visit. I will provide outpatient PT order when I see patient in 2 days

## 2024-09-05 ENCOUNTER — TELEPHONE (OUTPATIENT)
Dept: ORTHOPEDICS CLINIC | Facility: CLINIC | Age: 74
End: 2024-09-05

## 2024-09-05 ENCOUNTER — HOSPITAL ENCOUNTER (OUTPATIENT)
Dept: ULTRASOUND IMAGING | Facility: HOSPITAL | Age: 74
Discharge: HOME | End: 2024-09-05
Payer: MEDICARE

## 2024-09-05 ENCOUNTER — OFFICE VISIT (OUTPATIENT)
Dept: ORTHOPEDICS CLINIC | Facility: CLINIC | Age: 74
End: 2024-09-05

## 2024-09-05 ENCOUNTER — HOSPITAL ENCOUNTER (OUTPATIENT)
Dept: GENERAL RADIOLOGY | Facility: HOSPITAL | Age: 74
Discharge: HOME | End: 2024-09-05
Payer: MEDICARE

## 2024-09-05 DIAGNOSIS — Z47.89 ORTHOPEDIC AFTERCARE: Primary | ICD-10-CM

## 2024-09-05 DIAGNOSIS — M79.662 PAIN OF LEFT CALF: ICD-10-CM

## 2024-09-05 DIAGNOSIS — Z47.89 ORTHOPEDIC AFTERCARE: ICD-10-CM

## 2024-09-05 PROCEDURE — 93971 EXTREMITY STUDY: CPT

## 2024-09-05 PROCEDURE — 73562 X-RAY EXAM OF KNEE 3: CPT

## 2024-09-05 PROCEDURE — 99024 POSTOP FOLLOW-UP VISIT: CPT

## 2024-09-05 RX ORDER — TRAMADOL HYDROCHLORIDE 50 MG/1
50 TABLET ORAL EVERY 6 HOURS PRN
Qty: 20 TABLET | Refills: 0 | Status: SHIPPED | OUTPATIENT
Start: 2024-09-05

## 2024-09-05 NOTE — TELEPHONE ENCOUNTER
Patient s/p left TKA on 8/23/24. First POV today 9/5/24.    S/w patient- Informed her that I am a nurse and can't specifically interpret the results, but I did read her impression that ultrasound was normal examination. Did not have any mention of DVT. She states that steri-strips have fallen off. She states that incision looks good and well approximated. I told her that she does not need to put on any new steri strips on, but she should call us if she has any concerns for the incision.    ALON

## 2024-09-05 NOTE — PROGRESS NOTES
NURSING INTAKE COMMENTS:   Chief Complaint   Patient presents with    Post-Op     1st POV L TKA - Pt is still having pain. States cramps by her chest. Still having any numbness or tingling.         HPI: This 74 year old female presents today with complaints of left knee surgery follow up. She is two weeks postoperative from left total knee arthroplasty. States she is doing well today. She is taking Tramadol twice a day for pain and supplementing with Tylenol and Advil. She is taking ASA BID for DVT prophylaxis. States she is experiencing swelling and pain in the left calf. She has been icing and elevating the leg as well. Ambulating with walker. Denies fever, chills, SOB, or night sweats.     Past Medical History:    Anxiety state    Back problem    neck pain- pressure and tightness    Essential hypertension    High blood pressure    High cholesterol    Hx of motion sickness    IBS (irritable bowel syndrome)    PONV (postoperative nausea and vomiting)    lasts for hours     Past Surgical History:   Procedure Laterality Date    Cataract Left 01/21/2018    Cataract Right 07/31/2018    Hernia surgery  07/11/2016    Hysterectomy  09/08/2020    Total abdom hysterectomy       Current Outpatient Medications   Medication Sig Dispense Refill    traMADol 50 MG Oral Tab Take 1 tablet (50 mg total) by mouth every 6 (six) hours as needed for Pain. No alcohol or driving on this med. Stop if lethargic or hallucinating. 20 tablet 0    traMADol 50 MG Oral Tab Take 1 tablet (50 mg total) by mouth every 6 (six) hours as needed. 25 tablet 0    aspirin 325 MG Oral Tab Take 1 tablet (325 mg total) by mouth 2 (two) times daily. 42 tablet 0    atorvastatin 20 MG Oral Tab Take 1 tablet (20 mg total) by mouth daily.      amLODIPine 2.5 MG Oral Tab Take 1 tablet (2.5 mg total) by mouth daily.      albuterol 108 (90 Base) MCG/ACT Inhalation Aero Soln Inhale 2 puffs into the lungs every 4 (four) hours as needed for Wheezing or Shortness of  Breath (cough). 1 each 3    Polyethylene Glycol 3350 (MIRALAX OR) Take 1 Package by mouth daily as needed (constipation).      acetaminophen 500 MG Oral Tab Take 2 tablets (1,000 mg total) by mouth 2 (two) times daily as needed for Pain.      Cholecalciferol 50 MCG (2000 UT) Oral Cap Take 1 tablet by mouth daily. Vitamin d and k combined       Allergies   Allergen Reactions    Antispasmodic NAUSEA AND VOMITING     pt unsure of reaction    Chlorthalidone DIZZINESS    Belladonna Alk-Phenobarbital UNKNOWN     pt unsure of reaction    Latex RASH     Family History   Problem Relation Age of Onset    Breast Cancer Sister         61-69    Breast Cancer Sister 40    Other (Parkinson's disease) Father     Seizure Disorder Daughter     Ovarian Cancer Neg        Social History     Occupational History    Not on file   Tobacco Use    Smoking status: Never     Passive exposure: Yes    Smokeless tobacco: Never   Vaping Use    Vaping status: Never Used   Substance and Sexual Activity    Alcohol use: Never    Drug use: Never    Sexual activity: Not on file        Review of Systems:  GENERAL: denies fevers, chills, night sweats, fatigue, unintentional weight loss/gain  SKIN: denies skin lesions, open sores, rash  HEENT:denies recent vision change, new nasal congestion,hearing loss, tinnitus, sore throat, headaches  RESPIRATORY: denies new shortness of breath, cough, asthma, wheezing  CARDIOVASCULAR: denies chest pain, leg cramps with exertion, palpitations, leg swelling  GI: denies abdominal pain, nausea, vomiting, diarrhea, constipation, hematochezia, worsening heartburn or stomach ulcers  : denies dysuria, hematuria, incontinence, increased frequency, urgency, difficulty urinating  MUSCULOSKELETAL: denies musculoskeletal complaints other than in HPI  NEURO: denies numbness, tingling, weakness, balance issues, dizziness, memory loss  PSYCHIATRIC: denies Hx of depression, anxiety, other psychiatric disorders  HEMATOLOGIC: denies  blood clots, anemia, blood clotting disorders, blood transfusion  ENDOCRINE: denies autoimmune disease, thyroid issues, or diabetes  ALLERGY: denies asthma, seasonal allergies    Physical Examination:    There were no vitals taken for this visit.  Constitutional: appears well hydrated, alert and responsive, no acute distress noted  Extremities: Left knee incision is dry and intact. There is no erythema or palpable warmth. Moderate calf swelling and diffuse tenderness to palpation of the calf and popliteal fossa. Range of motion from 0 to 100 degrees. Good varus and valgus stability in midflexion. No numbness.   Neurological: Unchanged    Imaging:   XR KNEE (1 OR 2 VIEWS), LEFT (CPT=73560)    Result Date: 8/23/2024  PROCEDURE: XR KNEE (1 OR 2 VIEWS), LEFT (CPT=73560)  COMPARISON: [facility], XR KNEE COMPLETE BILAT EM(CPT=73564-50), 5/11/2024, 12:27 PM.  INDICATIONS: Status post Left total knee arthroplasty.  TECHNIQUE: 2 views were obtained.   FINDINGS:  BONES: Total knee arthroplasty has been performed. Hardware is in gross anatomic alignment. Visualized femur, tibia, patella and fibula are in anatomic alignment without acute fracture. SOFT TISSUES: There is soft tissue inflammation with subcutaneous emphysema and edema, compatible with recent surgery. Skin staples are present ventral to the knee. EFFUSION: There is a knee effusion. OTHER: Negative.          CONCLUSION: Postoperative changes of left knee arthroplasty.   Dictated by (CST): Can Pitt MD on 8/23/2024 at 1:31 PM     Finalized by (CST): Can Pitt MD on 8/23/2024 at 1:32 PM             Labs:  Lab Results   Component Value Date    WBC 6.2 08/29/2024    HGB 9.0 (L) 08/29/2024    .0 08/29/2024      Lab Results   Component Value Date    GLU 97 08/29/2024    BUN 11 08/29/2024    CREATSERUM 0.66 08/29/2024    GFRNAA 78 01/14/2022    GFRAA 90 01/14/2022        Assessment and Plan:  Diagnoses and all orders for this  visit:    Orthopedic aftercare  -     XR KNEE (3 VIEWS), LEFT (CPT=73562); Future  -     traMADol 50 MG Oral Tab; Take 1 tablet (50 mg total) by mouth every 6 (six) hours as needed for Pain. No alcohol or driving on this med. Stop if lethargic or hallucinating.  -     PHYSICAL THERAPY - INTERNAL    Pain of left calf  -     US VENOUS DOPPLER LEG LEFT - DIAG IMG (CPT=93971); Future        Assessment: Healing left total knee arthroplasty     Plan: Bandage and staples removed. Discussed wound care. Steri strips placed over incision, remove any remaining strips in one week. Continue Tramadol for pain, refill sent to pharmacy. Continue Tylenol, icing, and elevation. Complete ASA prescription for DVT prophylaxis. Given swelling and tenderness on physical exam, I ordered a STAT venous doppler ultrasound of the left lower extremity to rule out possible DVT. Physical therapy order provided today. Continue to wean from walker and cane as tolerated.     Follow up in four weeks for second post op visit with x-rays.     Follow Up: Return in about 4 weeks (around 10/3/2024).    Ladonna Bartlett PA-C

## 2024-09-05 NOTE — TELEPHONE ENCOUNTER
Patient asking if doppler test results have come back in. Patient also states the sterry strips placed today have come off but patient is not in pain and asking what to do about strips.Please call.

## 2024-09-06 NOTE — TELEPHONE ENCOUNTER
Spoke to patient and relayed Ladonna PA-C message as shown below. Patient verbalized understanding and had no further questions at this time.

## 2024-09-06 NOTE — TELEPHONE ENCOUNTER
Noted thank you. Results are negative for DVT. No changes to her current treatment plans are needed. Thanks

## 2024-09-09 ENCOUNTER — TELEPHONE (OUTPATIENT)
Dept: ORTHOPEDICS CLINIC | Facility: CLINIC | Age: 74
End: 2024-09-09

## 2024-09-09 NOTE — TELEPHONE ENCOUNTER
Patient called back to provide with FAX number physical therapy order for [facility]      Fax number [phone]   Septic shock 1wk s/p right hemicolectomy

## 2024-09-09 NOTE — TELEPHONE ENCOUNTER
S/w patient and informed her that we could book her appointment with Dr Lees to assess the wrists. She accepted opening on 9/18/24 at 3:45. She had no other questions at this time

## 2024-09-09 NOTE — TELEPHONE ENCOUNTER
S/w patient- Patient is s/p left TKA on 8/23/24. Patient was seen on 7/31/24 prior to surgery with bilateral wrist pain related to carpel tunnel. At that visit, patient had xrays and it was decided that it would be best to wait until after knee surgery to discuss options with the wrist. Patient is interested in getting cortisone injections in the wrist.     Please advise if wrist cortisone would be possibility for patient at this time now that she is almost 3 weeks post-op from knee.

## 2024-09-09 NOTE — TELEPHONE ENCOUNTER
Yes should be fine to have the cortisone injections. I briefly discussed with patient when she was seen in office. She is aware that Dr. Lees is the one who performs the carpal tunnel cortisone injections if indicated.

## 2024-09-09 NOTE — TELEPHONE ENCOUNTER
S/w patient and informed her of restrictions. She states that she will wait until she is fully weaned off of tramadol before starting to drive. I told her that this would be a good idea. She had no other questions at this time

## 2024-09-09 NOTE — TELEPHONE ENCOUNTER
S/w patient- she states that she is almost entirely weaned from tramadol at this time. She is wondering if it would be okay to start driving at this time. PT order faxed to [facility] at provided number below.    S/p Left TKA on 8/23/24    Please advise on driving for patient

## 2024-09-09 NOTE — TELEPHONE ENCOUNTER
Patient stating she has carpal tuner on both hands would like to know if Dr Isaac can provide injection for it?Please advise

## 2024-09-09 NOTE — TELEPHONE ENCOUNTER
Patient calling stating she had a knee replacement with Dr Haddad would like to know if she can drive now? Also would like for physical therapy to be with [facility] patient was not able to provide me with the fax number she will call back with fax number .

## 2024-09-12 ENCOUNTER — TELEPHONE (OUTPATIENT)
Dept: ORTHOPEDICS CLINIC | Facility: CLINIC | Age: 74
End: 2024-09-12

## 2024-09-12 NOTE — TELEPHONE ENCOUNTER
S/w patient- Patient is s/p left TKA on 8/23/24- Patient wanted to talk through pain medication recommendations and what is the best way to control pain. Patient states that she feels like pain is well controlled by taking tylenol 1000mg every 8 hours and taking 1 tramadol a bedtime. I told her that this is a good medication plan. I recommended weaning entirely from the tramadol as pain improves. I also encouraged her to continue to use ice to help with swelling. She was agreeable to plan and had no other questions at this time.    ALON

## 2024-09-12 NOTE — TELEPHONE ENCOUNTER
Patient is asking to review her pain medication tramadol and tylenol with a nurse or Dr. Lees and see if they need to be adjusted. Patient states she is still in a lot of pain. Please call.

## 2024-09-18 ENCOUNTER — OFFICE VISIT (OUTPATIENT)
Dept: ORTHOPEDICS CLINIC | Facility: CLINIC | Age: 74
End: 2024-09-18

## 2024-09-18 DIAGNOSIS — G56.03 BILATERAL CARPAL TUNNEL SYNDROME: Primary | ICD-10-CM

## 2024-09-18 PROCEDURE — 99214 OFFICE O/P EST MOD 30 MIN: CPT | Performed by: ORTHOPAEDIC SURGERY

## 2024-09-18 RX ORDER — POLYETHYLENE GLYCOL 400 AND PROPYLENE GLYCOL 4; 3 MG/ML; MG/ML
2 SOLUTION/ DROPS OPHTHALMIC AS NEEDED
COMMUNITY
Start: 2024-07-23

## 2024-09-18 RX ORDER — CYANOCOBALAMIN 1000 UG/ML
1000 INJECTION, SOLUTION INTRAMUSCULAR; SUBCUTANEOUS ONCE
COMMUNITY
Start: 2023-03-28

## 2024-09-18 NOTE — PROGRESS NOTES
NURSING INTAKE COMMENTS:   Chief Complaint   Patient presents with    Follow - Up     Patient is here to follow up on bilateral wrist. Patient denies any pain. Complains of a lot of discomfort.        HPI: This 74 year old female presents today with complaints of bilateral hand pain and numbness follow-up.  Her left hand bothers her worse than the right.  She has some difficulty sleeping at night due to left hand discomfort.  She has used night splints consistently without significant improvement.  She is also tried oral anti-inflammatories.  She is now 1 month postoperative from her left total knee arthroplasty    Past Medical History:    Anxiety state    Back problem    neck pain- pressure and tightness    Essential hypertension    High blood pressure    High cholesterol    Hx of motion sickness    IBS (irritable bowel syndrome)    PONV (postoperative nausea and vomiting)    lasts for hours     Past Surgical History:   Procedure Laterality Date    Cataract Left 01/21/2018    Cataract Right 07/31/2018    Hernia surgery  07/11/2016    Hysterectomy  09/08/2020    Total abdom hysterectomy       Current Outpatient Medications   Medication Sig Dispense Refill    cyanocobalamin 1000 MCG/ML Injection Solution Inject 1 mL (1,000 mcg total) into the skin once.      polypropylene glycol- (SYSTANE ULTRA) 0.4-0.3 % Ophthalmic Solution Apply 2 drops to eye as needed.      traMADol 50 MG Oral Tab Take 1 tablet (50 mg total) by mouth every 6 (six) hours as needed for Pain. No alcohol or driving on this med. Stop if lethargic or hallucinating. 20 tablet 0    traMADol 50 MG Oral Tab Take 1 tablet (50 mg total) by mouth every 6 (six) hours as needed. 25 tablet 0    aspirin 325 MG Oral Tab Take 1 tablet (325 mg total) by mouth 2 (two) times daily. 42 tablet 0    atorvastatin 20 MG Oral Tab Take 1 tablet (20 mg total) by mouth daily.      amLODIPine 2.5 MG Oral Tab Take 1 tablet (2.5 mg total) by mouth daily.      albuterol 108  (90 Base) MCG/ACT Inhalation Aero Soln Inhale 2 puffs into the lungs every 4 (four) hours as needed for Wheezing or Shortness of Breath (cough). 1 each 3    Polyethylene Glycol 3350 (MIRALAX OR) Take 1 Package by mouth daily as needed (constipation).      acetaminophen 500 MG Oral Tab Take 2 tablets (1,000 mg total) by mouth 2 (two) times daily as needed for Pain.      Cholecalciferol 50 MCG (2000 UT) Oral Cap Take 1 tablet by mouth daily. Vitamin d and k combined       Allergies   Allergen Reactions    Antispasmodic NAUSEA AND VOMITING     pt unsure of reaction    Chlorthalidone DIZZINESS    Belladonna Alk-Phenobarbital UNKNOWN     pt unsure of reaction    Latex RASH     Family History   Problem Relation Age of Onset    Breast Cancer Sister         61-69    Breast Cancer Sister 40    Other (Parkinson's disease) Father     Seizure Disorder Daughter     Ovarian Cancer Neg        Social History     Occupational History    Not on file   Tobacco Use    Smoking status: Never     Passive exposure: Yes    Smokeless tobacco: Never   Vaping Use    Vaping status: Never Used   Substance and Sexual Activity    Alcohol use: Never    Drug use: Never    Sexual activity: Not on file        Review of Systems:  GENERAL: denies fevers, chills, night sweats, fatigue, unintentional weight loss/gain  SKIN: denies skin lesions, open sores, rash  HEENT:denies recent vision change, new nasal congestion,hearing loss, tinnitus, sore throat, headaches  RESPIRATORY: denies new shortness of breath, cough, asthma, wheezing  CARDIOVASCULAR: denies chest pain, leg cramps with exertion, palpitations, leg swelling  GI: denies abdominal pain, nausea, vomiting, diarrhea, constipation, hematochezia, worsening heartburn or stomach ulcers  : denies dysuria, hematuria, incontinence, increased frequency, urgency, difficulty urinating  MUSCULOSKELETAL: denies musculoskeletal complaints other than in HPI  NEURO: denies numbness, tingling, weakness, balance  issues, dizziness, memory loss  PSYCHIATRIC: denies Hx of depression, anxiety, other psychiatric disorders  HEMATOLOGIC: denies blood clots, anemia, blood clotting disorders, blood transfusion  ENDOCRINE: denies autoimmune disease, thyroid issues, or diabetes  ALLERGY: denies asthma, seasonal allergies    Physical Examination:    There were no vitals taken for this visit.  Constitutional: appears well hydrated, alert and responsive, no acute distress noted  Extremities: Left knee incision healing well.  No erythema or palpable warmth.  Mild swelling to the left ankle.  No calf tenderness.    Examination of left hand reveals mild thenar atrophy.  Median nerve compression test and Phalen sign are mildly positive.  2-point discrimination is 6 mm in the thumb index middle finger and 4 mm small finger.  Neurological: Above    Imaging:   XR KNEE (3 VIEWS), LEFT (CPT=73562)    Result Date: 9/5/2024  PROCEDURE: XR KNEE ROUTINE (3 VIEWS), LEFT (CPT=73562)  COMPARISON: [facility], XR KNEE (1 OR 2 VIEWS), LEFT (CPT=73560), 8/23/2024, 1:10 PM.  INDICATIONS: Left knee orthopedic aftercare  TECHNIQUE: Left knee AP lateral weight-bearing and left patellar view.  Partial visualization right knee AP weight-bearing views was obtained.   FINDINGS:  BONES: Recent postop changes following total left knee arthroplasty.  Prosthetic components in anatomic position.  No adverse features. No acute fracture dislocation.  Moderate-severe tricompartment osteoarthritis right knee most pronounced in the lateral femoral tibial joint partially imaged. SOFT TISSUES: Resolving soft tissue emphysema left knee.  Surgical drain has been removed EFFUSION: Mild left knee joint effusion. OTHER: Periarticular soft tissue dystrophic calcifications.         CONCLUSION:  1. Recent left knee total arthroplasty.  Prosthetic components in anatomic position. 2. No acute fracture dislocation.  Mild left knee joint effusion. 3. Moderate-severe  tricompartment osteoarthritis right knee.    Dictated by (CST): Chadwick Lutz MD on 9/05/2024 at 2:46 PM     Finalized by (CST): Chadwick Lutz MD on 9/05/2024 at 2:48 PM          US VENOUS DOPPLER LEG LEFT - DIAG IMG (CPT=93971)    Result Date: 9/5/2024  PROCEDURE: US VENOUS DOPPLER LEG LEFT-DIAG IMG (CPT=93971)  COMPARISON: [facility], [place], US VENOUS DOPPLER LEG BILAT-DIAG IMG (CPT=93970), 6/14/2024, 4:03 PM.  INDICATIONS: Pain of left calf  TECHNIQUE: Color duplex Doppler venous ultrasound of the left lower extremity was performed in the usual manner.  FINDINGS: The femoral and popliteal veins appear normal.  Normal flow was demonstrated with color and pulsed Doppler.  Visualized portions of the great and small saphenous, posterior tibial, and peroneal veins appear normal.   THROMBI: None visible. COMPRESSIBILITY: Normal. OTHER: Negative.         CONCLUSION: Normal examination.     Dictated by (CST): Forrest العلي MD on 9/05/2024 at 2:03 PM     Finalized by (CST): Forrest العلي MD on 9/05/2024 at 2:04 PM          XR KNEE (1 OR 2 VIEWS), LEFT (CPT=73560)    Result Date: 8/23/2024  PROCEDURE: XR KNEE (1 OR 2 VIEWS), LEFT (CPT=73560)  COMPARISON: [facility], XR KNEE COMPLETE BILAT EM(CPT=73564-50), 5/11/2024, 12:27 PM.  INDICATIONS: Status post Left total knee arthroplasty.  TECHNIQUE: 2 views were obtained.   FINDINGS:  BONES: Total knee arthroplasty has been performed. Hardware is in gross anatomic alignment. Visualized femur, tibia, patella and fibula are in anatomic alignment without acute fracture. SOFT TISSUES: There is soft tissue inflammation with subcutaneous emphysema and edema, compatible with recent surgery. Skin staples are present ventral to the knee. EFFUSION: There is a knee effusion. OTHER: Negative.          CONCLUSION: Postoperative changes of left knee arthroplasty.   Dictated by (CST): Can Pitt MD on 8/23/2024 at 1:31 PM     Finalized by  (CST): Can Pitt MD on 8/23/2024 at 1:32 PM             Labs:  Lab Results   Component Value Date    WBC 6.2 08/29/2024    HGB 9.0 (L) 08/29/2024    .0 08/29/2024      Lab Results   Component Value Date    GLU 97 08/29/2024    BUN 11 08/29/2024    CREATSERUM 0.66 08/29/2024    GFRNAA 78 01/14/2022    GFRAA 90 01/14/2022        Assessment and Plan:  Diagnoses and all orders for this visit:    Bilateral carpal tunnel syndrome        Assessment: Bilateral carpal tunnel syndrome left worse than right    Plan: I discussed operative and nonoperative treatment options.  She has failed all conservative treatments today.  I advised consideration of left carpal tunnel release.  Risks and benefits of surgery discussed.  Questions were answered.  No guarantees were made.  Plan to schedule the surgery in the coming weeks as an outpatient under Anthony block anesthesia.  No medical clearance should be necessary given her recent knee surgery.  Follow-up again on the date of the procedure.  I advised waiting to perform the surgery on the wrist until she is very comfortable ambulating without any assistive devices.    Follow Up: Return for follow-up visit one week after surgery.    YANIV GUERRERO MD

## 2024-09-20 ENCOUNTER — TELEPHONE (OUTPATIENT)
Dept: ORTHOPEDICS CLINIC | Facility: CLINIC | Age: 74
End: 2024-09-20

## 2024-09-20 DIAGNOSIS — G56.02 LEFT CARPAL TUNNEL SYNDROME: Primary | ICD-10-CM

## 2024-09-23 NOTE — TELEPHONE ENCOUNTER
Type of surgery:  Left Carpal Tunnel Release  Date: 11/13/24  Location: [facility]  Medical Clearance: NO     *Medical:      *Dental:      *Other:  Prior Authorization Status: Pending   Workers Comp:  Medacta/Jenae:  [place]: Yes  POV: 11/20/24

## 2024-10-10 ENCOUNTER — OFFICE VISIT (OUTPATIENT)
Dept: ORTHOPEDICS CLINIC | Facility: CLINIC | Age: 74
End: 2024-10-10

## 2024-10-10 ENCOUNTER — HOSPITAL ENCOUNTER (OUTPATIENT)
Dept: GENERAL RADIOLOGY | Facility: HOSPITAL | Age: 74
Discharge: HOME | End: 2024-10-10
Attending: ORTHOPAEDIC SURGERY
Payer: MEDICARE

## 2024-10-10 DIAGNOSIS — Z47.89 ORTHOPEDIC AFTERCARE: ICD-10-CM

## 2024-10-10 DIAGNOSIS — Z96.652 S/P TOTAL KNEE ARTHROPLASTY, LEFT: Primary | ICD-10-CM

## 2024-10-10 PROCEDURE — 99024 POSTOP FOLLOW-UP VISIT: CPT | Performed by: ORTHOPAEDIC SURGERY

## 2024-10-10 PROCEDURE — 73562 X-RAY EXAM OF KNEE 3: CPT | Performed by: ORTHOPAEDIC SURGERY

## 2024-10-10 NOTE — PROGRESS NOTES
NURSING INTAKE COMMENTS:   Chief Complaint   Patient presents with    Post-Op     S/p L TKA sx on 8/23/2024- rates pain 1-2/10 on and off       HPI: This 74 year old female presents today with complaints of left knee surgery follow-up.  She is now 7 weeks postoperative from a total knee arthroplasty.  She reports improvement respect the knee.  Her preoperative symptoms have significantly improved.  She has been able to do some dancing without any supports.  She walks well without any supports even distances.  She feels some warmth to the anterior knee.  She also notices some dimpling of the skin at the superior portion of the wound.  She takes occasional Tylenol and Advil for achiness throughout her body.    Past Medical History:    Anxiety state    Back problem    neck pain- pressure and tightness    Essential hypertension    High blood pressure    High cholesterol    Hx of motion sickness    IBS (irritable bowel syndrome)    PONV (postoperative nausea and vomiting)    lasts for hours     Past Surgical History:   Procedure Laterality Date    Cataract Left 01/21/2018    Cataract Right 07/31/2018    Hernia surgery  07/11/2016    Hysterectomy  09/08/2020    Total abdom hysterectomy       Current Outpatient Medications   Medication Sig Dispense Refill    cyanocobalamin 1000 MCG/ML Injection Solution Inject 1 mL (1,000 mcg total) into the skin once.      polypropylene glycol- (SYSTANE ULTRA) 0.4-0.3 % Ophthalmic Solution Apply 2 drops to eye as needed.      traMADol 50 MG Oral Tab Take 1 tablet (50 mg total) by mouth every 6 (six) hours as needed for Pain. No alcohol or driving on this med. Stop if lethargic or hallucinating. (Patient not taking: Reported on 10/10/2024) 20 tablet 0    traMADol 50 MG Oral Tab Take 1 tablet (50 mg total) by mouth every 6 (six) hours as needed. (Patient not taking: Reported on 10/10/2024) 25 tablet 0    aspirin 325 MG Oral Tab Take 1 tablet (325 mg total) by mouth 2 (two) times  daily. (Patient not taking: Reported on 10/10/2024) 42 tablet 0    atorvastatin 20 MG Oral Tab Take 1 tablet (20 mg total) by mouth daily.      amLODIPine 2.5 MG Oral Tab Take 1 tablet (2.5 mg total) by mouth daily.      albuterol 108 (90 Base) MCG/ACT Inhalation Aero Soln Inhale 2 puffs into the lungs every 4 (four) hours as needed for Wheezing or Shortness of Breath (cough). 1 each 3    Polyethylene Glycol 3350 (MIRALAX OR) Take 1 Package by mouth daily as needed (constipation).      acetaminophen 500 MG Oral Tab Take 2 tablets (1,000 mg total) by mouth 2 (two) times daily as needed for Pain. (Patient not taking: Reported on 10/10/2024)      Cholecalciferol 50 MCG (2000 UT) Oral Cap Take 1 tablet by mouth daily. Vitamin d and k combined       Allergies[1]  Family History   Problem Relation Age of Onset    Breast Cancer Sister         61-69    Breast Cancer Sister 40    Other (Parkinson's disease) Father     Seizure Disorder Daughter     Ovarian Cancer Neg        Social History     Occupational History    Not on file   Tobacco Use    Smoking status: Never     Passive exposure: Yes    Smokeless tobacco: Never   Vaping Use    Vaping status: Never Used   Substance and Sexual Activity    Alcohol use: Never    Drug use: Never    Sexual activity: Not on file        Review of Systems:  GENERAL: denies fevers, chills, night sweats, fatigue, unintentional weight loss/gain  SKIN: denies skin lesions, open sores, rash  HEENT:denies recent vision change, new nasal congestion,hearing loss, tinnitus, sore throat, headaches  RESPIRATORY: denies new shortness of breath, cough, asthma, wheezing  CARDIOVASCULAR: denies chest pain, leg cramps with exertion, palpitations, leg swelling  GI: denies abdominal pain, nausea, vomiting, diarrhea, constipation, hematochezia, worsening heartburn or stomach ulcers  : denies dysuria, hematuria, incontinence, increased frequency, urgency, difficulty urinating  MUSCULOSKELETAL: denies  musculoskeletal complaints other than in HPI  NEURO: denies numbness, tingling, weakness, balance issues, dizziness, memory loss  PSYCHIATRIC: denies Hx of depression, anxiety, other psychiatric disorders  HEMATOLOGIC: denies blood clots, anemia, blood clotting disorders, blood transfusion  ENDOCRINE: denies autoimmune disease, thyroid issues, or diabetes  ALLERGY: denies asthma, seasonal allergies    Physical Examination:    There were no vitals taken for this visit.  Constitutional: appears well hydrated, alert and responsive, no acute distress noted  Extremities: Left knee incision well-healed.  No erythema or palpable warmth.  There is moderate induration of the fat pad and some adherence to the mid to lower portions of the scar.  There is some over folding of the superior skin at the superior margin of the scar.  Range of motion the knee is from 0 to 120 degrees.  Good varus valgus stability in mid flexion.  No calf tenderness.  Mild left ankle swelling.  Neurological: Unchanged    Imaging:   Left knee x-rays show good maintenance alignment of the implant.  No periprosthetic lucency or fracture.    Labs:  Lab Results   Component Value Date    WBC 6.2 08/29/2024    HGB 9.0 (L) 08/29/2024    .0 08/29/2024      Lab Results   Component Value Date    GLU 97 08/29/2024    BUN 11 08/29/2024    CREATSERUM 0.66 08/29/2024    GFRNAA 78 01/14/2022    GFRAA 90 01/14/2022        Assessment and Plan:  Diagnoses and all orders for this visit:    S/P total knee arthroplasty, left    Orthopedic aftercare  -     XR KNEE (3 VIEWS), LEFT (CPT=73562); Future        Assessment: Well-functioning left total knee arthroplasty    Plan: I advised continued scar massage, Voltaren gel to the superficial knee anteriorly.  Continue outpatient physical therapy and transition to an independent program in the next 2 to 3 weeks.  Follow-up x-rays in 6 weeks.  Suggested a compression stocking for the ankle swelling.    Follow Up: Return in  about 6 weeks (around 11/21/2024).    YANIV GUERRERO MD       [1]   Allergies  Allergen Reactions    Antispasmodic NAUSEA AND VOMITING     pt unsure of reaction    Chlorthalidone DIZZINESS    Belladonna Alk-Phenobarbital UNKNOWN     pt unsure of reaction    Latex RASH

## 2024-10-22 ENCOUNTER — HOSPITAL ENCOUNTER (INPATIENT)
Facility: HOSPITAL | Age: 74
LOS: 2 days | Discharge: HOME | End: 2024-10-25
Attending: EMERGENCY MEDICINE | Admitting: INTERNAL MEDICINE
Payer: MEDICARE

## 2024-10-22 ENCOUNTER — APPOINTMENT (OUTPATIENT)
Dept: CT IMAGING | Facility: HOSPITAL | Age: 74
End: 2024-10-22
Attending: EMERGENCY MEDICINE
Payer: MEDICARE

## 2024-10-22 DIAGNOSIS — G45.9 TIA (TRANSIENT ISCHEMIC ATTACK): Primary | ICD-10-CM

## 2024-10-22 DIAGNOSIS — F33.1 MAJOR DEPRESSIVE DISORDER, RECURRENT EPISODE, MODERATE (HCC): ICD-10-CM

## 2024-10-22 LAB
ALBUMIN SERPL-MCNC: 4.6 G/DL (ref 3.2–4.8)
ALBUMIN/GLOB SERPL: 1.8 {RATIO} (ref 1–2)
ALP LIVER SERPL-CCNC: 83 U/L
ALT SERPL-CCNC: 9 U/L
ANION GAP SERPL CALC-SCNC: 7 MMOL/L (ref 0–18)
AST SERPL-CCNC: 22 U/L (ref ?–34)
BASOPHILS # BLD AUTO: 0.05 X10(3) UL (ref 0–0.2)
BASOPHILS NFR BLD AUTO: 0.7 %
BILIRUB SERPL-MCNC: 0.4 MG/DL (ref 0.2–1.1)
BUN BLD-MCNC: 12 MG/DL (ref 9–23)
BUN/CREAT SERPL: 16 (ref 10–20)
CALCIUM BLD-MCNC: 10.3 MG/DL (ref 8.7–10.4)
CHLORIDE SERPL-SCNC: 107 MMOL/L (ref 98–112)
CO2 SERPL-SCNC: 29 MMOL/L (ref 21–32)
CREAT BLD-MCNC: 0.75 MG/DL
DEPRECATED RDW RBC AUTO: 46 FL (ref 35.1–46.3)
EGFRCR SERPLBLD CKD-EPI 2021: 83 ML/MIN/1.73M2 (ref 60–?)
EOSINOPHIL # BLD AUTO: 0.13 X10(3) UL (ref 0–0.7)
EOSINOPHIL NFR BLD AUTO: 1.9 %
ERYTHROCYTE [DISTWIDTH] IN BLOOD BY AUTOMATED COUNT: 13.2 % (ref 11–15)
GLOBULIN PLAS-MCNC: 2.5 G/DL (ref 2–3.5)
GLUCOSE BLD-MCNC: 92 MG/DL (ref 70–99)
HCT VFR BLD AUTO: 36.8 %
HGB BLD-MCNC: 12.1 G/DL
IMM GRANULOCYTES # BLD AUTO: 0.02 X10(3) UL (ref 0–1)
IMM GRANULOCYTES NFR BLD: 0.3 %
LYMPHOCYTES # BLD AUTO: 1.47 X10(3) UL (ref 1–4)
LYMPHOCYTES NFR BLD AUTO: 21.3 %
MCH RBC QN AUTO: 31.3 PG (ref 26–34)
MCHC RBC AUTO-ENTMCNC: 32.9 G/DL (ref 31–37)
MCV RBC AUTO: 95.1 FL
MONOCYTES # BLD AUTO: 0.55 X10(3) UL (ref 0.1–1)
MONOCYTES NFR BLD AUTO: 8 %
NEUTROPHILS # BLD AUTO: 4.67 X10 (3) UL (ref 1.5–7.7)
NEUTROPHILS # BLD AUTO: 4.67 X10(3) UL (ref 1.5–7.7)
NEUTROPHILS NFR BLD AUTO: 67.8 %
OSMOLALITY SERPL CALC.SUM OF ELEC: 295 MOSM/KG (ref 275–295)
PLATELET # BLD AUTO: 332 10(3)UL (ref 150–450)
POTASSIUM SERPL-SCNC: 4 MMOL/L (ref 3.5–5.1)
PROT SERPL-MCNC: 7.1 G/DL (ref 5.7–8.2)
RBC # BLD AUTO: 3.87 X10(6)UL
SODIUM SERPL-SCNC: 143 MMOL/L (ref 136–145)
WBC # BLD AUTO: 6.9 X10(3) UL (ref 4–11)

## 2024-10-22 PROCEDURE — 70496 CT ANGIOGRAPHY HEAD: CPT | Performed by: EMERGENCY MEDICINE

## 2024-10-22 PROCEDURE — 99223 1ST HOSP IP/OBS HIGH 75: CPT | Performed by: INTERNAL MEDICINE

## 2024-10-22 PROCEDURE — 70498 CT ANGIOGRAPHY NECK: CPT | Performed by: EMERGENCY MEDICINE

## 2024-10-22 RX ORDER — IBUPROFEN 200 MG
400 TABLET ORAL NIGHTLY PRN
COMMUNITY

## 2024-10-22 RX ORDER — METHYLDOPA/HYDROCHLOROTHIAZIDE 250MG-15MG
1 TABLET ORAL DAILY
COMMUNITY

## 2024-10-22 RX ORDER — ASPIRIN 81 MG/1
81 TABLET, CHEWABLE ORAL ONCE
Status: COMPLETED | OUTPATIENT
Start: 2024-10-22 | End: 2024-10-22

## 2024-10-22 NOTE — ED QUICK NOTES
Pt ambulatory to ED tx room 21 from triage w/ walker.   Pt A&O x 4, answering all ?s appropriately.   Pt connected to cont ECG, pulse ox & BP.   Pt here w/ c/o: \"Not feeling right.\"  Pt reporting she feels, \"off\" since approx 1400.  Pt reporting that her right foot felt, \"heavy\" and then symptoms resolved.  Pt denies any current medical complaints at this time.  BEFAST negative.   Cart in low/locked position, side rails up x 2, call light w/ in reach.

## 2024-10-22 NOTE — ED INITIAL ASSESSMENT (HPI)
Pt arrives through triage with       complaints of not feeling well. States she felt like she was going to pass out earlier today. States she felt like her right foot \"was walking in water\". Denies numbness/ tingling on leg. Pt states she felt these symptoms around 2 pm.  +mild blurry vision on both eyes- takes eye drops.    BEFAST neg in triage    Denies thinners    Hx of htn

## 2024-10-22 NOTE — ED QUICK NOTES
Per Dr. Stephenson, pt can be marked \"ready\" for CT as pt had labs drawn this AM at PCP.  CT notified.

## 2024-10-23 ENCOUNTER — APPOINTMENT (OUTPATIENT)
Dept: MRI IMAGING | Facility: HOSPITAL | Age: 74
End: 2024-10-23
Attending: Other
Payer: MEDICARE

## 2024-10-23 ENCOUNTER — APPOINTMENT (OUTPATIENT)
Dept: CV DIAGNOSTICS | Facility: HOSPITAL | Age: 74
End: 2024-10-23
Attending: INTERNAL MEDICINE
Payer: MEDICARE

## 2024-10-23 PROBLEM — F33.1 MAJOR DEPRESSIVE DISORDER, RECURRENT EPISODE, MODERATE (HCC): Status: ACTIVE | Noted: 2024-10-23

## 2024-10-23 PROBLEM — F45.0 ANXIETY WITH SOMATIZATION: Status: ACTIVE | Noted: 2024-10-23

## 2024-10-23 PROBLEM — F41.9 ANXIETY WITH SOMATIZATION: Status: ACTIVE | Noted: 2024-10-23

## 2024-10-23 LAB
ATRIAL RATE: 59 BPM
CHOLEST SERPL-MCNC: 140 MG/DL (ref ?–200)
EST. AVERAGE GLUCOSE BLD GHB EST-MCNC: 100 MG/DL (ref 68–126)
GLUCOSE BLDC GLUCOMTR-MCNC: 116 MG/DL (ref 70–99)
GLUCOSE BLDC GLUCOMTR-MCNC: 77 MG/DL (ref 70–99)
GLUCOSE BLDC GLUCOMTR-MCNC: 88 MG/DL (ref 70–99)
GLUCOSE BLDC GLUCOMTR-MCNC: 96 MG/DL (ref 70–99)
HBA1C MFR BLD: 5.1 % (ref ?–5.7)
HDLC SERPL-MCNC: 49 MG/DL (ref 40–59)
LDLC SERPL CALC-MCNC: 77 MG/DL (ref ?–100)
NONHDLC SERPL-MCNC: 91 MG/DL (ref ?–130)
P AXIS: 40 DEGREES
P-R INTERVAL: 172 MS
Q-T INTERVAL: 432 MS
QRS DURATION: 92 MS
QTC CALCULATION (BEZET): 427 MS
R AXIS: -6 DEGREES
T AXIS: 19 DEGREES
TRIGL SERPL-MCNC: 70 MG/DL (ref 30–149)
VENTRICULAR RATE: 59 BPM
VLDLC SERPL CALC-MCNC: 11 MG/DL (ref 0–30)

## 2024-10-23 PROCEDURE — 99233 SBSQ HOSP IP/OBS HIGH 50: CPT | Performed by: HOSPITALIST

## 2024-10-23 PROCEDURE — 90792 PSYCH DIAG EVAL W/MED SRVCS: CPT | Performed by: OTHER

## 2024-10-23 PROCEDURE — 93306 TTE W/DOPPLER COMPLETE: CPT | Performed by: INTERNAL MEDICINE

## 2024-10-23 PROCEDURE — 99223 1ST HOSP IP/OBS HIGH 75: CPT | Performed by: OTHER

## 2024-10-23 PROCEDURE — 70551 MRI BRAIN STEM W/O DYE: CPT | Performed by: OTHER

## 2024-10-23 RX ORDER — ACETAMINOPHEN 500 MG
1000 TABLET ORAL DAILY PRN
Status: DISCONTINUED | OUTPATIENT
Start: 2024-10-23 | End: 2024-10-25

## 2024-10-23 RX ORDER — ACETAMINOPHEN 325 MG/1
650 TABLET ORAL EVERY 4 HOURS PRN
Status: DISCONTINUED | OUTPATIENT
Start: 2024-10-23 | End: 2024-10-25

## 2024-10-23 RX ORDER — ACETAMINOPHEN 650 MG/1
650 SUPPOSITORY RECTAL EVERY 4 HOURS PRN
Status: DISCONTINUED | OUTPATIENT
Start: 2024-10-23 | End: 2024-10-25

## 2024-10-23 RX ORDER — ASPIRIN 81 MG/1
81 TABLET, CHEWABLE ORAL DAILY
Status: DISCONTINUED | OUTPATIENT
Start: 2024-10-23 | End: 2024-10-25

## 2024-10-23 RX ORDER — ATORVASTATIN CALCIUM 20 MG/1
20 TABLET, FILM COATED ORAL NIGHTLY
Status: DISCONTINUED | OUTPATIENT
Start: 2024-10-23 | End: 2024-10-25

## 2024-10-23 RX ORDER — CLOPIDOGREL BISULFATE 75 MG/1
75 TABLET ORAL DAILY
Status: DISCONTINUED | OUTPATIENT
Start: 2024-10-23 | End: 2024-10-25

## 2024-10-23 RX ORDER — DULOXETIN HYDROCHLORIDE 20 MG/1
20 CAPSULE, DELAYED RELEASE ORAL NIGHTLY
Status: DISCONTINUED | OUTPATIENT
Start: 2024-10-23 | End: 2024-10-24

## 2024-10-23 RX ORDER — METOCLOPRAMIDE HYDROCHLORIDE 5 MG/ML
5 INJECTION INTRAMUSCULAR; INTRAVENOUS EVERY 8 HOURS PRN
Status: DISCONTINUED | OUTPATIENT
Start: 2024-10-23 | End: 2024-10-25

## 2024-10-23 RX ORDER — AMLODIPINE BESYLATE 2.5 MG/1
2.5 TABLET ORAL DAILY
Status: DISCONTINUED | OUTPATIENT
Start: 2024-10-23 | End: 2024-10-25

## 2024-10-23 RX ORDER — ONDANSETRON 2 MG/ML
4 INJECTION INTRAMUSCULAR; INTRAVENOUS EVERY 6 HOURS PRN
Status: DISCONTINUED | OUTPATIENT
Start: 2024-10-23 | End: 2024-10-25

## 2024-10-23 RX ORDER — HEPARIN SODIUM 5000 [USP'U]/ML
5000 INJECTION, SOLUTION INTRAVENOUS; SUBCUTANEOUS EVERY 12 HOURS SCHEDULED
Status: DISCONTINUED | OUTPATIENT
Start: 2024-10-23 | End: 2024-10-25

## 2024-10-23 RX ORDER — CLONAZEPAM 0.5 MG/1
0.5 TABLET ORAL NIGHTLY
Status: DISCONTINUED | OUTPATIENT
Start: 2024-10-23 | End: 2024-10-25

## 2024-10-23 RX ORDER — SODIUM CHLORIDE 9 MG/ML
INJECTION, SOLUTION INTRAVENOUS CONTINUOUS
Status: ACTIVE | OUTPATIENT
Start: 2024-10-23 | End: 2024-10-25

## 2024-10-23 RX ORDER — CHOLECALCIFEROL (VITAMIN D3) 25 MCG
1000 TABLET ORAL DAILY
Status: DISCONTINUED | OUTPATIENT
Start: 2024-10-23 | End: 2024-10-25

## 2024-10-23 NOTE — PLAN OF CARE
Problem: Patient Centered Care  Goal: Patient preferences are identified and integrated in the patient's plan of care  Description: Interventions:  - What would you like us to know as we care for you? I'm from [facility].  - Provide timely, complete, and accurate information to patient/family  - Incorporate patient and family knowledge, values, beliefs, and cultural backgrounds into the planning and delivery of care  - Encourage patient/family to participate in care and decision-making at the level they choose  - Honor patient and family perspectives and choices  10/23/2024 1132 by Darlyn Galeano RN  Outcome: Progressing     Problem: Patient/Family Goals  Goal: Patient/Family Long Term Goal  Description: Patient's Long Term Goal: get stronger    Interventions:  - PT/OT  - See additional Care Plan goals for specific interventions  10/23/2024 1132 by Darlyn Galeano RN  Outcome: Progressing     Problem: Patient/Family Goals  Goal: Patient/Family Short Term Goal  Description: Patient's Short Term Goal: go home    Interventions:   - Take prescribed medications  - See additional Care Plan goals for specific interventions  10/23/2024 1132 by Darlyn Galeano RN  Outcome: Progressing     Problem: NEUROLOGICAL - ADULT  Goal: Achieves maximal functionality and self care  Description: INTERVENTIONS  - Monitor swallowing and airway patency with patient fatigue and changes in neurological status  - Encourage and assist patient to increase activity and self care with guidance from PT/OT  - Encourage visually impaired, hearing impaired and aphasic patients to use assistive/communication devices  10/23/2024 1132 by Darlyn Galeano, RN  Outcome: Progressing

## 2024-10-23 NOTE — ED QUICK NOTES
Rounding Completed    Plan of Care reviewed. Waiting for room to be cleaned for admission.  Elimination needs assessed.  Patient resting comfortably, respirations regular and unlabored.  Provided decreased lighting to allow patient to try and sleep.    Bed is locked and in lowest position. Call light within reach.

## 2024-10-23 NOTE — ED QUICK NOTES
Report received from RACHEL Khan.    Patient has returned to ED from CT. Patient resting comfortably, respirations regular and unlabored.     Awaiting CT results.  Elimination assessed, and patient will use call light when needing to get up.    Bed in lowest position, call light at reach.

## 2024-10-23 NOTE — PHYSICAL THERAPY NOTE
PHYSICAL THERAPY EVALUATION - INPATIENT     Room Number: 329/329-A  Evaluation Date: 10/23/2024  Type of Evaluation: Initial   Physician Order: PT Eval and Treat    Presenting Problem: transient weakness in RLE and dizziness  Co-Morbidities : HLD, HTN, anxiety. L TKA  8/24  Reason for Therapy: Mobility Dysfunction and Discharge Planning    PHYSICAL THERAPY ASSESSMENT   Patient is a 74 year old female admitted 10/22/2024 for transient weakness in RLE and dizziness.  Prior to admission, patient's baseline is independent with all ADL's and IADL's +driving.  Patient is currently functioning near baseline with bed mobility, transfers, gait, stair negotiation, maintaining seated position, standing prolonged periods, and performing household tasks.  Patient is requiring stand-by assist and contact guard assist as a result of the following impairments: decreased functional strength, decreased endurance/aerobic capacity, and impaired dynamic standing balance.  Physical Therapy will continue to follow for duration of hospitalization.    Patient will benefit from continued skilled PT Services at discharge to promote prior level of function and safety with additional support and return home with OP PT should balance deficits persist.     PLAN DURING HOSPITALIZATION  Nursing Mobility Recommendation : 1 Assist  PT Device Recommendation: Rolling walker  PT Treatment Plan: Bed mobility;Body mechanics;Patient education;Energy conservation;Family education;Gait training;Range of motion;Strengthening;Transfer training;Balance training;Stoop training;Stair training  Rehab Potential : Good  Frequency (Obs): 3-5x/week     PHYSICAL THERAPY MEDICAL/SOCIAL HISTORY     Problem List  Principal Problem:    TIA (transient ischemic attack)      HOME SITUATION  Type of Home: Independent living facility  Home Layout: One level;Elevator      Lives With: Alone    Drives: Yes         SUBJECTIVE  \"I just feel like I'm going to drop at every minute\"      PHYSICAL THERAPY EXAMINATION   OBJECTIVE  Precautions: None  Fall Risk: Standard fall risk    WEIGHT BEARING RESTRICTION       PAIN ASSESSMENT  Ratin          COGNITION  Overall Cognitive Status:  WFL - within functional limits    RANGE OF MOTION AND STRENGTH ASSESSMENT  Upper extremity ROM and strength are within functional limits   Lower extremity ROM is within functional limits   Lower extremity strength is within functional limits     BALANCE  Static Sitting: Normal  Dynamic Sitting: Good  Static Standing: Fair +  Dynamic Standing: Fair    NEUROLOGICAL FINDINGS   Sensation: numbness and tingling bilateral hands from Carpal Tunnel (plan for release in November).     ACTIVITY TOLERANCE  Pulse: 77  Heart Rate Source: Monitor     BP: (!) 170/72  BP Location: Right arm  BP Method: Automatic  Patient Position: Sitting    O2 WALK  Oxygen Therapy  SPO2% Ambulation on Room Air: 92    AM-PAC '6-Clicks' INPATIENT SHORT FORM - BASIC MOBILITY  How much difficulty does the patient currently have...  Patient Difficulty: Turning over in bed (including adjusting bedclothes, sheets and blankets)?: None   Patient Difficulty: Sitting down on and standing up from a chair with arms (e.g., wheelchair, bedside commode, etc.): A Little   Patient Difficulty: Moving from lying on back to sitting on the side of the bed?: None   How much help from another person does the patient currently need...   Help from Another: Moving to and from a bed to a chair (including a wheelchair)?: A Little   Help from Another: Need to walk in hospital room?: A Little   Help from Another: Climbing 3-5 steps with a railing?: A Little     AM-PAC Score:  Raw Score: 20   Approx Degree of Impairment: 35.83%   Standardized Score (AM-PAC Scale): 47.67   CMS Modifier (G-Code): CJ    FUNCTIONAL ABILITY STATUS  Functional Mobility/Gait Assessment  Gait Assistance: Contact guard assist (SBA)  Distance (ft): 250ft  Assistive Device: Rolling walker;None  Pattern:   (without DME, CGA, guarded posture and step-to gait pattern. Improved fluidity of gait with use of DME. Encouraged continued use of DME to promote stability- SBA)  Supine to Sit: independent  Sit to Supine: Independent  Sit to Stand: Supervision with use of RW. Cues for pacing upon standing to allow body time to acclimate to change in position.     Exercise/Education Provided:  Bed mobility  Body mechanics  Energy conservation  Functional activity tolerated  Gait training  Posture  ROM  Strengthening  Transfer training    Skilled Therapy Provided: Patient received supine in bed. RN approved activity. Therapist educated pt on POC and benefits of mobilization. Patient agreeable to participate. Denies pain. Continues to c/o sinus pressure and feeling \"off\" that she feels like her LE's will \"just give out\"- states these symptoms have been going on for ~1 week. BP post activity: 170/72mmHg, RN made aware.     The patient's Approx Degree of Impairment: 35.83% has been calculated based on documentation in the [facility] '6 clicks' Inpatient Basic Mobility Short Form.  Research supports that patients with this level of impairment may benefit from home with outpatient PT should balance deficits persist.   Final disposition will be made by interdisciplinary medical team.    Patient End of Session: Needs met;Call light within reach;RN aware of session/findings;In bed    CURRENT GOALS  Goals to be met by: 10/30/24  Patient Goal Patient's self-stated goal is: return to PLOF   Goal #1 Patient is able to demonstrate supine - sit EOB @ level: independent     Goal #1   Current Status    Goal #2 Patient is able to demonstrate transfers Sit to/from Stand at assistance level: modified independent with walker - rolling     Goal #2  Current Status    Goal #3 Patient is able to ambulate 300 feet with assist device: walker - rolling at assistance level: modified independent   Goal #3   Current Status    Goal #4 Patient will participate in 10  minutes of functional standing dynamic balance activities requiring fall ready guarding to prepare for home DC   Goal #4   Current Status    Goal #5 Patient to demonstrate independence with home activity/exercise instructions provided to patient in preparation for discharge.   Goal #5   Current Status      Patient Evaluation Complexity Level:  History Low - no personal factors and/or co-morbidities   Examination of body systems Low -  addressing 1-2 elements   Clinical Presentation Low- Stable   Clinical Decision Making  Low Complexity     Gait Training: 15 minutes

## 2024-10-23 NOTE — H&P
[facility]  part of [facility]    History and Physical    Zaida Osuna Patient Status:  Emergency    1950 MRN [id number]   Location [facility] EMERGENCY DEPARTMENT Attending Yumiko Pennington MD   Hosp Day # 0 PCP KEMAR JAY MD     Date:  10/22/2024  Date of Admission:  10/22/2024    History provided by:patient  HPI:   No chief complaint on file.    HPI    74 year old with hx of HLD, HTN, anxiety presenting for symptoms of dizziness, right LE numbness that began around 1-2 pm today.  Symptoms were transient.  Continues to reports not \"feeling well\"    In the ED hemodynamically stable.  CBC and CMP stable.   CTA brain and carotids completed.  Beaded interntal appearance of carotid arteries concerning for fibromuscular dysplasia.  No other acute findings.          History     Past Medical History:    Anxiety state    Back problem    neck pain- pressure and tightness    Essential hypertension    High blood pressure    High cholesterol    Hx of motion sickness    IBS (irritable bowel syndrome)    PONV (postoperative nausea and vomiting)    lasts for hours     Past Surgical History:   Procedure Laterality Date    Cataract Left 2018    Cataract Right 2018    Hernia surgery  2016    Hysterectomy  2020    Total abdom hysterectomy       Family History   Problem Relation Age of Onset    Breast Cancer Sister         61-69    Breast Cancer Sister 40    Other (Parkinson's disease) Father     Seizure Disorder Daughter     Ovarian Cancer Neg      Social History:  Social History     Socioeconomic History    Marital status: Single   Tobacco Use    Smoking status: Never     Passive exposure: Yes    Smokeless tobacco: Never   Vaping Use    Vaping status: Never Used   Substance and Sexual Activity    Alcohol use: Never    Drug use: Never     Social Drivers of Health     Food Insecurity: No Food Insecurity (2024)    Food Insecurity     Food Insecurity: Never true    Transportation Needs: Unmet Transportation Needs (8/23/2024)    Transportation Needs     Lack of Transportation: Yes   Housing Stability: Low Risk  (8/23/2024)    Housing Stability     Housing Instability: No     Allergies/Medications:   Allergies: Allergies[1]  (Not in a hospital admission)      Review of Systems:     Constitutional:  Positive for fatigue.   HENT: Negative.     Respiratory: Negative.     Cardiovascular: Negative.    Gastrointestinal: Negative.    Endocrine: Negative.    Genitourinary: Negative.    Allergic/Immunologic: Negative.    Neurological:  Positive for dizziness.   Hematological: Negative.    Psychiatric/Behavioral: Negative.         Physical Exam:   Vital Signs:  Blood pressure 139/69, pulse 63, temperature 98.4 °F (36.9 °C), temperature source Oral, resp. rate 19, height 5' 1\" (1.549 m), weight 118 lb (53.5 kg), SpO2 98%, not currently breastfeeding.  Physical Exam  Vitals reviewed.   HENT:      Head: Normocephalic.      Nose: Nose normal.      Mouth/Throat:      Mouth: Mucous membranes are moist.   Neck:      Vascular: Carotid bruit present.   Cardiovascular:      Rate and Rhythm: Normal rate.      Pulses: Normal pulses.      Heart sounds: Normal heart sounds.   Pulmonary:      Effort: Pulmonary effort is normal.      Breath sounds: Normal breath sounds.   Skin:     General: Skin is warm.   Neurological:      General: No focal deficit present.      Mental Status: She is alert.   Psychiatric:         Mood and Affect: Mood normal.           Results:     Lab Results   Component Value Date    WBC 6.9 10/22/2024    HGB 12.1 10/22/2024    HCT 36.8 10/22/2024    .0 10/22/2024    CREATSERUM 0.75 10/22/2024    BUN 12 10/22/2024     10/22/2024    K 4.0 10/22/2024     10/22/2024    CO2 29.0 10/22/2024    GLU 92 10/22/2024    CA 10.3 10/22/2024    ALB 4.6 10/22/2024    ALKPHO 83 10/22/2024    BILT 0.4 10/22/2024    TP 7.1 10/22/2024    AST 22 10/22/2024    ALT 9 (L) 10/22/2024     TSH 0.868 05/23/2023     03/12/2021    DDIMER 1.03 (H) 11/01/2020    MG 1.9 08/29/2024    TROP <0.045 11/01/2020    B12 989 (H) 04/19/2021     CTA BRAIN + CTA CAROTIDS (CPT=70496/21811)    Result Date: 10/22/2024  CONCLUSION:   Mild chronic microvascular ischemic disease without acute intracranial abnormality.  Beaded appearance of the bilateral internal carotid arteries can be seen with fibromuscular dysplasia.  No acute dissection.  Patent neck and intracranial vasculature.    Dictated by (CST): Manoj Machado MD on 10/22/2024 at 7:56 PM     Finalized by (CST): Manoj Machado MD on 10/22/2024 at 8:02 PM         EKG    Result Date: 10/22/2024  Sinus bradycardia Moderate voltage criteria for LVH, may be normal variant ( R in aVL , Reji product ) Borderline ECG No previous ECGs found in [facility]     Assessment/Plan:       Numbness/ altered mentation  - Atypical symptoms of RLE numbness and possible dizziness.  TIA amongst differential.  Will complete work up with TTE  - Asa 81 administered.  On atorvastatin.   - Neurology consulted   - Of note possible fibromuscular dysplasia on CTA neck.  Follow up with PCP.       HTN   - On amlodipine 2.5 mg daily      HLD  - On atorvastatin 20.     diet: general  ivf: none  dvt prophylaxis: sc lovenox  antibiotics: none  tubes: none  central lines: none  code status: full code  dispo: home upon medical clearance    Greater than 70 Minutes, >50% time spent counseling and coordinating care regarding treatment and workup.                  Kody Recio MD  10/22/2024         [1]   Allergies  Allergen Reactions    Antispasmodic NAUSEA AND VOMITING     pt unsure of reaction    Chlorthalidone DIZZINESS    Belladonna Alk-Phenobarbital UNKNOWN     pt unsure of reaction    Latex RASH

## 2024-10-23 NOTE — SLP NOTE
ADULT SWALLOWING EVALUATION    ASSESSMENT    ASSESSMENT/OVERALL IMPRESSION:  PPE REQUIRED. THIS THERAPIST WORE GLOVES, DROPLET MASK, AND GOGGLES FOR DURATION OF EVALUATION. HANDS WASHED UPON ENTRANCE/EXIT.    SLP BSSE orders received and acknowledged. A swallow evaluation warranted secondary to stroke protocol. Pt afebrile with clear vocal quality, on room air, with oxygen saturation at 99. Pt with no prior hx of dysphagia at [facility]. Pt positioned upright in bed. Pt with no complaints of pain, RN aware. Pt with adequate oral acceptance and bilabial seal across all trials. Pt with intact bite, mastication of solids, and timely A-P transit. Pt's swallow response appears timely with adequate hyolaryngeal elevation/excursion. No clinical signs of aspiration (e.g., immediate/delayed throat clear, immediate/delayed cough, wet vocal quality, increased O2 effort) observed across all trials.   Oxygen status remained >98 t/o the entire evaluation.     At this time, pt presents with adequate oral and pharyngeal phase for safe oral intake. Recommend a regular diet and thin liquids with strict adherence to safe swallowing compensatory strategies. Results and recommendations reviewed with RN and pt. Pt v/u to all results/recommendations. Recommendations remain written on whiteboard.       PLAN: SLP to sign off at this time secondary to pt tolerating least restrictive diet with no CSA. Please reorder SLP if MRI positive.     RECOMMENDATIONS   Diet Recommendations - Solids: Regular  Diet Recommendations - Liquids: Thin Liquids      Compensatory Strategies Recommended:  (n/a)  Aspiration Precautions: Upright position  Medication Administration Recommendations: No restrictions  Treatment Plan/Recommendations:  (Reorder for SLE if MRI completed and positive)    HISTORY   MEDICAL HISTORY  Reason for Referral: Stroke protocol    Problem List  Principal Problem:    TIA (transient ischemic attack)      Past Medical History  Past Medical  History:    Anxiety state    Back problem    neck pain- pressure and tightness    Essential hypertension    High blood pressure    High cholesterol    Hx of motion sickness    IBS (irritable bowel syndrome)    PONV (postoperative nausea and vomiting)    lasts for hours       Prior Living Situation:  (Independent Living)  Diet Prior to Admission: Regular;Thin liquids  Precautions: None    Patient/Family Goals: no difficulties swallowing    SWALLOWING HISTORY  Current Diet Consistency: Regular;Thin liquids  Dysphagia History: None at [facility]    Imaging Results:     CTA BRAIN 10/22/24:  CONCLUSION:      Mild chronic microvascular ischemic disease without acute intracranial abnormality.      Beaded appearance of the bilateral internal carotid arteries can be seen with fibromuscular dysplasia.  No acute dissection.      Patent neck and intracranial vasculature.            Dictated by (CST): Manoj Machado MD on 10/22/2024 at 7:56 PM       Finalized by (CST): Manoj Machado MD on 10/22/2024 at 8:02 PM     OBJECTIVE   ORAL MOTOR EXAMINATION  Dentition: Natural;Functional  Symmetry: Within Functional Limits  Strength: Within Functional Limits     Range of Motion: Within Functional Limits  Rate of Motion: Within Functional Limits    Voice Quality: Clear  Respiratory Status: Unlabored  Consistencies Trialed: Thin liquids;Hard solid  Method of Presentation: Self presentation;Cup;Straw;Single sips  Patient Positioning: Upright;Midline    Oral Phase of Swallow: Within Functional Limits       Pharyngeal Phase of Swallow: Within Functional Limits           (Please note: Silent aspiration cannot be evaluated clinically. Videofluoroscopic Swallow Study is required to rule-out silent aspiration.)    Esophageal Phase of Swallow: No complaints consistent with possible esophageal involvement      FOLLOW UP  Treatment Plan/Recommendations:  (Reorder for SLE if MRI completed and positive)  Number of Visits to Meet Established Goals: 0  Follow Up  Needed (Documentation Required): No       Thank you for your referral.   If you have any questions, please contact BRANDON Bardales M.S. CCC-SLP  Speech Language Pathologist  Phone Number [phone]

## 2024-10-23 NOTE — OCCUPATIONAL THERAPY NOTE
OCCUPATIONAL THERAPY EVALUATION - INPATIENT     Room Number: 329/329-A  Evaluation Date: 10/23/2024  Type of Evaluation: Initial   Presenting Problem: Dizziness, RLE numbness  Co-Morbidities: HLD, HTN, anxiety    Physician Order: IP Consult to Occupational Therapy  Reason for Therapy: ADL/IADL Dysfunction and Discharge Planning    OCCUPATIONAL THERAPY ASSESSMENT   Patient is a 74 year old female admitted 10/22/2024 for dizziness, RLE numbness.  Prior to admission, patient's baseline is independent with all ADLs and functional mobility without ad use.  Patient is currently functioning near baseline with toileting, bed mobility, transfers, and functional standing tolerance.  Patient is requiring supervision and contact guard assist as a result of the following impairments: decreased functional strength, decreased endurance, and decreased muscular endurance. Occupational Therapy will continue to follow for duration of hospitalization.    Patient will benefit from continued skilled OT Services for duration of hospitalization, however, given the patient is functioning near baseline level do not anticipate skilled therapy needs at discharge .    PLAN DURING HOSPITALIZATION  OT Device Recommendations: Shower chair  OT Treatment Plan: Balance activities;Functional transfer training;Patient/Family education;Equipment eval/education;Patient/Family training     OCCUPATIONAL THERAPY MEDICAL/SOCIAL HISTORY   Problem List  Principal Problem:    TIA (transient ischemic attack)    HOME SITUATION  Type of Home: Independent living facility  Home Layout: One level  Lives With: Alone  Toilet and Equipment: Standard height toilet; Toilet riser with arms  Shower/Tub and Equipment: Tub-shower combo  Other Equipment: None    Stairs in Home: none   Use of Assistive Device(s): Pt reports being independent with all functional mobility without ad use. Pt has a walker from a previous L knee procedure.     Prior Level of [place]: Prior to  admission pt reports being independent with all ADLs. Pt lives in a senior living community alone and attends events put on by the facility.     SUBJECTIVE  \"I feel like I've lost my spunk\"     OCCUPATIONAL THERAPY EXAMINATION      OBJECTIVE       PAIN ASSESSMENT  Ratin      ACTIVITY TOLERANCE  Pulse: 75  Heart Rate Source: Monitor     BP: 151/72  BP Location: Right arm  BP Method: Automatic  Patient Position: Semi-Alberto    O2 SATURATIONS  Oxygen Therapy  SPO2% on Room Air at Rest: 98    COGNITION  Overall Cognitive Status:  WFL - within functional limits    RANGE OF MOTION   Upper extremity ROM is within functional limits     STRENGTH ASSESSMENT  Upper extremity strength is within functional limits     COORDINATION  Gross Motor: WFL   Fine Motor: WFL     Tests completed:   -FNF, eyes occluded: WFL   -DEDE (rapid alternating movements): WFL   -finger opposition: WFL      ACTIVITIES OF DAILY LIVING ASSESSMENT  AM-PAC ‘6-Clicks’ Inpatient Daily Activity Short Form  How much help from another person does the patient currently need…  -   Putting on and taking off regular lower body clothing?: A Little  -   Bathing (including washing, rinsing, drying)?: A Little  -   Toileting, which includes using toilet, bedpan or urinal? : A Little  -   Putting on and taking off regular upper body clothing?: None  -   Taking care of personal grooming such as brushing teeth?: None  -   Eating meals?: None    AM-PAC Score:  Score: 21  Approx Degree of Impairment: 32.79%  Standardized Score (AM-PAC Scale): 44.27  CMS Modifier (G-Code): CJ    FUNCTIONAL TRANSFER ASSESSMENT  Sit to Stand: Edge of Bed; Chair  Edge of Bed: Contact Guard Assist  Chair: Contact Guard Assist  Toilet Transfer: Contact Guard Assist    BED MOBILITY  Supine to Sit : Stand-by Assist    BALANCE ASSESSMENT  Static Standing: Contact Guard Assist  Dynamic Standing: Contact Guard Assist     FUNCTIONAL ADL ASSESSMENT  Grooming Standing: Contact Guard Assist  LB  Dressing Seated: Supervision  LB Dressing Standing: Contact Guard Assist  Toileting Seated: Supervision    Skilled Therapy Provided:   RN cleared pt for participation. Pt received in bed and agreeable to therapy. Pt ed on topics listed below. Pt responded well to education and return demonstrated all techniques. Pt reporting that she feels symptoms have resolved but she still doesn't feel \"as strong as she used to be\". Pt demonstrating no BUE strength or coordination deficits. Pt completed functional transfers (toilet transfer) and mobility with CGA and RW for balance. Pt with no LOB, dizziness, or SOB with movement. Pt completed toileting and LB dressing seated with SUP return demo figure four technique. Pt would continue to benefit from item retrieval tasks to mirror household distances and challenge functional balance and standing grooming tasks to increase endurance for ADLs at discharge.       EDUCATION PROVIDED  Patient Education : Role of Occupational Therapy; Plan of Care; DME Recommendations; Functional Transfer Techniques; Fall Prevention; Posture/Positioning; Proper Body Mechanics; Other (compensatory ADL techniques)  Patient's Response to Education: Verbalized Understanding; Returned Demonstration    The patient's Approx Degree of Impairment: 32.79% has been calculated based on documentation in the [facility] '6 clicks' Inpatient Daily Activity Short Form.  Research supports that patients with this level of impairment may benefit from return home.  Final disposition will be made by interdisciplinary medical team.     Patient End of Session: Up in chair;Needs met;Call light within reach;RN aware of session/findings;All patient questions and concerns addressed;Alarm set    OT Goals  Patients self stated goal is: none      Patient will complete functional transfer with mod I   Comment:     Patient will complete toileting with mod I   Comment:     Patient will tolerate standing for 5 minutes in prep for adls with  mod I    Comment:    Patient will complete item retrieval with mod I   Comment:          Goals  on: 10/30/24  Frequency: 1-2 more sessions     Patient Evaluation Complexity Level:   Occupational Profile/Medical History LOW - Brief history including review of medical or therapy records    Specific performance deficits impacting engagement in ADL/IADL LOW  1 - 3 performance deficits    Client Assessment/Performance Deficits MODERATE - Comorbidities and min to mod modifications of tasks    Clinical Decision Making LOW - Analysis of occupational profile, problem-focused assessments, limited treatment options    Overall Complexity LOW     OT Session Time: 30 minutes  Self-Care Home Management: 30 minutes

## 2024-10-23 NOTE — CM/SW NOTE
10/23/24 1100   CM/SW Referral Data   Referral Source Social Work (self-referral)   Reason for Referral Discharge planning   Informant Patient   Medical Hx   Does patient have an established PCP? Yes   Significant Past Medical/Mental Health Hx Anxiety, HLD, HTN   Patient Info   Patient's Current Mental Status at Time of Assessment Alert;Oriented   Patient's Home Environment Independent Living  ([facility])   Number of Levels in Home 1   Number of Stair in Home 0   Patient lives with Alone   Patient Status Prior to Admission   Independent with ADLs and Mobility Yes   Services in place prior to admission DME/Supplies at home;Home Health Care   Home Health Provider Info Residential HH   Type of DME/Supplies Wheeled Walker   Discharge Needs   Anticipated D/C needs To be determined     Social work was able to speak to the patient at bedside regarding discharge planning.     The patient lives at [facility].  The patient is independent with all ADLs at baseline.  The patient owns a wheeled walker.    The patient states she has been having feelings of shutting down and being extremely unmotivated.  The patient finds it hard to want to do basic ADLs.  The patient sees a therapist via telehealth but she would like to see a therapist in person as she feels herself mentally going downhill.    The patient is not on any medication for depression at this time but she is open to medication.  Psych liaison has been consulted due to her feelings of depression and openness to medication.     The patient recently has to put her daughter in a SNF due to her mental & physical issues.  The patient also has an estranged relationship with her son which is contributing to her depression.    Social work provided Walter Amador and other inpatient therapy resources at bedside.    The patient was appreciative.      SW/CM to remain available for support and/or discharge planning.     Marcelle Sanderson MSW, LSW  Discharge Planner [id number]

## 2024-10-23 NOTE — ED QUICK NOTES
Report given & pt care endorsed to RACHEL Beckwith.      Pt in CT scan at this time.  Pt left for CT scan via cart, accompanied by CT transport, in NAD, VSS, A&O x 4.

## 2024-10-23 NOTE — ED PROVIDER NOTES
Patient Seen in: [facility] Emergency Department      History   No chief complaint on file.    Stated Complaint: r/o TIA    Subjective:   HPI      74-year-old female with history of high blood pressure, cholesterol presents for evaluation of right leg numbness.  Patient sent from [facility] immediate care for evaluation of possible TIA.  Patient reports yesterday she started to feel unwell, as if she was coming down with a virus, had fatigue, malaise, rhinorrhea.  COVID test was negative.  She reports she went to walk-in clinic today for further evaluation, she reported having an episode of right lower extremity numbness/weakness in which she felt as if she was \"walking on a water bed.\"  She reports this lasted a short amount of time and resolved without intervention.  She is now back to baseline.  Denies focal weakness or numbness, diplopia, vertigo.    Objective:     Past Medical History:    Anxiety state    Back problem    neck pain- pressure and tightness    Essential hypertension    High blood pressure    High cholesterol    Hx of motion sickness    IBS (irritable bowel syndrome)    PONV (postoperative nausea and vomiting)    lasts for hours              Past Surgical History:   Procedure Laterality Date    Cataract Left 01/21/2018    Cataract Right 07/31/2018    Hernia surgery  07/11/2016    Hysterectomy  09/08/2020    Total abdom hysterectomy                  Social History     Socioeconomic History    Marital status: Single   Tobacco Use    Smoking status: Never     Passive exposure: Yes    Smokeless tobacco: Never   Vaping Use    Vaping status: Never Used   Substance and Sexual Activity    Alcohol use: Never    Drug use: Never     Social Drivers of Health     Food Insecurity: No Food Insecurity (8/23/2024)    Food Insecurity     Food Insecurity: Never true   Transportation Needs: Unmet Transportation Needs (8/23/2024)    Transportation Needs     Lack of Transportation: Yes   Housing Stability: Low Risk   (8/23/2024)    Housing Stability     Housing Instability: No                  Physical Exam     ED Triage Vitals [10/22/24 1711]   /90   Pulse 79   Resp 18   Temp 98.4 °F (36.9 °C)   Temp src Oral   SpO2 94 %   O2 Device None (Room air)       Current Vitals:   Vital Signs  BP: 139/69  Pulse: 63  Resp: 19  Temp: 98.4 °F (36.9 °C)  Temp src: Oral  MAP (mmHg): 91    Oxygen Therapy  SpO2: 98 %  O2 Device: None (Room air)        Physical Exam  Vitals and nursing note reviewed.   Constitutional:       General: She is not in acute distress.     Appearance: She is well-developed.   HENT:      Head: Normocephalic and atraumatic.   Eyes:      Extraocular Movements: Extraocular movements intact.      Conjunctiva/sclera: Conjunctivae normal.      Pupils: Pupils are equal, round, and reactive to light.   Cardiovascular:      Rate and Rhythm: Normal rate and regular rhythm.      Heart sounds: Normal heart sounds.   Pulmonary:      Effort: Pulmonary effort is normal. No respiratory distress.      Breath sounds: Normal breath sounds.   Abdominal:      General: Bowel sounds are normal. There is no distension.      Palpations: Abdomen is soft.      Tenderness: There is no abdominal tenderness. There is no guarding or rebound.   Musculoskeletal:         General: Normal range of motion.      Cervical back: Normal range of motion and neck supple.   Skin:     General: Skin is warm and dry.      Findings: No rash.   Neurological:      General: No focal deficit present.      Mental Status: She is alert and oriented to person, place, and time.      Cranial Nerves: No cranial nerve deficit.      Sensory: No sensory deficit.      Motor: No weakness.             ED Course     Labs Reviewed   COMP METABOLIC PANEL (14) - Abnormal; Notable for the following components:       Result Value    ALT 9 (*)     All other components within normal limits   CBC WITH DIFFERENTIAL WITH PLATELET   RAINBOW DRAW BLUE     Imaging Results Available and  Reviewed while in ED:   CTA BRAIN + CTA CAROTIDS (CPT=70496/74864)   Final Result   PROCEDURE: CTA BRAIN + CTA CAROTIDS (CPT=70496/00154)       COMPARISON: None.       INDICATIONS: r/o TIA       TECHNIQUE:   CT images of the neck and brain were obtained with and    without non-ionic intravenous contrast material. Multi-planar    reformatted/3-D images were created to optimize visualization of vascular    anatomy. Automated exposure control for dose    reduction was used. Dose information is transmitted to the ACR (American    College of Radiology) NRDR (National Radiology Data Registry) which    includes the Dose Index Registry.Evaluation of internal carotid stenosis    is based on NASCET Criteria.         FINDINGS:       CAROTID ARTERIES:   RIGHT COMMON CAROTID: No hemodynamically significant stenosis or    dissection.     RIGHT INTERNAL CAROTID: No hemodynamically significant stenosis or    dissection.  Beaded appearance of the distal internal carotid artery.       LEFT COMMON CAROTID: No hemodynamically significant stenosis or    dissection.     LEFT INTERNAL CAROTID: No hemodynamically significant stenosis or    dissection.  Beaded appearance of the distal internal carotid artery.         VERTEBRAL ARTERIES:   RIGHT: No hemodynamically significant stenosis or dissection.     LEFT: No hemodynamically significant stenosis or dissection.         BASILAR ARTERY: No hemodynamically significant stenosis or dissection.             AORTIC ARCH (Limited): Normal.  No aneurysm or dissection.     MEDIASTINUM/APICES (Limited): Normal.  No mass or adenopathy.         OTHER: The visualized soft tissues of the neck are also unremarkable.         INTRACRANIAL ARTERIES: There is atherosclerotic calcifications at the    carotid siphons.   ANTERIOR CEREBRALS:  No significant stenosis.  No visible aneurysm or    vascular malformation.     MIDDLE CEREBRALS: No significant stenosis.  No visible aneurysm or    vascular malformation.     POSTERIOR CEREBRALS: No significant stenosis.  No visible aneurysm or    vascular malformation.        OTHER FINDINGS: Noncontrast portion of the examination shows no midline    shift or acute hemorrhage.  There is low attenuation seen within the    bilateral periventricular white matter consistent with chronic    microvascular ischemic disease.  No large    territorial infarct.                   =====   CONCLUSION:        Mild chronic microvascular ischemic disease without acute intracranial    abnormality.       Beaded appearance of the bilateral internal carotid arteries can be seen    with fibromuscular dysplasia.  No acute dissection.       Patent neck and intracranial vasculature.               Dictated by (CST): Manoj Machado MD on 10/22/2024 at 7:56 PM        Finalized by (CST): Manoj Machado MD on 10/22/2024 at 8:02 PM                   ED Medications Administered:   Medications   iopamidol 76% (ISOVUE-370) injection for power injector (65 mL Intravenous Given 10/22/24 1910)   aspirin chewable tab 81 mg (81 mg Oral Given 10/22/24 2034)       Vitals:    10/22/24 2030 10/22/24 2045 10/22/24 2100 10/22/24 2115   BP: 148/81 138/69 134/78 139/69   Pulse: 62 63 66 63   Resp: 18 18 17 19   Temp:       TempSrc:       SpO2: 97% 98% 98% 98%   Weight:       Height:         *I personally reviewed and interpreted all ED vitals.  EKG    Rate, intervals and axes as noted on EKG Report.  Rate: 59  Rhythm: Sinus Rhythm  Reading: Sinus rhythm, intervals within normal limits, no STEMI         MDM        Medical Decision Making  Differential diagnosis includes but is not limited to electrolyte imbalance, BPPV, arrhythmia, TIA, stroke, malignancy, intracranial hemorrhage    Well-appearing patient, normal neurovascular exam, concern for TIA.  Discussed this with the patient, discussed inpatient versus outpatient workup, patient concerned regarding discharge as she lives alone, discussed with and admitted to hospitalist.   Discussed with Dr. Leo Zepeda, no further recommendations for the emergency department.    Problems Addressed:  TIA (transient ischemic attack): acute illness or injury    Amount and/or Complexity of Data Reviewed  External Data Reviewed: labs.     Details: Increase in hemoglobin, resolution of mild hypokalemia, otherwise CBC and BMP stable compared to 8/29/2024  Labs: ordered.  Radiology: ordered and independent interpretation performed.     Details: CT images reviewed, no obvious intracranial hemorrhage, other incidental findings deferred to radiology  ECG/medicine tests: ordered and independent interpretation performed. Decision-making details documented in ED Course.  Discussion of management or test interpretation with external provider(s): Discussed with hospitalist and neurology        Disposition and Plan     Clinical Impression:  1. TIA (transient ischemic attack)         Disposition:  Admit  10/22/2024  8:23 pm    Follow-up:  No follow-up provider specified.  We recommend that you schedule follow up care with a primary care provider within the next three months to obtain basic health screening including reassessment of your blood pressure.      Medications Prescribed:  Current Discharge Medication List              Supplementary Documentation:         Hospital Problems       Present on Admission  Date Reviewed: 10/10/2024            ICD-10-CM Noted POA    * (Principal) TIA (transient ischemic attack) G45.9 10/22/2024 Unknown

## 2024-10-23 NOTE — PLAN OF CARE
Problem: Patient Centered Care  Goal: Patient preferences are identified and integrated in the patient's plan of care  Description: Interventions:  - What would you like us to know as we care for you? I'm from [facility].  - Provide timely, complete, and accurate information to patient/family  - Incorporate patient and family knowledge, values, beliefs, and cultural backgrounds into the planning and delivery of care  - Encourage patient/family to participate in care and decision-making at the level they choose  - Honor patient and family perspectives and choices  Outcome: Progressing     Problem: Patient/Family Goals  Goal: Patient/Family Long Term Goal  Description: Patient's Long Term Goal: get stronger    Interventions:  - PT/OT  - See additional Care Plan goals for specific interventions  Outcome: Progressing  Goal: Patient/Family Short Term Goal  Description: Patient's Short Term Goal: go home    Interventions:   - Take prescribed medications  - See additional Care Plan goals for specific interventions  Outcome: Progressing     Problem: NEUROLOGICAL - ADULT  Goal: Achieves maximal functionality and self care  Description: INTERVENTIONS  - Monitor swallowing and airway patency with patient fatigue and changes in neurological status  - Encourage and assist patient to increase activity and self care with guidance from PT/OT  - Encourage visually impaired, hearing impaired and aphasic patients to use assistive/communication devices  Outcome: Progressing

## 2024-10-23 NOTE — ED QUICK NOTES
Orders for admission, patient is aware of plan and ready to go upstairs. Any questions, please call ED RN Amena at extension [phone].     Patient Covid vaccination status: Fully vaccinated     COVID Test Ordered in ED: None    COVID Suspicion at Admission: N/A    Running Infusions:  None    Mental Status/LOC at time of transport: A&O x 4    Other pertinent information: Ambulates with walker  CIWA score: N/A   NIH score:  N/A

## 2024-10-23 NOTE — CONSULTS
[facility]  [address] [place] [address], [address]  [facility] [address]  [phone]            Zaida Osuna Patient Status:  Observation    1950 MRN [id number]   Location [facility] 3W/SW Attending Rebekah Chowdary MD   Hosp Day # 0 PCP KEMAR JAY MD     Date of Admission:  10/22/2024  Date of Consult:  10/23/2024  Reason for Consultation:   Weakness in right lower extremity    History of Present Illness:   Patient is a 74 year old female who was admitted to the hospital for TIA (transient ischemic attack):    I am seeing her today for transient weakness in her right lower extremity.  She told me she was not feeling well yesterday and she felt almost about to pass out.  Then she noticed that she was walking with a right lower extremity \" under water\" and she had to drag it at times.   This heavy feeling in the right lower extremity did not resolve so she decided to present to the emergency department for further workup.  This has never occurred to her in the past, she had no symptoms in the right upper extremity, no speech changes, no facial droop.  Denies any seizures  Upon arrival to the emergency department a CT scan of the head was obtained which did not reveal any acute intracranial abnormalities, angiogram of the head and neck revealed beaded appearance of the internal carotid arteries with a differential diagnosis of fibromuscular dysplasia.  She told me that in August she had her total knee replacement on the left and was maintained on aspirin then but she has been off aspirin for the past 6 weeks.  Past medical history positive for hypertension and hypercholesterolemia but she is not diabetic  Her symptoms resolved now  Past Medical History  Past Medical History:    Anxiety state    Back problem    neck pain- pressure and tightness    Essential hypertension    High blood pressure    High cholesterol    Hx of motion sickness    IBS (irritable bowel syndrome)     PONV (postoperative nausea and vomiting)    lasts for hours       Past Surgical History  Past Surgical History:   Procedure Laterality Date    Cataract Left 01/21/2018    Cataract Right 07/31/2018    Hernia surgery  07/11/2016    Hysterectomy  09/08/2020    Total abdom hysterectomy         Family History  Family History   Problem Relation Age of Onset    Breast Cancer Sister         61-69    Breast Cancer Sister 40    Other (Parkinson's disease) Father     Seizure Disorder Daughter     Ovarian Cancer Neg        Social History  Pediatric History   Patient Parents    Not on file     Other Topics Concern    Not on file   Social History Narrative    Not on file           Current Medications:  Current Facility-Administered Medications   Medication Dose Route Frequency    heparin (Porcine) 5000 UNIT/ML injection 5,000 Units  5,000 Units Subcutaneous 2 times per day    acetaminophen (Tylenol Extra Strength) tab 1,000 mg  1,000 mg Oral Daily PRN    amLODIPine (Norvasc) tab 2.5 mg  2.5 mg Oral Daily    atorvastatin (Lipitor) tab 20 mg  20 mg Oral Nightly    cholecalciferol (Vitamin D3) tab 1,000 Units  1,000 Units Oral Daily    sodium chloride 0.9% infusion   Intravenous Continuous    acetaminophen (Tylenol) tab 650 mg  650 mg Oral Q4H PRN    Or    acetaminophen (Tylenol) rectal suppository 650 mg  650 mg Rectal Q4H PRN    ondansetron (Zofran) 4 MG/2ML injection 4 mg  4 mg Intravenous Q6H PRN    metoclopramide (Reglan) 5 mg/mL injection 5 mg  5 mg Intravenous Q8H PRN    aspirin chewable tab 81 mg  81 mg Oral Daily    glycerin-hypromellose- (Artificial Tears) 0.2-0.2-1 % ophthalmic solution 1 drop  1 drop Both Eyes QID PRN     Medications Prior to Admission   Medication Sig    Menatetrenone (VITAMIN K2) 100 MCG Oral Tab Take 1 tablet by mouth daily.    ibuprofen (ADVIL) 200 MG Oral Tab Take 2 tablets (400 mg total) by mouth nightly as needed for Pain.    polypropylene glycol- (SYSTANE ULTRA) 0.4-0.3 %  Ophthalmic Solution Apply 2 drops to eye as needed.    atorvastatin 20 MG Oral Tab Take 1 tablet (20 mg total) by mouth daily.    amLODIPine 2.5 MG Oral Tab Take 1 tablet (2.5 mg total) by mouth daily.    acetaminophen 500 MG Oral Tab Take 2 tablets (1,000 mg total) by mouth daily as needed for Pain.    Cholecalciferol 25 MCG (1000 UT) Oral Tab Take 1 tablet (1,000 Units total) by mouth daily. Vitamin d and k combined    Polyethylene Glycol 3350 (MIRALAX OR) Take 1 Package by mouth daily as needed (constipation).       Allergies  Allergies[1]    Review of Systems:   As in HPI, the rest of the 14 system review was done and was negative    Physical Exam:     Vitals:    10/23/24 0400 10/23/24 0600 10/23/24 0940 10/23/24 0950   BP: 123/66 147/69 137/74 151/72   Pulse: 73 83 67 75   Resp: 16 16 14    Temp: 97.3 °F (36.3 °C) 97.7 °F (36.5 °C) 98.1 °F (36.7 °C)    TempSrc: Oral Oral Oral    SpO2: 98% 99% 99%    Weight:       Height:           General: No apparent distress, well nourished, well groomed.  Head- Normocephalic, atraumatic  Eyes- No redness or swelling  ENT- Hearing intake, smell preserved, normal glutition  Neck- No masses or adenopathy  Cv: pulses were palpable and normal, no cyanosis or edema   Extremities scar of left knee replacement:  Neurological:     Mental Status- Alert and oriented x3.  Normal attention span and concentration  Thought process intact  Memory intact- recent and remote  Mood intact  Fund of knowledge appropriate for education and age    Language intact including: comprehension, naming, repetition, vocabulary    Cranial Nerves:  II.- Visual fields full to confrontation  III, IV, VI- EOM intact, GAVIN  V. Facial sensation intact  VII. Face symmetric, no facial weakness  VIII. Hearing intact.  IX. Palate elevates symmetrically.  XI. Shoulder shrug is intact  XII. Tongue is midline    Motor Exam:  Muscle tone normal  No atrophy or fasciculations  Strength- upper extremities 5/5 proximally  and distally                  - lower  extremities 5/5 proximally and distally    Sensory Exam:  Light touch sensation- intact in all 4 extremities    Deep Tendon Reflexes:  2+ and symmetric      Coordination:  Finger to nose intact        Results:     Laboratory Data:  Lab Results   Component Value Date    WBC 6.9 10/22/2024    HGB 12.1 10/22/2024    HCT 36.8 10/22/2024    .0 10/22/2024    CREATSERUM 0.75 10/22/2024    BUN 12 10/22/2024     10/22/2024    K 4.0 10/22/2024     10/22/2024    CO2 29.0 10/22/2024    GLU 92 10/22/2024    CA 10.3 10/22/2024    ALB 4.6 10/22/2024    ALKPHO 83 10/22/2024    TP 7.1 10/22/2024    AST 22 10/22/2024    ALT 9 (L) 10/22/2024    TSH 0.868 05/23/2023     03/12/2021    DDIMER 1.03 (H) 11/01/2020    MG 1.9 08/29/2024    TROP <0.045 11/01/2020    B12 989 (H) 04/19/2021         Imaging:    CTA BRAIN + CTA CAROTIDS (CPT=70496/72727)    Result Date: 10/22/2024  CONCLUSION:   Mild chronic microvascular ischemic disease without acute intracranial abnormality.  Beaded appearance of the bilateral internal carotid arteries can be seen with fibromuscular dysplasia.  No acute dissection.  Patent neck and intracranial vasculature.    Dictated by (CST): Manoj Machado MD on 10/22/2024 at 7:56 PM     Finalized by (CST): Manoj Machado MD on 10/22/2024 at 8:02 PM         EKG    Result Date: 10/23/2024  Sinus bradycardia Moderate voltage criteria for LVH, may be normal variant ( R in aVL , [place] product ) Borderline ECG No previous ECGs found in [facility]       Impression:     TIA (transient ischemic attack)  I personally reviewed CT angiogram of the head and neck.  There is atherosclerotic disease of both carotid arteries and it is hard to determine if she has fibromuscular dysplasia.  My impression is that this is less likely with the atherosclerotic disease in both carotids.    Recommendations:  1-MRI brain  2-echocardiogram  3-A1c 5.1  4-LDL 77  5-dual antiplatelet therapy  with aspirin 81 mg and Plavix 75 mg for 1 month then stop Plavix and continue aspirin indefinitely  6-atorvastatin 20 mg daily  7-PT/OT/ST  8-chemical DVT prophylaxis with heparin subcu      Will follow results peripherally    Thank you for allowing me to participate in the care of your patient.    Mary Morley MD            [1]   Allergies  Allergen Reactions    Antispasmodic NAUSEA AND VOMITING     pt unsure of reaction    Chlorthalidone DIZZINESS    Belladonna Alk-Phenobarbital UNKNOWN     pt unsure of reaction    Latex RASH

## 2024-10-23 NOTE — PROGRESS NOTES
[facility]  part of [facility]    Progress Note    Zaida Osuna Patient Status:  Observation    1950 MRN [id number]   Location [facility] 3W/SW Attending Rebekah Chowdary MD   Hosp Day # 0 PCP KEMAR JAY MD       Subjective:   Zaida Osuna is feeling ok. Having some pain in the left leg.     Objective:   Blood pressure 122/65, pulse 70, temperature 98.2 °F (36.8 °C), temperature source Oral, resp. rate 16, height 5' 1\" (1.549 m), weight 117 lb 8 oz (53.3 kg), SpO2 97%, not currently breastfeeding.    Physical Exam:    General: No acute distress.   Respiratory: Clear to auscultation bilaterally. No wheezes. No rhonchi.  Cardiovascular: S1, S2. Regular rate and rhythm. No murmurs, rubs or gallops.   Abdomen: Soft, nontender, nondistended.  Positive bowel sounds. No rebound or guarding.  Neurologic: No focal neurological deficits.   Musculoskeletal: Moves all extremities.  Extremities: Left leg swelling     Results:     Lab Results   Component Value Date    WBC 6.9 10/22/2024    HGB 12.1 10/22/2024    HCT 36.8 10/22/2024    .0 10/22/2024    CREATSERUM 0.75 10/22/2024    BUN 12 10/22/2024     10/22/2024    K 4.0 10/22/2024     10/22/2024    CO2 29.0 10/22/2024    GLU 92 10/22/2024    CA 10.3 10/22/2024    ALB 4.6 10/22/2024    ALKPHO 83 10/22/2024    BILT 0.4 10/22/2024    TP 7.1 10/22/2024    AST 22 10/22/2024    ALT 9 (L) 10/22/2024    TSH 0.868 2023     2021    DDIMER 1.03 (H) 2020    MG 1.9 2024    TROP <0.045 2020    B12 989 (H) 2021       CTA BRAIN + CTA CAROTIDS (CPT=70496/98237)    Result Date: 10/22/2024  CONCLUSION:   Mild chronic microvascular ischemic disease without acute intracranial abnormality.  Beaded appearance of the bilateral internal carotid arteries can be seen with fibromuscular dysplasia.  No acute dissection.  Patent neck and intracranial vasculature.    Dictated by (CST): Manoj Machado MD on  10/22/2024 at 7:56 PM     Finalized by (CST): Manoj Mahcado MD on 10/22/2024 at 8:02 PM         EKG    Result Date: 10/23/2024  Sinus bradycardia Moderate voltage criteria for LVH, may be normal variant ( R in aVL , [place] product ) Borderline ECG No previous ECGs found in [facility]      heparin  5,000 Units Subcutaneous 2 times per day    amLODIPine  2.5 mg Oral Daily    atorvastatin  20 mg Oral Nightly    cholecalciferol  1,000 Units Oral Daily    aspirin  81 mg Oral Daily    DULoxetine  20 mg Oral Nightly    clonazePAM  0.5 mg Oral Nightly    clopidogrel  75 mg Oral Daily         Assessment and Plan:     TIA (transient ischemic attack)  - echo pending  - MRI pending  - neuro on consult  - on plavix and aspirin    Major depression  - psych eval     Left leg swelling  - check venous ultrasound         Quality:  DVT Prophylaxis: Heparin  CODE status: Full    MDM .high      JOAQUIN ESPINOSA MD  10/23/24

## 2024-10-23 NOTE — CONSULTS
[facility]  part of [facility]    Report of Consultation    Zaida Osuna Patient Status:  Observation    1950 MRN [id number]   Location [facility] 3W/SW Attending Rebekah Chowdary MD   Hosp Day # 0 PCP KEMAR JAY MD     Date of Admission:  10/22/2024  Date of Consult:  10/23/2024   Reason for Consultation:   Patient presented with right leg numbness, Rebekah Hensley MD requested psychiatric consult for evaluation and advice.    Consult Duration     The patient seen for initial psychiatric consult evaluation.   Record reviewed, communication with attending, communication with RN and patient seen face to face evaluation.    History of Present Illness:   Patient is a 74 year old   female with past medical history of anxiety, hypertension, hypercholesterolemia who was admitted to the hospital for a possible TIA (transient ischemic attack).?  Patient has been demonstrating depressive symptom  Problem requested for evaluation and advice.    According to an interview with the , the patient has been demonstrating feelings of hopelessness after multiple challenging family circumstances. Patient indicated for psych consult for evaluation and advise.    Per chart review, the patient presented to the hospital experiencing transient dizziness and right LE numbness that began yesterday while walking around her neighborhood.    The patient did not receive any psychotropic medications.    Labs and imaging reviewed: CTA Brain showed no acute intracranial abnormalities; internal carotid artery beading bilaterally.    The patient seen today at bedside with the window shade down and the lights off.    The patient is alert and oriented to person, place, date and condition. The patient answers questions and engages in appropriate conversation with no impairment in cognition or distortion in thought process.     The patient reporting feelings of hopelessness, low  mood, low energy, decreased motivation compared to baseline. Says \"I don't have hope.\"    She currently resides at an independent senior living community ([facility]).     She is  and had 3 children, 1 . Her daughter  in  from multiple medical conditions. She has another living daughter who recently moved into a skilled nursing home for seizure and psychiatric care. Her living son was taking care of his sister before she moved into the nursing home. Her ex  is also dead.    The patient reports she \"feels like a failure\" more and more lately. She tries to eat well, exercise, and do what she \"can do\" to isidro off these feelings. She says, \"I can't bring myself over the hill\" to feel better.     The patient endorses the depressive feelings have \"always been there\" but that they have been getting worse. She has less passion in life and often wants to stay in her room. This concerns her since she says she used to be quite active.     She endorses feeling dizzy and occasional numbness with \"brain pressure\" about 6 months ago.     The patient has tried talk therapy before, but has not wanted to start antidepressive medication therapy because she is afraid of potential side effects.    The patient reporting increased anxiety, worry, ruminations with impairment in sleep. She is able to get to sleep, but wakes up every 2 hours to go to the bathroom, and sometimes has trouble getting back to sleep with increased worrisome thoughts.    The patient is not demonstrating any antony or psychosis.  The patient denies auditory or visual hallucinations.  The patient endorses occasional intrusive thoughts about not wanting to live, but says she is able to overcome them. She denies any attempts at suicide.    The patient has been recommended for medication management and therapy multiple times in the past.  Patient reported that she was able to go to partial hospital program in [facility] but she  refused to be on medication?  Patient reporting that she currently do talk therapy but even with that she continue to be in the same place of depression and dysfunction.      The patient has been demonstrating feelings of hopelessness, helplessness, worthlessness, low mood, low energy, decreased motivation with increased anxiety, worry, ruminations with impairment in sleep. The patient denies any suicidal ideations. The patient is agreeable to medications changes and to follow up with outpatient psychiatry providers.     Past Psychiatric/Medication History:  1. Prior diagnoses: Anxiety, depression  2. Past psychiatric inpatient: The patient denies   3. Past outpatient history: Patient attempted PHP.  The patient has seen a talk therapist before. She denies previous medication management  4. Past suicide history: None   5. Medication history: The patient denies.    Social History:   The patient is . She has 2 living adult children, and 1  adult child ( ).     Family History:  Depression in mother and both daughters.  Medical History:   Past Medical History  Past Medical History:    Anxiety state    Back problem    neck pain- pressure and tightness    Essential hypertension    High blood pressure    High cholesterol    Hx of motion sickness    IBS (irritable bowel syndrome)    PONV (postoperative nausea and vomiting)    lasts for hours       Past Surgical History  Past Surgical History:   Procedure Laterality Date    Cataract Left 2018    Cataract Right 2018    Hernia surgery  2016    Hysterectomy  2020    Total abdom hysterectomy         Family History  Family History   Problem Relation Age of Onset    Breast Cancer Sister         61-69    Breast Cancer Sister 40    Other (Parkinson's disease) Father     Seizure Disorder Daughter     Ovarian Cancer Neg        Social History  Social History     Socioeconomic History    Marital status: Single   Tobacco Use    Smoking  status: Never     Passive exposure: Yes    Smokeless tobacco: Never   Vaping Use    Vaping status: Never Used   Substance and Sexual Activity    Alcohol use: Never    Drug use: Never     Social Drivers of Health     Food Insecurity: No Food Insecurity (10/23/2024)    Food Insecurity     Food Insecurity: Never true   Transportation Needs: No Transportation Needs (10/23/2024)    Transportation Needs     Lack of Transportation: No   Recent Concern: Transportation Needs - Unmet Transportation Needs (8/23/2024)    Transportation Needs     Lack of Transportation: Yes   Housing Stability: Low Risk  (10/23/2024)    Housing Stability     Housing Instability: No           Current Medications:  Current Facility-Administered Medications   Medication Dose Route Frequency    heparin (Porcine) 5000 UNIT/ML injection 5,000 Units  5,000 Units Subcutaneous 2 times per day    acetaminophen (Tylenol Extra Strength) tab 1,000 mg  1,000 mg Oral Daily PRN    amLODIPine (Norvasc) tab 2.5 mg  2.5 mg Oral Daily    atorvastatin (Lipitor) tab 20 mg  20 mg Oral Nightly    cholecalciferol (Vitamin D3) tab 1,000 Units  1,000 Units Oral Daily    sodium chloride 0.9% infusion   Intravenous Continuous    acetaminophen (Tylenol) tab 650 mg  650 mg Oral Q4H PRN    Or    acetaminophen (Tylenol) rectal suppository 650 mg  650 mg Rectal Q4H PRN    ondansetron (Zofran) 4 MG/2ML injection 4 mg  4 mg Intravenous Q6H PRN    metoclopramide (Reglan) 5 mg/mL injection 5 mg  5 mg Intravenous Q8H PRN    aspirin chewable tab 81 mg  81 mg Oral Daily    glycerin-hypromellose- (Artificial Tears) 0.2-0.2-1 % ophthalmic solution 1 drop  1 drop Both Eyes QID PRN    DULoxetine (Cymbalta) DR cap 20 mg  20 mg Oral Nightly    clonazePAM (KlonoPIN) tab 0.5 mg  0.5 mg Oral Nightly     Medications Prior to Admission   Medication Sig    Menatetrenone (VITAMIN K2) 100 MCG Oral Tab Take 1 tablet by mouth daily.    ibuprofen (ADVIL) 200 MG Oral Tab Take 2 tablets (400 mg  total) by mouth nightly as needed for Pain.    polypropylene glycol- (SYSTANE ULTRA) 0.4-0.3 % Ophthalmic Solution Apply 2 drops to eye as needed.    atorvastatin 20 MG Oral Tab Take 1 tablet (20 mg total) by mouth daily.    amLODIPine 2.5 MG Oral Tab Take 1 tablet (2.5 mg total) by mouth daily.    acetaminophen 500 MG Oral Tab Take 2 tablets (1,000 mg total) by mouth daily as needed for Pain.    Cholecalciferol 25 MCG (1000 UT) Oral Tab Take 1 tablet (1,000 Units total) by mouth daily. Vitamin d and k combined    Polyethylene Glycol 3350 (MIRALAX OR) Take 1 Package by mouth daily as needed (constipation).       Allergies  Allergies[1]    Review of Systems:   As by Admitting/Attending    Results:   Laboratory Data:  Lab Results   Component Value Date    WBC 6.9 10/22/2024    HGB 12.1 10/22/2024    HCT 36.8 10/22/2024    .0 10/22/2024    CREATSERUM 0.75 10/22/2024    BUN 12 10/22/2024     10/22/2024    K 4.0 10/22/2024     10/22/2024    CO2 29.0 10/22/2024    GLU 92 10/22/2024    CA 10.3 10/22/2024    ALB 4.6 10/22/2024    ALKPHO 83 10/22/2024    TP 7.1 10/22/2024    AST 22 10/22/2024    ALT 9 (L) 10/22/2024    TSH 0.868 05/23/2023     03/12/2021    DDIMER 1.03 (H) 11/01/2020    MG 1.9 08/29/2024    TROP <0.045 11/01/2020    B12 989 (H) 04/19/2021         Imaging:  CTA BRAIN + CTA CAROTIDS (CPT=70496/54334)    Result Date: 10/22/2024  CONCLUSION:   Mild chronic microvascular ischemic disease without acute intracranial abnormality.  Beaded appearance of the bilateral internal carotid arteries can be seen with fibromuscular dysplasia.  No acute dissection.  Patent neck and intracranial vasculature.    Dictated by (CST): Manoj Machado MD on 10/22/2024 at 7:56 PM     Finalized by (CST): Manoj Machado MD on 10/22/2024 at 8:02 PM           Vital Signs:   Blood pressure 151/72, pulse 75, temperature 98.1 °F (36.7 °C), temperature source Oral, resp. rate 14, height 61\", weight 53.3 kg (117 lb 8  oz), SpO2 99%, not currently breastfeeding.    Mental Status Exam:   Appearance: Stated age 74, in hospital gown, laying down in hospital bed.  Psychomotor: No psychomotor agitation, or retardation.   Orientation: Alert and oriented to person, place, time and condition.  Gait: Not evaluated.  Attitude/Coorperation: Cooperative and attentive.  Behavior: Appropriate.  Speech: Regular rate and rhythm speech.  Mood: Patient reporting depressed and anxious mood.  Affect: Anxious affect, congruent with the mood.  Thought process: Linear and appropriate.  Thought content: Patient denies any suicidal or homicidal ideation.  Perceptions: Patient denies any auditory or visual hallucinations.  Concentration: Grossly intact.  Memory: Grossly intact.  Intellect: Fair, aware of, and concerned for, her depressed mood and anxiety symptoms.  Judgment and Insight: Fair judgment and insight.  Patient aware of her depression anxiety otherwise has been consistently refusing medication in the past.    Impression:     Major depressive disorder, recurrent moderate.  Anxiety with somatization.    TIA (transient ischemic attack)      Patient is a 74 year old , , female with past medical history of anxiety, hypertension, hypercholesterolemia who was admitted to the hospital for a transient ischemic attack.    The patient has been demonstrating feelings of constant sadness, hopelessness, low mood, low energy, decreased motivation, anhedonia with increased anxiety, worry, and impaired sleep. The patient reports feeling like she can't break through these feelings. She endorses having them as long as she can remember, but that they have been worsened by increased familial stressors the past 4 years since the death of her adult daughter. She denies any thoughts of suicide. She is agreeable to medication changes.    The patient has a tendency of holding into her emotion and commonly expressing her emotion and physical  manifestation.  Brain CT scan with mild vascular ischemic changes.  I strongly believe her physical symptom has psychological basis.    Discussed risk and benefit, acknowledging the current symptom and severity.  At this point, I would recommend the following approach:     Focus on safety. Patient is low risk.  Focus on education and support.  Focus on insight orientation helping the patient understand diagnosis and treatment plan.  Start Cymbalta 20 mg nightly  Start Klonopin 0.5 mg nightly  Processed with patient at length, the initiation of the above psychotropic medications I advised the patient of the risks, benefits, alternatives and potential side effects. The patient consents to administration of the medications and understands the right to refuse medications at any time. The patient verbalized understanding.   Coordinate plan with team    Orders This Visit:  Orders Placed This Encounter   Procedures    CBC With Differential With Platelet    Comp Metabolic Panel (14)    Comp Metabolic Panel (14)    Lipid Panel    Hemoglobin A1C       Meds This Visit:  Requested Prescriptions      No prescriptions requested or ordered in this encounter       Logan Gallegos MD  10/23/2024    Note to Patient: The 21st Century Cures Act makes medical notes like these available to patients in the interest of transparency. However, be advised this is a medical document. It is intended as peer to peer communication. It is written in medical language and may contain abbreviations or verbiage that are unfamiliar. It may appear blunt or direct. Medical documents are intended to carry relevant information, facts as evident, and the clinical opinion of the practitioner. This note may have been transcribed using a voice dictation system. Voice recognition errors may occur. This should not be taken to alter the content or meaning of this note.            [1]   Allergies  Allergen Reactions    Antispasmodic NAUSEA AND VOMITING     pt  unsure of reaction    Chlorthalidone DIZZINESS    Belladonna Alk-Phenobarbital UNKNOWN     pt unsure of reaction    Latex RASH

## 2024-10-23 NOTE — PROGRESS NOTES
1300-This RN notified MRI department pt is ready for scan and pending discharge, MRI checklist completed, able to transport via wheelchair.    Darlyn DENTON RN

## 2024-10-24 ENCOUNTER — APPOINTMENT (OUTPATIENT)
Dept: ULTRASOUND IMAGING | Facility: HOSPITAL | Age: 74
End: 2024-10-24
Attending: HOSPITALIST
Payer: MEDICARE

## 2024-10-24 LAB
ALBUMIN SERPL-MCNC: 4 G/DL (ref 3.2–4.8)
ALBUMIN/GLOB SERPL: 1.8 {RATIO} (ref 1–2)
ALP LIVER SERPL-CCNC: 78 U/L
ALT SERPL-CCNC: <7 U/L
ANION GAP SERPL CALC-SCNC: 7 MMOL/L (ref 0–18)
AST SERPL-CCNC: 14 U/L (ref ?–34)
BILIRUB SERPL-MCNC: 0.5 MG/DL (ref 0.2–1.1)
BUN BLD-MCNC: 13 MG/DL (ref 9–23)
BUN/CREAT SERPL: 18.3 (ref 10–20)
CALCIUM BLD-MCNC: 9.4 MG/DL (ref 8.7–10.4)
CHLORIDE SERPL-SCNC: 109 MMOL/L (ref 98–112)
CO2 SERPL-SCNC: 27 MMOL/L (ref 21–32)
CREAT BLD-MCNC: 0.71 MG/DL
DEPRECATED RDW RBC AUTO: 45.1 FL (ref 35.1–46.3)
EGFRCR SERPLBLD CKD-EPI 2021: 89 ML/MIN/1.73M2 (ref 60–?)
ERYTHROCYTE [DISTWIDTH] IN BLOOD BY AUTOMATED COUNT: 13.1 % (ref 11–15)
GLOBULIN PLAS-MCNC: 2.2 G/DL (ref 2–3.5)
GLUCOSE BLD-MCNC: 111 MG/DL (ref 70–99)
GLUCOSE BLDC GLUCOMTR-MCNC: 83 MG/DL (ref 70–99)
HCT VFR BLD AUTO: 33 %
HGB BLD-MCNC: 11.2 G/DL
MCH RBC QN AUTO: 31.9 PG (ref 26–34)
MCHC RBC AUTO-ENTMCNC: 33.9 G/DL (ref 31–37)
MCV RBC AUTO: 94 FL
OSMOLALITY SERPL CALC.SUM OF ELEC: 297 MOSM/KG (ref 275–295)
PLATELET # BLD AUTO: 274 10(3)UL (ref 150–450)
POTASSIUM SERPL-SCNC: 3.9 MMOL/L (ref 3.5–5.1)
PROT SERPL-MCNC: 6.2 G/DL (ref 5.7–8.2)
RBC # BLD AUTO: 3.51 X10(6)UL
SODIUM SERPL-SCNC: 143 MMOL/L (ref 136–145)
WBC # BLD AUTO: 5.6 X10(3) UL (ref 4–11)

## 2024-10-24 PROCEDURE — 93970 EXTREMITY STUDY: CPT | Performed by: HOSPITALIST

## 2024-10-24 PROCEDURE — 99233 SBSQ HOSP IP/OBS HIGH 50: CPT | Performed by: HOSPITALIST

## 2024-10-24 PROCEDURE — 99233 SBSQ HOSP IP/OBS HIGH 50: CPT | Performed by: OTHER

## 2024-10-24 RX ORDER — DULOXETIN HYDROCHLORIDE 30 MG/1
30 CAPSULE, DELAYED RELEASE ORAL NIGHTLY
Status: DISCONTINUED | OUTPATIENT
Start: 2024-10-24 | End: 2024-10-25

## 2024-10-24 NOTE — PROGRESS NOTES
Patient received to unit, oriented to room. Vital signs stable. Meal provided. Call light within reach, fall precautions in place, patient calling appropriately.

## 2024-10-24 NOTE — PROGRESS NOTES
[facility]  [address] [place] [address], [address]  [facility] [address]  [phone]            Zaida Osuna Patient Status:  Inpatient    1950 MRN [id number]   Location [facility] 3W/SW Attending Rebekah Chowdary MD   Hosp Day # 1 PCP KEMAR JAY MD     Subjective:  Zaida Osuna is a(n) 74 year old female.  Feeling better today.  Saw psychiatry yesterday.  She was started on clonazepam and recommended Cymbalta.  They felt her symptoms might have psychological basis        Current Facility-Administered Medications   Medication Dose Route Frequency    heparin (Porcine) 5000 UNIT/ML injection 5,000 Units  5,000 Units Subcutaneous 2 times per day    acetaminophen (Tylenol Extra Strength) tab 1,000 mg  1,000 mg Oral Daily PRN    amLODIPine (Norvasc) tab 2.5 mg  2.5 mg Oral Daily    atorvastatin (Lipitor) tab 20 mg  20 mg Oral Nightly    cholecalciferol (Vitamin D3) tab 1,000 Units  1,000 Units Oral Daily    sodium chloride 0.9% infusion   Intravenous Continuous    acetaminophen (Tylenol) tab 650 mg  650 mg Oral Q4H PRN    Or    acetaminophen (Tylenol) rectal suppository 650 mg  650 mg Rectal Q4H PRN    ondansetron (Zofran) 4 MG/2ML injection 4 mg  4 mg Intravenous Q6H PRN    metoclopramide (Reglan) 5 mg/mL injection 5 mg  5 mg Intravenous Q8H PRN    aspirin chewable tab 81 mg  81 mg Oral Daily    glycerin-hypromellose- (Artificial Tears) 0.2-0.2-1 % ophthalmic solution 1 drop  1 drop Both Eyes QID PRN    DULoxetine (Cymbalta) DR cap 20 mg  20 mg Oral Nightly    clonazePAM (KlonoPIN) tab 0.5 mg  0.5 mg Oral Nightly    clopidogrel (Plavix) tab 75 mg  75 mg Oral Daily       Objective:  Blood pressure 132/63, pulse 58, temperature 97.5 °F (36.4 °C), temperature source Oral, resp. rate 18, height 61\", weight 117 lb 8 oz (53.3 kg), SpO2 97%, not currently breastfeeding.    Physical Exam:  Vitals:    10/24/24 0000 10/24/24 0400 10/24/24 0440 10/24/24 0800   BP: 150/73 147/88   132/63   Pulse: 66 71  58   Resp: 18 18  18   Temp: 97.5 °F (36.4 °C) 97.7 °F (36.5 °C)  97.5 °F (36.4 °C)   TempSrc: Oral Oral  Oral   SpO2: 100% 98%  97%   Weight:   117 lb 8 oz (53.3 kg)    Height:           General: No apparent distress, well nourished, well groomed.  Extremities: left lower extremity edema    Neurological:     Mental Status- Alert and oriented x3.      Language intact including: comprehension, naming, repetition, vocabulary    Cranial Nerves:  II.- Visual fields full to confrontation  III, IV, VI- EOM intact, GAVIN  VII. Face symmetric, no facial weakness  XI. Shoulder shrug is intact  XII. Tongue is midline    Motor Exam:  Muscle tone normal  No atrophy or fasciculations  Strength- upper extremities 5/5 proximally and distally                  - lower  extremities 5/5 proximally and distally    Sensory Exam:  Light touch sensation- intact in all 4 extremities    Deep Tendon Reflexes:  Left knee replacement scar surgery, deep tendon reflexes 2+ and symmetric elsewhere    Coordination:  Finger to nose intact      Lab Results   Component Value Date    WBC 5.6 10/24/2024    HGB 11.2 (L) 10/24/2024    HCT 33.0 (L) 10/24/2024    .0 10/24/2024    CREATSERUM 0.71 10/24/2024    BUN 13 10/24/2024     10/24/2024    K 3.9 10/24/2024     10/24/2024    CO2 27.0 10/24/2024     (H) 10/24/2024    CA 9.4 10/24/2024    ALB 4.0 10/24/2024    ALKPHO 78 10/24/2024    BILT 0.5 10/24/2024    TP 6.2 10/24/2024    AST 14 10/24/2024    ALT <7 (L) 10/24/2024    TSH 0.868 05/23/2023     03/12/2021    DDIMER 1.03 (H) 11/01/2020    MG 1.9 08/29/2024    TROP <0.045 11/01/2020    B12 989 (H) 04/19/2021       MRI BRAIN (CPT=70551)    Result Date: 10/24/2024  CONCLUSION: No acute intracranial process.  No evidence acute or subacute infarct. There are mild microvascular white matter ischemic changes, likely related to long-standing hypertension and/or diabetes.   [facility] provided a  preliminary report for this examination. This final report agrees with their preliminary findings.   Dictated by (CST): Can Pitt MD on 10/24/2024 at 6:15 AM     Finalized by (CST): Can Pitt MD on 10/24/2024 at 6:16 AM          CTA BRAIN + CTA CAROTIDS (CPT=70496/68364)    Result Date: 10/22/2024  CONCLUSION:   Mild chronic microvascular ischemic disease without acute intracranial abnormality.  Beaded appearance of the bilateral internal carotid arteries can be seen with fibromuscular dysplasia.  No acute dissection.  Patent neck and intracranial vasculature.    Dictated by (CST): Manoj Machado MD on 10/22/2024 at 7:56 PM     Finalized by (CST): Manoj Machado MD on 10/22/2024 at 8:02 PM         EKG    Result Date: 10/23/2024  Sinus bradycardia Moderate voltage criteria for LVH, may be normal variant ( R in aVL , [place]ji product ) Borderline ECG No previous ECGs found in [facility] Confirmed by DO Franco Asif ([id number]) on 10/23/2024 6:43:00 PM       Assessment:  Patient Active Problem List   Diagnosis    Mixed hyperlipidemia    Osteopenia with high risk of fracture    Right shoulder pain    Gastroesophageal reflux disease    Hematuria    Primary osteoarthritis of left knee    Essential hypertension    TIA (transient ischemic attack)    Anxiety with somatization    Major depressive disorder, recurrent episode, moderate (HCC)    TIA (transient ischemic attack)  I personally reviewed CT angiogram of the head and neck.  There is atherosclerotic disease of both carotid arteries and it is hard to determine if she has fibromuscular dysplasia.  My impression is that this is less likely with the atherosclerotic disease in both carotids.     Recommendations:  1-MRI brain negative for acute strokes  2-echocardiogram with EF 60-65%. Septal aneurysm with ?patent foramen ovale  3-A1c 5.1  4-LDL 77  5-dual antiplatelet therapy with aspirin 81 mg and Plavix 75 mg for 1 month then stop Plavix and continue aspirin 81mg  indefinitely  6-atorvastatin 20 mg daily  7-PT/OT/ST  8-chemical DVT prophylaxis with heparin subcu  9- Recommend checking DVT in her left lower extremity, edema on exam      Neurology signing off. Will need follow up in outpatient neurology clinic in 4-6 weeks            Mary Morley MD

## 2024-10-24 NOTE — PLAN OF CARE
Pt alert and oriented x4. Independent. Neuro Qshift. Pt updated on plan of care. Plan for US legs. Safety precautions in place. Call light within reach.  Pt transferring to [facility], report given to Isabel RAMOS.    Problem: Patient Centered Care  Goal: Patient preferences are identified and integrated in the patient's plan of care  Description: Interventions:  - What would you like us to know as we care for you? I'm from [facility].  - Provide timely, complete, and accurate information to patient/family  - Incorporate patient and family knowledge, values, beliefs, and cultural backgrounds into the planning and delivery of care  - Encourage patient/family to participate in care and decision-making at the level they choose  - Honor patient and family perspectives and choices  10/24/2024 1238 by Puja Tarango RN  Outcome: Progressing  10/24/2024 1238 by Puja Tarango RN  Outcome: Progressing     Problem: Patient/Family Goals  Goal: Patient/Family Long Term Goal  Description: Patient's Long Term Goal: get stronger    Interventions:  - PT/OT  - See additional Care Plan goals for specific interventions  10/24/2024 1238 by Puja Tarango RN  Outcome: Progressing  10/24/2024 1238 by Puja Tarango RN  Outcome: Progressing  Goal: Patient/Family Short Term Goal  Description: Patient's Short Term Goal: go home    Interventions:   - Take prescribed medications  - See additional Care Plan goals for specific interventions  10/24/2024 1238 by Puja Tarango RN  Outcome: Progressing  10/24/2024 1238 by Puja Tarango RN  Outcome: Progressing     Problem: NEUROLOGICAL - ADULT  Goal: Achieves maximal functionality and self care  Description: INTERVENTIONS  - Monitor swallowing and airway patency with patient fatigue and changes in neurological status  - Encourage and assist patient to increase activity and self care with guidance from PT/OT  - Encourage visually impaired, hearing impaired and aphasic patients to use  assistive/communication devices  10/24/2024 1238 by Puja Tarango, RN  Outcome: Progressing  10/24/2024 1238 by Puja Tarango, RN  Outcome: Progressing     Problem: SAFETY ADULT - FALL  Goal: Free from fall injury  Description: INTERVENTIONS:  - Assess pt frequently for physical needs  - Identify cognitive and physical deficits and behaviors that affect risk of falls.  - [place] fall precautions as indicated by assessment.  - Educate pt/family on patient safety including physical limitations  - Instruct pt to call for assistance with activity based on assessment  - Modify environment to reduce risk of injury  - Provide assistive devices as appropriate  - Consider OT/PT consult to assist with strengthening/mobility  - Encourage toileting schedule  Outcome: Progressing     Problem: DISCHARGE PLANNING  Goal: Discharge to home or other facility with appropriate resources  Description: INTERVENTIONS:  - Identify barriers to discharge w/pt and caregiver  - Include patient/family/discharge partner in discharge planning  - Arrange for needed discharge resources and transportation as appropriate  - Identify discharge learning needs (meds, wound care, etc)  - Arrange for interpreters to assist at discharge as needed  - Consider post-discharge preferences of patient/family/discharge partner  - Complete POLST form as appropriate  - Assess patient's ability to be responsible for managing their own health  - Refer to Case Management Department for coordinating discharge planning if the patient needs post-hospital services based on physician/LIP order or complex needs related to functional status, cognitive ability or social support system  Outcome: Progressing

## 2024-10-24 NOTE — PROGRESS NOTES
Patient is a 74 year old   female with past medical history of anxiety, hypertension, hypercholesterolemia who was admitted to the hospital for a possible TIA (transient ischemic attack). Patient has been demonstrating depressive symptom. Problem requested for evaluation and advice.    Consult Duration   The patient seen for over 50-minute, follow-up evaluation, over 50% counseling and coordinating care addressing anxiety and depression.  Record reviewed, communication with attending, communication with RN and patient seen face to face evaluation.     History of Present Illness:     Communicate with team that she will need continued PT services at discharge due to decreased strength and endurance, according to PT notes.    Patient seen yesterday and started on Cymbalta cap 20 mg nightly, Klonopin tab 0.5 mg nightly.  Patient took her medication.    Vital signs: /63; HR 58    The patient seen today at bedside. She is reporting decreased numbness and tingling today. She slept well except for during interruptions from nursing staff.     The patient is open to continuing the new medications and working with her same outpatient talk therapist. She is wanting to start singing again, and expresses hope that getting her mood ian will help her take better care of herself.    The patient is alert and oriented to person, place, date and condition. The patient answers questions and engages in appropriate conversation with no impairment in cognition or distortion in thought process.     The patient is not demonstrating any antony or psychosis.  The patient denies auditory or visual hallucinations.  The patient denies suicidal or homicidal thoughts.    The patient has been demonstrating feelings of hopelessness, helplessness, worthlessness, low mood, low energy, decreased motivation with increased anxiety, worry, ruminations with impairment in sleep. The patient denies any suicidal ideations. The patient  is agreeable to medication changes and to follow up with outpatient psychiatry providers.     Labs and imaging reviewed: Glucose 83; Sodium 143; Potassium 3.9; Hg 11.2;     MRI Brain - No acute or subacute infarct. There are mild microvascular white matter ischemic changes.    Past Psychiatric/Medication History:  1. Prior diagnoses: Anxiety, depression  2. Past psychiatric inpatient: The patient denies.   3. Past outpatient history: Patient attempted PHP.  The patient has seen a talk therapist before. She denies previous medication management  4. Past suicide history: None   5. Medication history: The patient denies.    Social History:   The patient is . She has 2 living adult children, and 1  adult child ( ).     Family History:  Depression in mother and both daughters.  Medical History:   Past Medical History  Past Medical History:    Anxiety state    Back problem    neck pain- pressure and tightness    Essential hypertension    High blood pressure    High cholesterol    Hx of motion sickness    IBS (irritable bowel syndrome)    PONV (postoperative nausea and vomiting)    lasts for hours       Past Surgical History  Past Surgical History:   Procedure Laterality Date    Cataract Left 2018    Cataract Right 2018    Hernia surgery  2016    Hysterectomy  2020    Total abdom hysterectomy         Family History  Family History   Problem Relation Age of Onset    Breast Cancer Sister         61-69    Breast Cancer Sister 40    Other (Parkinson's disease) Father     Seizure Disorder Daughter     Ovarian Cancer Neg        Social History  Social History     Socioeconomic History    Marital status: Single   Tobacco Use    Smoking status: Never     Passive exposure: Yes    Smokeless tobacco: Never   Vaping Use    Vaping status: Never Used   Substance and Sexual Activity    Alcohol use: Never    Drug use: Never     Social Drivers of Health     Food Insecurity: No Food Insecurity  (10/23/2024)    Food Insecurity     Food Insecurity: Never true   Transportation Needs: No Transportation Needs (10/23/2024)    Transportation Needs     Lack of Transportation: No   Recent Concern: Transportation Needs - Unmet Transportation Needs (8/23/2024)    Transportation Needs     Lack of Transportation: Yes   Housing Stability: Low Risk  (10/23/2024)    Housing Stability     Housing Instability: No           Current Medications:  Current Facility-Administered Medications   Medication Dose Route Frequency    heparin (Porcine) 5000 UNIT/ML injection 5,000 Units  5,000 Units Subcutaneous 2 times per day    acetaminophen (Tylenol Extra Strength) tab 1,000 mg  1,000 mg Oral Daily PRN    amLODIPine (Norvasc) tab 2.5 mg  2.5 mg Oral Daily    atorvastatin (Lipitor) tab 20 mg  20 mg Oral Nightly    cholecalciferol (Vitamin D3) tab 1,000 Units  1,000 Units Oral Daily    sodium chloride 0.9% infusion   Intravenous Continuous    acetaminophen (Tylenol) tab 650 mg  650 mg Oral Q4H PRN    Or    acetaminophen (Tylenol) rectal suppository 650 mg  650 mg Rectal Q4H PRN    ondansetron (Zofran) 4 MG/2ML injection 4 mg  4 mg Intravenous Q6H PRN    metoclopramide (Reglan) 5 mg/mL injection 5 mg  5 mg Intravenous Q8H PRN    aspirin chewable tab 81 mg  81 mg Oral Daily    glycerin-hypromellose- (Artificial Tears) 0.2-0.2-1 % ophthalmic solution 1 drop  1 drop Both Eyes QID PRN    DULoxetine (Cymbalta) DR cap 20 mg  20 mg Oral Nightly    clonazePAM (KlonoPIN) tab 0.5 mg  0.5 mg Oral Nightly    clopidogrel (Plavix) tab 75 mg  75 mg Oral Daily     Medications Prior to Admission   Medication Sig    Menatetrenone (VITAMIN K2) 100 MCG Oral Tab Take 1 tablet by mouth daily.    ibuprofen (ADVIL) 200 MG Oral Tab Take 2 tablets (400 mg total) by mouth nightly as needed for Pain.    polypropylene glycol- (SYSTANE ULTRA) 0.4-0.3 % Ophthalmic Solution Apply 2 drops to eye as needed.    atorvastatin 20 MG Oral Tab Take 1 tablet (20  mg total) by mouth daily.    amLODIPine 2.5 MG Oral Tab Take 1 tablet (2.5 mg total) by mouth daily.    acetaminophen 500 MG Oral Tab Take 2 tablets (1,000 mg total) by mouth daily as needed for Pain.    Cholecalciferol 25 MCG (1000 UT) Oral Tab Take 1 tablet (1,000 Units total) by mouth daily. Vitamin d and k combined    Polyethylene Glycol 3350 (MIRALAX OR) Take 1 Package by mouth daily as needed (constipation).       Allergies  Allergies[1]    Review of Systems:   As by Admitting/Attending    Results:   Laboratory Data:  Lab Results   Component Value Date    WBC 5.6 10/24/2024    HGB 11.2 (L) 10/24/2024    HCT 33.0 (L) 10/24/2024    .0 10/24/2024    CREATSERUM 0.71 10/24/2024    BUN 13 10/24/2024     10/24/2024    K 3.9 10/24/2024     10/24/2024    CO2 27.0 10/24/2024     (H) 10/24/2024    CA 9.4 10/24/2024    ALB 4.0 10/24/2024    ALKPHO 78 10/24/2024    TP 6.2 10/24/2024    AST 14 10/24/2024    ALT <7 (L) 10/24/2024    TSH 0.868 05/23/2023     03/12/2021    DDIMER 1.03 (H) 11/01/2020    MG 1.9 08/29/2024    TROP <0.045 11/01/2020    B12 989 (H) 04/19/2021         Imaging:  MRI BRAIN (CPT=70551)    Result Date: 10/24/2024  CONCLUSION: No acute intracranial process.  No evidence acute or subacute infarct. There are mild microvascular white matter ischemic changes, likely related to long-standing hypertension and/or diabetes.   [facility] provided a preliminary report for this examination. This final report agrees with their preliminary findings.   Dictated by (CST): Can Pitt MD on 10/24/2024 at 6:15 AM     Finalized by (CST): Can Pitt MD on 10/24/2024 at 6:16 AM          CTA BRAIN + CTA CAROTIDS (CPT=70496/58575)    Result Date: 10/22/2024  CONCLUSION:   Mild chronic microvascular ischemic disease without acute intracranial abnormality.  Beaded appearance of the bilateral internal carotid arteries can be seen with fibromuscular dysplasia.  No acute dissection.   Patent neck and intracranial vasculature.    Dictated by (CST): Manoj Machado MD on 10/22/2024 at 7:56 PM     Finalized by (CST): Manoj Machado MD on 10/22/2024 at 8:02 PM           Vital Signs:   Blood pressure 132/63, pulse 58, temperature 97.5 °F (36.4 °C), temperature source Oral, resp. rate 18, height 61\", weight 53.3 kg (117 lb 8 oz), SpO2 97%, not currently breastfeeding.    Mental Status Exam:   Appearance: Stated age female, in hospital gown, laying down in hospital bed.  Psychomotor: No psychomotor agitation, or retardation.   Orientation: Alert and oriented to person, place, time and condition.  Gait: Not evaluated.  Attitude/Coorperation: Cooperative and attentive.  Behavior: Appropriate.  Speech: Regular rate and rhythm speech.  Mood: Patient reporting depressed and anxious mood.  Affect: Anxious affect, congruent with the mood.  Thought process: Circumstantial thought process.  Thought content: Patient denies any suicidal or homicidal ideation.  Perceptions: Patient denies any auditory or visual hallucinations.  Concentration: Grossly intact.  Memory: Grossly intact.  Intellect: Fair, aware of, and concerned for, her depressed mood and anxiety symptoms.  Judgment and Insight: Fair judgment and insight.  Patient aware of her depression anxiety otherwise has been consistently refusing medication in the past.    Impression:     Major depressive disorder, recurrent moderate.  Anxiety with somatization.    TIA (transient ischemic attack)      Patient is a 74 year old , , female with past medical history of anxiety, hypertension, hypercholesterolemia who was admitted to the hospital for a transient ischemic attack.    The patient has been demonstrating feelings of constant sadness, hopelessness, low mood, low energy, decreased motivation, anhedonia with increased anxiety, worry, and impaired sleep. The patient reports feeling like she can't break through these feelings. She endorses having  them as long as she can remember, but that they have been worsened by increased familial stressors the past 4 years since the death of her adult daughter. She denies any thoughts of suicide. She is agreeable to medication changes.    The patient has a tendency of holding into her emotion and commonly expressing her emotion and physical manifestation.  Brain CT scan with mild vascular ischemic changes. I strongly believe her physical symptom has psychological basis.      10/24/2024 - Seen today from bed with the curtains down. She is still expressing anxious and depressive symptoms. Numbness and tingling are decreased, and she is open to continue psychiatric treatment.    Discussed risk and benefit, acknowledging the current symptom and severity.  At this point, I would recommend the following approach:     Focus on safety. Patient is low risk.  Focus on education and support.  Focus on insight orientation helping the patient understand diagnosis and treatment plan.  Increase Cymbalta to 30 mg-  Continue Klonopin 0.5 mg nightly  Processed with patient at length, the initiation of the above psychotropic medications I advised the patient of the risks, benefits, alternatives and potential side effects. The patient consents to administration of the medications and understands the right to refuse medications at any time. The patient verbalized understanding.   Coordinate plan with team.  Refer to outpatient psychiatry.    Orders This Visit:  Orders Placed This Encounter   Procedures    CBC With Differential With Platelet    Comp Metabolic Panel (14)    Comp Metabolic Panel (14)    Lipid Panel    Hemoglobin A1C    CBC, Platelet; No Differential       Meds This Visit:  Requested Prescriptions      No prescriptions requested or ordered in this encounter       Logan Gallegos MD  10/24/2024    Note to Patient: The 21st Century Cures Act makes medical notes like these available to patients in the interest of transparency.  However, be advised this is a medical document. It is intended as peer to peer communication. It is written in medical language and may contain abbreviations or verbiage that are unfamiliar. It may appear blunt or direct. Medical documents are intended to carry relevant information, facts as evident, and the clinical opinion of the practitioner. This note may have been transcribed using a voice dictation system. Voice recognition errors may occur. This should not be taken to alter the content or meaning of this note.            [1]   Allergies  Allergen Reactions    Antispasmodic NAUSEA AND VOMITING     pt unsure of reaction    Chlorthalidone DIZZINESS    Belladonna Alk-Phenobarbital UNKNOWN     pt unsure of reaction    Latex RASH

## 2024-10-24 NOTE — PROGRESS NOTES
[facility]  part of [facility]    Progress Note    Zaida Osuna Patient Status:  Observation    1950 MRN [id number]   Location [facility] 3W/SW Attending Rebekah Chowdary MD   Hosp Day # 1 PCP KEMAR JAY MD       Subjective:   Zaida Osuna is having some pain in the left leg. Noticing pins and needle feeling in her right leg as well.  No other acute complaints.     Objective:   Blood pressure 132/63, pulse 58, temperature 97.5 °F (36.4 °C), temperature source Oral, resp. rate 18, height 5' 1\" (1.549 m), weight 117 lb 8 oz (53.3 kg), SpO2 97%, not currently breastfeeding.    Physical Exam:    General: No acute distress.   Respiratory: Clear to auscultation bilaterally. No wheezes. No rhonchi.  Cardiovascular: S1, S2. Regular rate and rhythm. No murmurs, rubs or gallops.   Abdomen: Soft, nontender, nondistended.  Positive bowel sounds. No rebound or guarding.  Neurologic: No focal neurological deficits.   Musculoskeletal: Moves all extremities.  Extremities: Left leg swelling     Results:     Lab Results   Component Value Date    WBC 5.6 10/24/2024    HGB 11.2 (L) 10/24/2024    HCT 33.0 (L) 10/24/2024    .0 10/24/2024    CREATSERUM 0.71 10/24/2024    BUN 13 10/24/2024     10/24/2024    K 3.9 10/24/2024     10/24/2024    CO2 27.0 10/24/2024     (H) 10/24/2024    CA 9.4 10/24/2024    ALB 4.0 10/24/2024    ALKPHO 78 10/24/2024    BILT 0.5 10/24/2024    TP 6.2 10/24/2024    AST 14 10/24/2024    ALT <7 (L) 10/24/2024    TSH 0.868 2023     2021    DDIMER 1.03 (H) 2020    MG 1.9 2024    TROP <0.045 2020    B12 989 (H) 2021       MRI BRAIN (CPT=70551)    Result Date: 10/24/2024  CONCLUSION: No acute intracranial process.  No evidence acute or subacute infarct. There are mild microvascular white matter ischemic changes, likely related to long-standing hypertension and/or diabetes.   [facility] provided a  preliminary report for this examination. This final report agrees with their preliminary findings.   Dictated by (CST): Can Pitt MD on 10/24/2024 at 6:15 AM     Finalized by (CST): Can Pitt MD on 10/24/2024 at 6:16 AM          CTA BRAIN + CTA CAROTIDS (CPT=70496/41645)    Result Date: 10/22/2024  CONCLUSION:   Mild chronic microvascular ischemic disease without acute intracranial abnormality.  Beaded appearance of the bilateral internal carotid arteries can be seen with fibromuscular dysplasia.  No acute dissection.  Patent neck and intracranial vasculature.    Dictated by (CST): Manoj Machado MD on 10/22/2024 at 7:56 PM     Finalized by (CST): Manoj Machado MD on 10/22/2024 at 8:02 PM         EKG    Result Date: 10/23/2024  Sinus bradycardia Moderate voltage criteria for LVH, may be normal variant ( R in aVL , Reji product ) Borderline ECG No previous ECGs found in [facility] Confirmed by DO Franco Asif ([id number]) on 10/23/2024 6:43:00 PM      heparin  5,000 Units Subcutaneous 2 times per day    amLODIPine  2.5 mg Oral Daily    atorvastatin  20 mg Oral Nightly    cholecalciferol  1,000 Units Oral Daily    aspirin  81 mg Oral Daily    DULoxetine  20 mg Oral Nightly    clonazePAM  0.5 mg Oral Nightly    clopidogrel  75 mg Oral Daily         Assessment and Plan:     TIA (transient ischemic attack)  - echo reviewed  - MRI reviewed- negative for stroke  - neuro on consult  - on plavix and aspirin    Major depression  - psych eval     Left leg swelling  - check bilaterall ultrasound          Quality:  DVT Prophylaxis: Heparin  CODE status: Full    MDM .high      JOAQUIN ESPINOSA MD  10/24/24

## 2024-10-24 NOTE — PLAN OF CARE
Problem: Patient Centered Care  Goal: Patient preferences are identified and integrated in the patient's plan of care  Description: Interventions:  - What would you like us to know as we care for you? I'm from [facility].  - Provide timely, complete, and accurate information to patient/family  - Incorporate patient and family knowledge, values, beliefs, and cultural backgrounds into the planning and delivery of care  - Encourage patient/family to participate in care and decision-making at the level they choose  - Honor patient and family perspectives and choices  Outcome: Progressing     Problem: Patient/Family Goals  Goal: Patient/Family Long Term Goal  Description: Patient's Long Term Goal: get stronger    Interventions:  - PT/OT  - See additional Care Plan goals for specific interventions  Outcome: Progressing  Goal: Patient/Family Short Term Goal  Description: Patient's Short Term Goal: go home    Interventions:   - Take prescribed medications  - See additional Care Plan goals for specific interventions  Outcome: Progressing     Problem: NEUROLOGICAL - ADULT  Goal: Achieves maximal functionality and self care  Description: INTERVENTIONS  - Monitor swallowing and airway patency with patient fatigue and changes in neurological status  - Encourage and assist patient to increase activity and self care with guidance from PT/OT  - Encourage visually impaired, hearing impaired and aphasic patients to use assistive/communication devices  Outcome: Progressing

## 2024-10-25 VITALS
DIASTOLIC BLOOD PRESSURE: 61 MMHG | OXYGEN SATURATION: 98 % | HEIGHT: 61 IN | BODY MASS INDEX: 22.19 KG/M2 | SYSTOLIC BLOOD PRESSURE: 120 MMHG | HEART RATE: 67 BPM | WEIGHT: 117.5 LBS | RESPIRATION RATE: 18 BRPM | TEMPERATURE: 98 F

## 2024-10-25 LAB
ANION GAP SERPL CALC-SCNC: 4 MMOL/L (ref 0–18)
BUN BLD-MCNC: 18 MG/DL (ref 9–23)
BUN/CREAT SERPL: 24.3 (ref 10–20)
CALCIUM BLD-MCNC: 9.6 MG/DL (ref 8.7–10.4)
CHLORIDE SERPL-SCNC: 110 MMOL/L (ref 98–112)
CO2 SERPL-SCNC: 28 MMOL/L (ref 21–32)
CREAT BLD-MCNC: 0.74 MG/DL
DEPRECATED RDW RBC AUTO: 44.7 FL (ref 35.1–46.3)
EGFRCR SERPLBLD CKD-EPI 2021: 85 ML/MIN/1.73M2 (ref 60–?)
ERYTHROCYTE [DISTWIDTH] IN BLOOD BY AUTOMATED COUNT: 13 % (ref 11–15)
GLUCOSE BLD-MCNC: 97 MG/DL (ref 70–99)
HCT VFR BLD AUTO: 33.7 %
HGB BLD-MCNC: 10.9 G/DL
MCH RBC QN AUTO: 30.4 PG (ref 26–34)
MCHC RBC AUTO-ENTMCNC: 32.3 G/DL (ref 31–37)
MCV RBC AUTO: 93.9 FL
OSMOLALITY SERPL CALC.SUM OF ELEC: 296 MOSM/KG (ref 275–295)
PLATELET # BLD AUTO: 256 10(3)UL (ref 150–450)
POTASSIUM SERPL-SCNC: 4.5 MMOL/L (ref 3.5–5.1)
RBC # BLD AUTO: 3.59 X10(6)UL
SODIUM SERPL-SCNC: 142 MMOL/L (ref 136–145)
WBC # BLD AUTO: 5.4 X10(3) UL (ref 4–11)

## 2024-10-25 PROCEDURE — 99239 HOSP IP/OBS DSCHRG MGMT >30: CPT | Performed by: HOSPITALIST

## 2024-10-25 PROCEDURE — 99233 SBSQ HOSP IP/OBS HIGH 50: CPT | Performed by: OTHER

## 2024-10-25 RX ORDER — DULOXETIN HYDROCHLORIDE 30 MG/1
30 CAPSULE, DELAYED RELEASE ORAL NIGHTLY
Qty: 30 CAPSULE | Refills: 0 | Status: SHIPPED | OUTPATIENT
Start: 2024-10-25

## 2024-10-25 RX ORDER — ASPIRIN 81 MG/1
81 TABLET, CHEWABLE ORAL DAILY
Qty: 30 TABLET | Refills: 0 | Status: SHIPPED | OUTPATIENT
Start: 2024-10-25

## 2024-10-25 RX ORDER — CLOPIDOGREL BISULFATE 75 MG/1
75 TABLET ORAL DAILY
Qty: 30 TABLET | Refills: 0 | Status: SHIPPED | OUTPATIENT
Start: 2024-10-25

## 2024-10-25 RX ORDER — CLONAZEPAM 0.5 MG/1
0.5 TABLET ORAL NIGHTLY
Qty: 30 TABLET | Refills: 0 | Status: SHIPPED | OUTPATIENT
Start: 2024-10-25

## 2024-10-25 NOTE — PLAN OF CARE
Problem: Patient Centered Care  Goal: Patient preferences are identified and integrated in the patient's plan of care  Description: Interventions:  - What would you like us to know as we care for you? I'm from [facility].  - Provide timely, complete, and accurate information to patient/family  - Incorporate patient and family knowledge, values, beliefs, and cultural backgrounds into the planning and delivery of care  - Encourage patient/family to participate in care and decision-making at the level they choose  - Honor patient and family perspectives and choices  Outcome: Progressing     Problem: Patient/Family Goals  Goal: Patient/Family Long Term Goal  Description: Patient's Long Term Goal: get stronger    Interventions:  - PT/OT  - See additional Care Plan goals for specific interventions  Outcome: Progressing  Goal: Patient/Family Short Term Goal  Description: Patient's Short Term Goal: go home    Interventions:   - Take prescribed medications  - See additional Care Plan goals for specific interventions  Outcome: Progressing     Problem: NEUROLOGICAL - ADULT  Goal: Achieves maximal functionality and self care  Description: INTERVENTIONS  - Monitor swallowing and airway patency with patient fatigue and changes in neurological status  - Encourage and assist patient to increase activity and self care with guidance from PT/OT  - Encourage visually impaired, hearing impaired and aphasic patients to use assistive/communication devices  Outcome: Progressing     Problem: SAFETY ADULT - FALL  Goal: Free from fall injury  Description: INTERVENTIONS:  - Assess pt frequently for physical needs  - Identify cognitive and physical deficits and behaviors that affect risk of falls.  - [place] fall precautions as indicated by assessment.  - Educate pt/family on patient safety including physical limitations  - Instruct pt to call for assistance with activity based on assessment  - Modify environment to reduce risk of injury  -  Provide assistive devices as appropriate  - Consider OT/PT consult to assist with strengthening/mobility  - Encourage toileting schedule  Outcome: Progressing     Problem: DISCHARGE PLANNING  Goal: Discharge to home or other facility with appropriate resources  Description: INTERVENTIONS:  - Identify barriers to discharge w/pt and caregiver  - Include patient/family/discharge partner in discharge planning  - Arrange for needed discharge resources and transportation as appropriate  - Identify discharge learning needs (meds, wound care, etc)  - Arrange for interpreters to assist at discharge as needed  - Consider post-discharge preferences of patient/family/discharge partner  - Complete POLST form as appropriate  - Assess patient's ability to be responsible for managing their own health  - Refer to Case Management Department for coordinating discharge planning if the patient needs post-hospital services based on physician/LIP order or complex needs related to functional status, cognitive ability or social support system  Outcome: Progressing     No acute changes today. Qshift neuro checks. US of BLE neg. Plans to discharge back to [facility] independent living today. Call light within reach, frequent rounding.

## 2024-10-25 NOTE — PROGRESS NOTES
Patient is a 74 year old   female with past medical history of anxiety, hypertension, hypercholesterolemia who was admitted to the hospital for a possible TIA (transient ischemic attack). Patient has been demonstrating depressive symptom. Problem requested for evaluation and advice.    Consult Duration     The patient seen for over 50-minute, follow-up evaluation, over 50% counseling and coordinating care addressing anxiety and depression.    Record reviewed, communication with attending, communication with RN and patient seen face to face evaluation.     History of Present Illness:     Communicating with the team, she was having some left leg pain, and pins and needles sensation in her right leg yesterday in the afternoon, which subsided in the evening. Underwent doppler ultrasound lower extremity.    Patient seen yesterday and changed to Cymbalta cap to 30 mg nightly, continued on Klonopin tab 0.5 mg nightly. Patient took her medication.    Vital signs: /76; HR 64    The patient seen today sitting in the couch.  She is reporting leg pain, numbness, and tingling today. She slept very well. She is open to continuing medication and talk therapy treatment outpatient.    The patient is alert and oriented to person, place, date and condition. The patient answers questions and engages in appropriate conversation with no impairment in cognition or distortion in thought process.     Patient reporting that she is feeling much better.  Patient informed about all the negative testing including CT scan, MRI and ultrasound.  Patient reporting \"maybe my symptom is because of my anxiety\".    Patient started demonstrate more insight to her condition and demonstrated more cooperation and interest in the follow-up.  Patient believes the medication has been helping her significantly and patient has been talking about doing positive things to regain her physical and emotional wellbeing.    Patient continue  demonstrating some anxiety with excessive worry and racing thoughts but otherwise admitting feeling hopeful.    Patient denied any homicidal or suicidal ideation.  Patient denied any side effect from the medication.  Patient agreeable to follow-up as outpatient in the [facility] hospital program.    Labs and imaging reviewed: Glucose 97; Sodium 142; Potassium 4.5; Hg 10.9     US Venous Doppler Leg Bilateral - No bilateral lower extremity DVT.    Past Psychiatric/Medication History:  1. Prior diagnoses: Anxiety, depression  2. Past psychiatric inpatient: The patient denies.   3. Past outpatient history: Patient attempted PHP.  The patient has seen a talk therapist before. She denies previous medication management  4. Past suicide history: None   5. Medication history: The patient denies.    Social History:   The patient is . She has 2 living adult children, and 1  adult child ( ).     Family History:  Depression in mother and both daughters.  Medical History:   Past Medical History  Past Medical History:    Anxiety state    Back problem    neck pain- pressure and tightness    Essential hypertension    High blood pressure    High cholesterol    Hx of motion sickness    IBS (irritable bowel syndrome)    PONV (postoperative nausea and vomiting)    lasts for hours       Past Surgical History  Past Surgical History:   Procedure Laterality Date    Cataract Left 2018    Cataract Right 2018    Hernia surgery  2016    Hysterectomy  2020    Total abdom hysterectomy         Family History  Family History   Problem Relation Age of Onset    Breast Cancer Sister         61-69    Breast Cancer Sister 40    Other (Parkinson's disease) Father     Seizure Disorder Daughter     Ovarian Cancer Neg        Social History  Social History     Socioeconomic History    Marital status: Single   Tobacco Use    Smoking status: Never     Passive exposure: Yes    Smokeless tobacco: Never   Vaping Use     Vaping status: Never Used   Substance and Sexual Activity    Alcohol use: Never    Drug use: Never     Social Drivers of Health     Food Insecurity: No Food Insecurity (10/23/2024)    Food Insecurity     Food Insecurity: Never true   Transportation Needs: No Transportation Needs (10/23/2024)    Transportation Needs     Lack of Transportation: No   Recent Concern: Transportation Needs - Unmet Transportation Needs (8/23/2024)    Transportation Needs     Lack of Transportation: Yes   Housing Stability: Low Risk  (10/23/2024)    Housing Stability     Housing Instability: No           Current Medications:  Current Facility-Administered Medications   Medication Dose Route Frequency    DULoxetine (Cymbalta) DR cap 30 mg  30 mg Oral Nightly    heparin (Porcine) 5000 UNIT/ML injection 5,000 Units  5,000 Units Subcutaneous 2 times per day    acetaminophen (Tylenol Extra Strength) tab 1,000 mg  1,000 mg Oral Daily PRN    amLODIPine (Norvasc) tab 2.5 mg  2.5 mg Oral Daily    atorvastatin (Lipitor) tab 20 mg  20 mg Oral Nightly    cholecalciferol (Vitamin D3) tab 1,000 Units  1,000 Units Oral Daily    acetaminophen (Tylenol) tab 650 mg  650 mg Oral Q4H PRN    Or    acetaminophen (Tylenol) rectal suppository 650 mg  650 mg Rectal Q4H PRN    ondansetron (Zofran) 4 MG/2ML injection 4 mg  4 mg Intravenous Q6H PRN    metoclopramide (Reglan) 5 mg/mL injection 5 mg  5 mg Intravenous Q8H PRN    aspirin chewable tab 81 mg  81 mg Oral Daily    glycerin-hypromellose- (Artificial Tears) 0.2-0.2-1 % ophthalmic solution 1 drop  1 drop Both Eyes QID PRN    clonazePAM (KlonoPIN) tab 0.5 mg  0.5 mg Oral Nightly    clopidogrel (Plavix) tab 75 mg  75 mg Oral Daily     Medications Prior to Admission   Medication Sig    Menatetrenone (VITAMIN K2) 100 MCG Oral Tab Take 1 tablet by mouth daily.    ibuprofen (ADVIL) 200 MG Oral Tab Take 2 tablets (400 mg total) by mouth nightly as needed for Pain.    polypropylene glycol- (SYSTANE ULTRA)  0.4-0.3 % Ophthalmic Solution Apply 2 drops to eye as needed.    atorvastatin 20 MG Oral Tab Take 1 tablet (20 mg total) by mouth daily.    amLODIPine 2.5 MG Oral Tab Take 1 tablet (2.5 mg total) by mouth daily.    acetaminophen 500 MG Oral Tab Take 2 tablets (1,000 mg total) by mouth daily as needed for Pain.    Cholecalciferol 25 MCG (1000 UT) Oral Tab Take 1 tablet (1,000 Units total) by mouth daily. Vitamin d and k combined    Polyethylene Glycol 3350 (MIRALAX OR) Take 1 Package by mouth daily as needed (constipation).       Allergies  Allergies[1]    Review of Systems:   As by Admitting/Attending    Results:   Laboratory Data:  Lab Results   Component Value Date    WBC 5.4 10/25/2024    HGB 10.9 (L) 10/25/2024    HCT 33.7 (L) 10/25/2024    .0 10/25/2024    CREATSERUM 0.74 10/25/2024    BUN 18 10/25/2024     10/25/2024    K 4.5 10/25/2024     10/25/2024    CO2 28.0 10/25/2024    GLU 97 10/25/2024    CA 9.6 10/25/2024    ALB 4.0 10/24/2024    ALKPHO 78 10/24/2024    TP 6.2 10/24/2024    AST 14 10/24/2024    ALT <7 (L) 10/24/2024    TSH 0.868 05/23/2023     03/12/2021    DDIMER 1.03 (H) 11/01/2020    MG 1.9 08/29/2024    TROP <0.045 11/01/2020    B12 989 (H) 04/19/2021         Imaging:  US VENOUS DOPPLER LEG BILAT - DIAG IMG (CPT=93970)    Result Date: 10/24/2024  CONCLUSION: No bilateral lower extremity DVT.    Dictated by (CST): Phillip Meza MD on 10/24/2024 at 6:40 PM     Finalized by (CST): Phillip Meza MD on 10/24/2024 at 6:41 PM          MRI BRAIN (CPT=70551)    Result Date: 10/24/2024  CONCLUSION: No acute intracranial process.  No evidence acute or subacute infarct. There are mild microvascular white matter ischemic changes, likely related to long-standing hypertension and/or diabetes.   [facility] provided a preliminary report for this examination. This final report agrees with their preliminary findings.   Dictated by (CST): Can Pitt MD on 10/24/2024 at 6:15  AM     Finalized by (CST): Can Pitt MD on 10/24/2024 at 6:16 AM          CTA BRAIN + CTA CAROTIDS (CPT=70496/55039)    Result Date: 10/22/2024  CONCLUSION:   Mild chronic microvascular ischemic disease without acute intracranial abnormality.  Beaded appearance of the bilateral internal carotid arteries can be seen with fibromuscular dysplasia.  No acute dissection.  Patent neck and intracranial vasculature.    Dictated by (CST): Manoj Machado MD on 10/22/2024 at 7:56 PM     Finalized by (CST): Manoj Machado MD on 10/22/2024 at 8:02 PM           Vital Signs:   Blood pressure 120/61, pulse 67, temperature 98 °F (36.7 °C), temperature source Oral, resp. rate 18, height 61\", weight 53.3 kg (117 lb 8 oz), SpO2 98%, not currently breastfeeding.    Mental Status Exam:   Appearance: Stated age female, in hospital gown, laying down in hospital bed.  Psychomotor: No psychomotor agitation, or retardation.   Orientation: Alert and oriented to person, place, time and condition.  Gait: Not evaluated.  Attitude/Coorperation: Cooperative and attentive.  Behavior: Appropriate.  Speech: Regular rate and rhythm speech.  Mood: Patient reporting depressed and anxious mood.  Affect: Anxious affect, congruent with the mood.  Thought process: Circumstantial thought process.  Thought content: Patient denies any suicidal or homicidal ideation.  Perceptions: Patient denies any auditory or visual hallucinations.  Concentration: Grossly intact.  Memory: Grossly intact.  Intellect: Fair, aware of, and concerned for, her depressed mood and anxiety symptoms.  Judgment and Insight: Fair judgment and insight.  Patient aware of her depression anxiety otherwise has been consistently refusing medication in the past.    Impression:     Major depressive disorder, recurrent moderate.  Anxiety with somatization.    TIA (transient ischemic attack)      Patient is a 74 year old , , female with past medical history of anxiety, hypertension,  hypercholesterolemia who was admitted to the hospital for a transient ischemic attack.    The patient has been demonstrating feelings of constant sadness, hopelessness, low mood, low energy, decreased motivation, anhedonia with increased anxiety, worry, and impaired sleep. The patient reports feeling like she can't break through these feelings. She endorses having them as long as she can remember, but that they have been worsened by increased familial stressors the past 4 years since the death of her adult daughter. She denies any thoughts of suicide. She is agreeable to medication changes.    The patient has a tendency of holding into her emotion and commonly expressing her emotion and physical manifestation.  Brain CT scan with mild vascular ischemic changes. I strongly believe her physical symptom has psychological basis.      10/24/2024 - Seen today from bed with the curtains down. She is still expressing anxious and depressive symptoms. Numbness and tingling are decreased, and she is open to continue psychiatric treatment.    10/25/2024 -patient today demonstrating improvement in her mood and demeanor.  Otherwise more testing has been done when her symptom is related to anxiety.  Communicate with attending and discussed diagnosis and eliminating the reinforcement.      Discussed risk and benefit, acknowledging the current symptom and severity.  At this point, I would recommend the following approach:     Focus on safety. Patient is low risk.  Focus on education and support.  Focus on insight orientation helping the patient understand diagnosis and treatment plan.  Continue Cymbalta 30 mg nightly  Continue Klonopin 0.5 mg nightly.  Communicate with psych liaison to provide her follow-up with PHP program.  Communicated with attending to encourage discharge  Refer to outpatient psychiatry.    Orders This Visit:  Orders Placed This Encounter   Procedures    CBC With Differential With Platelet    Comp Metabolic  Panel (14)    Comp Metabolic Panel (14)    Lipid Panel    Hemoglobin A1C    CBC, Platelet; No Differential    CBC, Platelet; No Differential    Basic Metabolic Panel (8)       Meds This Visit:  Requested Prescriptions     Signed Prescriptions Disp Refills    clonazePAM 0.5 MG Oral Tab 30 tablet 0     Sig: Take 1 tablet (0.5 mg total) by mouth nightly.    aspirin 81 MG Oral Chew Tab 30 tablet 0     Sig: Chew 1 tablet (81 mg total) by mouth daily.    clopidogrel 75 MG Oral Tab 30 tablet 0     Sig: Take 1 tablet (75 mg total) by mouth daily.    DULoxetine 30 MG Oral Cap DR Particles 30 capsule 0     Sig: Take 1 capsule (30 mg total) by mouth at bedtime.       Logan Gallegos MD  10/25/2024    Note to Patient: The 21st Century Cures Act makes medical notes like these available to patients in the interest of transparency. However, be advised this is a medical document. It is intended as peer to peer communication. It is written in medical language and may contain abbreviations or verbiage that are unfamiliar. It may appear blunt or direct. Medical documents are intended to carry relevant information, facts as evident, and the clinical opinion of the practitioner. This note may have been transcribed using a voice dictation system. Voice recognition errors may occur. This should not be taken to alter the content or meaning of this note.            [1]   Allergies  Allergen Reactions    Antispasmodic NAUSEA AND VOMITING     pt unsure of reaction    Chlorthalidone DIZZINESS    Belladonna Alk-Phenobarbital UNKNOWN     pt unsure of reaction    Latex RASH

## 2024-10-25 NOTE — PLAN OF CARE
Patient is alert & oriented x4. On room air. Vitals stable. Denies pain. Tolerating diet. Voiding freely. No bowel movement overnight. Up independently. Will continue to monitor      Problem: Patient Centered Care  Goal: Patient preferences are identified and integrated in the patient's plan of care  Description: Interventions:  - What would you like us to know as we care for you? I'm from [facility].  - Provide timely, complete, and accurate information to patient/family  - Incorporate patient and family knowledge, values, beliefs, and cultural backgrounds into the planning and delivery of care  - Encourage patient/family to participate in care and decision-making at the level they choose  - Honor patient and family perspectives and choices  Outcome: Progressing     Problem: Patient/Family Goals  Goal: Patient/Family Long Term Goal  Description: Patient's Long Term Goal: get stronger    Interventions:  - PT/OT  - See additional Care Plan goals for specific interventions  Outcome: Progressing  Goal: Patient/Family Short Term Goal  Description: Patient's Short Term Goal: go home    Interventions:   - Take prescribed medications  - See additional Care Plan goals for specific interventions  Outcome: Progressing     Problem: NEUROLOGICAL - ADULT  Goal: Achieves maximal functionality and self care  Description: INTERVENTIONS  - Monitor swallowing and airway patency with patient fatigue and changes in neurological status  - Encourage and assist patient to increase activity and self care with guidance from PT/OT  - Encourage visually impaired, hearing impaired and aphasic patients to use assistive/communication devices  Outcome: Progressing     Problem: SAFETY ADULT - FALL  Goal: Free from fall injury  Description: INTERVENTIONS:  - Assess pt frequently for physical needs  - Identify cognitive and physical deficits and behaviors that affect risk of falls.  - [place] fall precautions as indicated by assessment.  - Educate  pt/family on patient safety including physical limitations  - Instruct pt to call for assistance with activity based on assessment  - Modify environment to reduce risk of injury  - Provide assistive devices as appropriate  - Consider OT/PT consult to assist with strengthening/mobility  - Encourage toileting schedule  Outcome: Progressing     Problem: DISCHARGE PLANNING  Goal: Discharge to home or other facility with appropriate resources  Description: INTERVENTIONS:  - Identify barriers to discharge w/pt and caregiver  - Include patient/family/discharge partner in discharge planning  - Arrange for needed discharge resources and transportation as appropriate  - Identify discharge learning needs (meds, wound care, etc)  - Arrange for interpreters to assist at discharge as needed  - Consider post-discharge preferences of patient/family/discharge partner  - Complete POLST form as appropriate  - Assess patient's ability to be responsible for managing their own health  - Refer to Case Management Department for coordinating discharge planning if the patient needs post-hospital services based on physician/LIP order or complex needs related to functional status, cognitive ability or social support system  Outcome: Progressing

## 2024-10-25 NOTE — PHYSICAL THERAPY NOTE
PHYSICAL THERAPY TREATMENT NOTE - INPATIENT     Room Number: 466/466-A       Presenting Problem: transient weakness in RLE and dizziness  Co-Morbidities : HLD, HTN, anxiety    Problem List  Principal Problem:    TIA (transient ischemic attack)  Active Problems:    Anxiety with somatization    Major depressive disorder, recurrent episode, moderate (HCC)      PHYSICAL THERAPY ASSESSMENT   Patient demonstrates good  progress this session, goals  progressing with bed mobility, transfers and ambulation this session.     Patient is requiring supervision as a result of the following impairments: medical status.     Patient continues to function near baseline with bed mobility, transfers, and gait.  Pt demos adequate safety and stability with functional mobility tasks and is safe to to discharge from PT standpoint.  Physical Therapy will continue to follow patient for duration of hospitalization.    Patient continues to benefit from continued skilled PT services: at discharge to promote prior level of function and safety with additional support and return home with OP PT.    PLAN DURING HOSPITALIZATION  Nursing Mobility Recommendation : 1 Assist  PT Device Recommendation: Rolling walker  PT Treatment Plan: Bed mobility;Body mechanics;Patient education;Energy conservation;Family education;Gait training;Range of motion;Strengthening;Transfer training;Balance training;Stoop training;Stair training  Frequency (Obs): 3-5x/week     SUBJECTIVE  Agreeable to therapy     OBJECTIVE  Precautions: None        PAIN ASSESSMENT   Ratin  Location: denies       BALANCE  Static Sitting: Normal  Dynamic Sitting: Good  Static Standing: Fair +  Dynamic Standing: Fair        AM-PAC '6-Clicks' INPATIENT SHORT FORM - BASIC MOBILITY  How much difficulty does the patient currently have...  Patient Difficulty: Turning over in bed (including adjusting bedclothes, sheets and blankets)?: None   Patient Difficulty: Sitting down on and standing up  from a chair with arms (e.g., wheelchair, bedside commode, etc.): A Little   Patient Difficulty: Moving from lying on back to sitting on the side of the bed?: None   How much help from another person does the patient currently need...   Help from Another: Moving to and from a bed to a chair (including a wheelchair)?: A Little   Help from Another: Need to walk in hospital room?: A Little   Help from Another: Climbing 3-5 steps with a railing?: A Little     AM-PAC Score:  Raw Score: 20   Approx Degree of Impairment: 35.83%   Standardized Score (AM-PAC Scale): 47.67   CMS Modifier (G-Code): CJ    FUNCTIONAL ABILITY STATUS  Functional Mobility/Gait Assessment  Gait Assistance: Supervision  Distance (ft): 450 ft  Assistive Device: Rolling walker  Pattern:  (slight lateral veering)  Supine to Sit: independent  Sit to Supine: independent  Sit to Stand: supervision to RW    Skilled Therapy Provided: Pt rec'd in bed agreeable to therapy, identified by name and , gait belt donned for mobility. Pt with gait progression this session, able to ambulate community distances with RW, slight lateral veering with RW but no overt LOB or gait deviations noted. Pt ed provided on importance of OOB mobility once home and use of RW as needed for balance.        The patient's Approx Degree of Impairment: 35.83% has been calculated based on documentation in the [facility] '6 clicks' Inpatient Daily Activity Short Form.  Research supports that patients with this level of impairment may benefit from HHPT.  Final disposition will be made by interdisciplinary medical team.      Patient End of Session: In bed;Needs met;Call light within reach;RN aware of session/findings    CURRENT GOALS   Goals to be met by: 10/30/24  Patient Goal Patient's self-stated goal is: return to PLOF   Goal #1 Patient is able to demonstrate supine - sit EOB @ level: independent      Goal #1   Current Status GOAL MET   Goal #2 Patient is able to demonstrate transfers Sit  to/from Stand at assistance level: modified independent with walker - rolling      Goal #2  Current Status  in progress   Goal #3 Patient is able to ambulate 300 feet with assist device: walker - rolling at assistance level: modified independent   Goal #3   Current Status  in progress   Goal #4 Patient will participate in 10 minutes of functional standing dynamic balance activities requiring fall ready guarding to prepare for home DC   Goal #4   Current Status  in progress   Goal #5 Patient to demonstrate independence with home activity/exercise instructions provided to patient in preparation for discharge.   Goal #5   Current Status  in progress     Gait Training: 15 minutes

## 2024-10-25 NOTE — DISCHARGE SUMMARY
[facility]  part of [facility]    Discharge Summary    Zaida Osuna Patient Status:  Inpatient    1950 MRN [id number]   Location [facility] 4W/SW/SE Attending Rebekah Chowdary MD   Hosp Day # 2 PCP KEMAR JAY MD     Date of Admission: 10/22/2024   Date of Discharge: 10/25/2024    Hospital Discharge Diagnoses: Acute TIA    Lace+ Score: 52  59-90 High Risk  29-58 Medium Risk  0-28   Low Risk.    TCM Follow-Up Recommendation:  LACE 29-58: Moderate Risk of readmission after discharge from the hospital.        Admitting Diagnosis: TIA (transient ischemic attack) [G45.9]    Disposition: Home    Discharge Diagnosis: .Principal Problem:    TIA (transient ischemic attack)  Active Problems:    Anxiety with somatization    Major depressive disorder, recurrent episode, moderate (HCC)      Hospital Course:   Reason for Admission:   74 year old with hx of HLD, HTN, anxiety presenting for symptoms of dizziness, right LE numbness that began around 1-2 pm today.  Symptoms were transient.  Continues to reports not \"feeling well\"     In the ED hemodynamically stable.  CBC and CMP stable.   CTA brain and carotids completed.  Beaded interntal appearance of carotid arteries concerning for fibromuscular dysplasia.  No other acute findings.      Discharge Physical Exam:   Physical Exam:    General: No acute distress.   Respiratory: Clear to auscultation bilaterally. No wheezes. No rhonchi.  Cardiovascular: S1, S2. Regular rate and rhythm. No murmurs, rubs or gallops.   Abdomen: Soft, nontender, nondistended.  Positive bowel sounds. No rebound or guarding.  Neurologic: No focal neurological deficits.   Musculoskeletal: Moves all extremities.    Hospital Course:      TIA (transient ischemic attack)  - echo reviewed  - MRI reviewed- negative for stroke  - neuro on consult  - on plavix and aspirin- continue at discharge     Major depression  Acute somatization  - psych eval   - continue on klonopin and  cymbalta     Left leg swelling  - check bilaterall ultrasound              Quality:  DVT Prophylaxis: Heparin  CODE status: Full     MDM .high    Complications: none    Consultants         Provider   Role Specialty     Logan Gallegos MD      Consulting Physician Psychiatry     Mary Livingston MD      Consulting Physician NEUROLOGY                Discharge Plan:   Discharge Condition: Stable    Current Discharge Medication List        New Orders    Details   clonazePAM 0.5 MG Oral Tab Take 1 tablet (0.5 mg total) by mouth nightly.      aspirin 81 MG Oral Chew Tab Chew 1 tablet (81 mg total) by mouth daily.      clopidogrel 75 MG Oral Tab Take 1 tablet (75 mg total) by mouth daily.      DULoxetine 30 MG Oral Cap DR Particles Take 1 capsule (30 mg total) by mouth at bedtime.           Home Meds - Unchanged    Details   Menatetrenone (VITAMIN K2) 100 MCG Oral Tab Take 1 tablet by mouth daily.      ibuprofen (ADVIL) 200 MG Oral Tab Take 2 tablets (400 mg total) by mouth nightly as needed for Pain.      polypropylene glycol- (SYSTANE ULTRA) 0.4-0.3 % Ophthalmic Solution Apply 2 drops to eye as needed.      atorvastatin 20 MG Oral Tab Take 1 tablet (20 mg total) by mouth daily.      amLODIPine 2.5 MG Oral Tab Take 1 tablet (2.5 mg total) by mouth daily.      acetaminophen 500 MG Oral Tab Take 2 tablets (1,000 mg total) by mouth daily as needed for Pain.      Cholecalciferol 25 MCG (1000 UT) Oral Tab Take 1 tablet (1,000 Units total) by mouth daily. Vitamin d and k combined      Polyethylene Glycol 3350 (MIRALAX OR) Take 1 Package by mouth daily as needed (constipation).                 Discharge Diet: Cardiac     Discharge Activity: As tolerated       Discharge Medications        START taking these medications        Instructions Prescription details   aspirin 81 MG Chew      Chew 1 tablet (81 mg total) by mouth daily.   Quantity: 30 tablet  Refills: 0     clonazePAM 0.5 MG Tabs  Commonly known as: KlonoPIN       Take 1 tablet (0.5 mg total) by mouth nightly.   Quantity: 30 tablet  Refills: 0     clopidogrel 75 MG Tabs  Commonly known as: Plavix      Take 1 tablet (75 mg total) by mouth daily.   Quantity: 30 tablet  Refills: 0     DULoxetine 30 MG Cpep  Commonly known as: Cymbalta      Take 1 capsule (30 mg total) by mouth at bedtime.   Quantity: 30 capsule  Refills: 0            CONTINUE taking these medications        Instructions Prescription details   acetaminophen 500 MG Tabs  Commonly known as: Tylenol Extra Strength      Take 2 tablets (1,000 mg total) by mouth daily as needed for Pain.   Refills: 0     Advil 200 MG Tabs  Generic drug: ibuprofen      Take 2 tablets (400 mg total) by mouth nightly as needed for Pain.   Refills: 0     amLODIPine 2.5 MG Tabs  Commonly known as: Norvasc      Take 1 tablet (2.5 mg total) by mouth daily.   Refills: 0     atorvastatin 20 MG Tabs  Commonly known as: Lipitor      Take 1 tablet (20 mg total) by mouth daily.   Refills: 0     Cholecalciferol 25 MCG (1000 UT) Tabs  Commonly known as: VITAMIN D      Take 1 tablet (1,000 Units total) by mouth daily. Vitamin d and k combined   Refills: 0     MIRALAX OR      Take 1 Package by mouth daily as needed (constipation).   Refills: 0     Systane Ultra 0.4-0.3 % Soln  Generic drug: polypropylene glycol-      Apply 2 drops to eye as needed.   Refills: 0     Vitamin K2 100 MCG Tabs      Take 1 tablet by mouth daily.   Refills: 0               Where to Get Your Medications        These medications were sent to [id number] - [place], [place] - [address] AT [facility] [address], [phone], [phone]  [address] [facility] [address]      Phone: [phone]   aspirin 81 MG Chew  clopidogrel 75 MG Tabs  DULoxetine 30 MG Cpep       Please  your prescriptions at the location directed by your doctor or nurse    Bring a paper prescription for each of these medications  clonazePAM 0.5 MG Tabs         Follow up:       Follow-up Information       Marcelle Brown MD. Go on 11/4/2024.    Specialties: Family Practice, Geriatrics  Why: @1:30pm for your Hospital follow up appointment  Contact information:  [address] [place] [id number] [phone]               Marcelle Brown MD Follow up in 1 week(s).    Specialties: Family Practice, Geriatrics  Contact information:  [address] [place] [id number] [phone]                             Follow up Labs and imaging:         Time spent:  > 30 minutes    JOAQUIN ESPINOSA MD  10/25/2024

## 2024-11-19 ENCOUNTER — OFFICE VISIT (OUTPATIENT)
Dept: NEUROLOGY | Facility: CLINIC | Age: 74
End: 2024-11-19
Payer: MEDICARE

## 2024-11-19 VITALS
BODY MASS INDEX: 22.28 KG/M2 | HEIGHT: 61 IN | SYSTOLIC BLOOD PRESSURE: 116 MMHG | RESPIRATION RATE: 16 BRPM | WEIGHT: 118 LBS | HEART RATE: 82 BPM | OXYGEN SATURATION: 97 % | DIASTOLIC BLOOD PRESSURE: 60 MMHG

## 2024-11-19 DIAGNOSIS — R29.898 TRANSIENT RIGHT LEG WEAKNESS: ICD-10-CM

## 2024-11-19 DIAGNOSIS — R29.818 TRANSIENT NEUROLOGICAL SYMPTOMS: Primary | ICD-10-CM

## 2024-11-19 PROCEDURE — 99215 OFFICE O/P EST HI 40 MIN: CPT | Performed by: OTHER

## 2024-11-19 NOTE — PATIENT INSTRUCTIONS
Refill policies:    Allow 2-3 business days for refills; controlled substances may take longer.  Contact your pharmacy at least 5 days prior to running out of medication and have them send an electronic request or submit request through the “request refill” option in your [employer] account.  Refills are not addressed on weekends; covering physicians do not authorize routine medications on weekends.  No narcotics or controlled substances are refilled after noon on Fridays or by on call physicians.  By law, narcotics must be electronically prescribed.  A 30 day supply with no refills is the maximum allowed.  If your prescription is due for a refill, you may be due for a follow up appointment.  To best provide you care, patients receiving routine medications need to be seen at least once a year.  Patients receiving narcotic/controlled substance medications need to be seen at least once every 3 months.  In the event that your preferred pharmacy does not have the requested medication in stock (e.g. Backordered), it is your responsibility to find another pharmacy that has the requested medication available.  We will gladly send a new prescription to that pharmacy at your request.    Scheduling Tests:    If your physician has ordered radiology tests such as MRI or CT scans, please contact Central Scheduling at [phone] right away to schedule the test.  Once scheduled, the [facility] Centralized Referral Team will work with your insurance carrier to obtain pre-certification or prior authorization.  Depending on your insurance carrier, approval may take 3-10 days.  It is highly recommended patients assure they have received an authorization before having a test performed.  If test is done without insurance authorization, patient may be responsible for the entire amount billed.      Precertification and Prior Authorizations:  If your physician has recommended that you have a procedure or additional testing performed the [facility]  Centralized Referral Team will contact your insurance carrier to obtain pre-certification or prior authorization.    You are strongly encouraged to contact your insurance carrier to verify that your procedure/test has been approved and is a COVERED benefit.  Although the [facility] Centralized Referral Team does its due diligence, the insurance carrier gives the disclaimer that \"Although the procedure is authorized, this does not guarantee payment.\"    Ultimately the patient is responsible for payment.   Thank you for your understanding in this matter.  Paperwork Completion:  If you require FMLA or disability paperwork for your recovery, please make sure to either drop it off or have it faxed to our office at [phone]. Be sure the form has your name and date of birth on it.  The form will be faxed to our Forms Department and they will complete it for you.  There is a 25$ fee for all forms that need to be filled out.  Please be aware there is a 10-14 day turnaround time.  You will need to sign a release of information (STEPHEN) form if your paperwork does not come with one.  You may call the Forms Department with any questions at [phone].  Their fax number is [phone].

## 2024-11-19 NOTE — PROGRESS NOTES
HPI:    Patient ID: Zaida Osuna is a 74 year old female.    HPI    Zaida Osuna is a 70-year-old 4-year-old female who presented for hospital follow-up for an transient episode of right leg weakness.  Patient has a history for hypertension, hyperlipidemia lumbar degenerative disease  She reports on 10/22/2024 she presented to [place] ED with right leg weakness.  She states that it felt like as the her right foot is in a gel and knee will give out. States it was not exactly a weakness or numbness. No  arm or face numbness or weakness. No  associated leg pain but has  chronic back pain.  Had CT head and CTA head and neck performed which was negative for any acute abnormality or significant stenosis.  MRI brain was negative for any acute infarct.  She was discharged home on dual antiplatelet.  States there is no recurrent episode of any leg weakness.  Reports she just had left knee replacement done 2 months ago and was going through a lot of stress at home when the episode happened       HISTORY:  Past Medical History:    Anxiety state    Back problem    neck pain- pressure and tightness    Carpal tunnel syndrome    Essential hypertension    High blood pressure    High cholesterol    Hx of motion sickness    IBS (irritable bowel syndrome)    PONV (postoperative nausea and vomiting)    lasts for hours    TIA (transient ischemic attack)      Past Surgical History:   Procedure Laterality Date    Cataract Left 01/21/2018    Cataract Right 07/31/2018    Hernia surgery  07/11/2016    Hysterectomy  09/08/2020    Total abdom hysterectomy      Total knee replacement Left       Family History   Problem Relation Age of Onset    Breast Cancer Sister         61-69    Breast Cancer Sister 40    Other (Parkinson's disease) Father     Seizure Disorder Daughter     Ovarian Cancer Neg       Social History     Socioeconomic History    Marital status: Single   Tobacco Use    Smoking status: Never     Passive exposure: Yes    Smokeless  tobacco: Never   Vaping Use    Vaping status: Never Used   Substance and Sexual Activity    Alcohol use: Never    Drug use: Never   Other Topics Concern    Caffeine Concern Yes     Comment: Diet coke    Exercise Yes     Social Drivers of Health     Food Insecurity: No Food Insecurity (10/23/2024)    Food Insecurity     Food Insecurity: Never true   Transportation Needs: No Transportation Needs (10/23/2024)    Transportation Needs     Lack of Transportation: No   Recent Concern: Transportation Needs - Unmet Transportation Needs (8/23/2024)    Transportation Needs     Lack of Transportation: Yes   Housing Stability: Low Risk  (10/23/2024)    Housing Stability     Housing Instability: No        Review of Systems   Constitutional: Negative.    HENT: Negative.     Eyes: Negative.    Respiratory: Negative.     Cardiovascular: Negative.    Gastrointestinal: Negative.    Endocrine: Negative.    Genitourinary: Negative.    Musculoskeletal:  Positive for back pain.   Allergic/Immunologic: Negative.    Neurological: Negative.  Negative for weakness and numbness.   Hematological: Negative.    Psychiatric/Behavioral: Negative.     All other systems reviewed and are negative.           Current Outpatient Medications   Medication Sig Dispense Refill    clonazePAM 0.5 MG Oral Tab Take 1 tablet (0.5 mg total) by mouth nightly. 30 tablet 0    aspirin 81 MG Oral Chew Tab Chew 1 tablet (81 mg total) by mouth daily. 30 tablet 0    clopidogrel 75 MG Oral Tab Take 1 tablet (75 mg total) by mouth daily. 30 tablet 0    DULoxetine 30 MG Oral Cap DR Particles Take 1 capsule (30 mg total) by mouth at bedtime. 30 capsule 0    atorvastatin 20 MG Oral Tab Take 1 tablet (20 mg total) by mouth daily.      amLODIPine 2.5 MG Oral Tab Take 1 tablet (2.5 mg total) by mouth daily.      Polyethylene Glycol 3350 (MIRALAX OR) Take 1 Package by mouth daily as needed (constipation).      Cholecalciferol 25 MCG (1000 UT) Oral Tab Take 1 tablet (1,000 Units  total) by mouth daily. Vitamin d and k combined      Menatetrenone (VITAMIN K2) 100 MCG Oral Tab Take 1 tablet by mouth daily. (Patient not taking: Reported on 11/19/2024)      ibuprofen (ADVIL) 200 MG Oral Tab Take 2 tablets (400 mg total) by mouth nightly as needed for Pain. (Patient not taking: Reported on 11/19/2024)      polypropylene glycol- (SYSTANE ULTRA) 0.4-0.3 % Ophthalmic Solution Apply 2 drops to eye as needed. (Patient not taking: Reported on 11/19/2024)      acetaminophen 500 MG Oral Tab Take 2 tablets (1,000 mg total) by mouth daily as needed for Pain. (Patient not taking: Reported on 11/19/2024)       Allergies:Allergies[1]  PHYSICAL EXAM:   Physical Exam    General Appearance: Well nourished, well developed, no apparent distress.   HEENT: Normocephalic and atraumatic. Normal sclera. Moist mucus membrane  Neck: Normal range of motion. Neck supple.  Cardiovascular: Normal rate, regular rhythm and normal heart sounds.    Pulmonary/Chest: Effort normal and breath sounds normal.   Abdominal: Soft. Bowel sounds are normal.   Psych: Normal mood and affect    Neurological  Patient is awake, alert and oriented to person, place and time with normal memory, fund of knowledge, attention/concentration and language.    Cranial Nerves:   II: Visual fields: normal  III: Pupils: equal, round, reactive to light  III,IV,VI: Extra Ocular Movements: intact  V: Facial sensation: intact  VII: Facial strength: intact  VIII: Hearing: intact  IX: Palate: intact  XI: Shoulder shrug: intact  XII: Tongue movement: normal    Motor Exam: Normal tone. Strength is  5 out of 5 in all extremities bilaterally.  DTR:  2 + right patellar and 1+ left patellar and 1+ achilles    Sensory: Sensory examination is normal to light touch and pinprick     Coordination: Finger-to-nose normal bilaterally without evidence of dysmetria.    Gait: normal casual gait     TESTS/IMAGING:     MRI brain: 10/24/2024    Impression   CONCLUSION: No  acute intracranial process.  No evidence acute or subacute infarct. There are mild microvascular white matter ischemic changes, likely related to long-standing hypertension and/or diabetes.             CTA head and neck      CONCLUSION:     Mild chronic microvascular ischemic disease without acute intracranial abnormality.     Beaded appearance of the bilateral internal carotid arteries can be seen with fibromuscular dysplasia.  No acute dissection.     Patent neck and intracranial vasculature.       ASSESSMENT/PLAN:       ICD-10-CM    1. Transient neurological symptoms  R29.818       2. Transient right leg weakness  R29.898 MRI SPINE LUMBAR (CPT=72148) [[id number]]        Patient is a 74-year-old female presented with a change in episode of right lower leg heaviness/weakness feeling not typical of TIA  Possible peripheral etiology or nonspecific    MRI brain obtained was negative for any acute infarction.  CTA head and neck reviewed there is no significant stenosis.  There is very subtle irregularity at the distal carotid arteries but not enough to classify fibromuscular dysplasia     Advised to obtain an MRI of the lumbar spine to rule out nerve root compression or spinal stenosis  Ok to continue Aspirin 81 mg daily and other home medications    Thank you for allowing us to participate in your patient's care.    Suresh Cox MD  [facility]        Meds This Visit:  Requested Prescriptions      No prescriptions requested or ordered in this encounter       Imaging & Referrals:  None     ID#[id number]         [1]   Allergies  Allergen Reactions    Antispasmodic NAUSEA AND VOMITING     pt unsure of reaction    Chlorthalidone DIZZINESS    Belladonna Alk-Phenobarbital UNKNOWN     pt unsure of reaction    Latex RASH

## 2024-11-21 NOTE — TELEPHONE ENCOUNTER
Lab Results   Component Value Date    CHOLESTEROL 171 08/02/2023    CHOLESTEROL 138 09/16/2022    CHOLESTEROL 157 10/11/2017     Lab Results   Component Value Date    HDL 47 08/02/2023    HDL 43 09/16/2022    HDL 40 10/11/2017     Lab Results   Component Value Date    TRIG 208 (H) 08/02/2023    TRIG 196 (H) 09/16/2022    TRIG 146 10/11/2017     Lab Results   Component Value Date    NONHDLC 124 08/02/2023    NONHDLC 95 09/16/2022      Lab Results   Component Value Date    LDLCALC 82 08/02/2023      Due for recheck, ordered at previous visit. Continue atorvastatin 20 mg once daily.         Noted thank you

## 2024-11-25 ENCOUNTER — TELEPHONE (OUTPATIENT)
Dept: NEUROLOGY | Facility: CLINIC | Age: 74
End: 2024-11-25

## 2024-11-25 NOTE — TELEPHONE ENCOUNTER
Phone call returned to pt. Pt states that she would like to know if she should continue with her Aspirin 81 MG. RN reviewed providers LOV notes, and advised pt it is ok to stay on her Aspirin 81 mg. Pt verbalized understanding.

## 2024-11-27 ENCOUNTER — HOSPITAL ENCOUNTER (OUTPATIENT)
Dept: GENERAL RADIOLOGY | Facility: HOSPITAL | Age: 74
Discharge: HOME | End: 2024-11-27
Attending: ORTHOPAEDIC SURGERY
Payer: MEDICARE

## 2024-11-27 ENCOUNTER — OFFICE VISIT (OUTPATIENT)
Dept: ORTHOPEDICS CLINIC | Facility: CLINIC | Age: 74
End: 2024-11-27

## 2024-11-27 VITALS — WEIGHT: 117 LBS | BODY MASS INDEX: 22 KG/M2

## 2024-11-27 DIAGNOSIS — Z47.89 ORTHOPEDIC AFTERCARE: ICD-10-CM

## 2024-11-27 DIAGNOSIS — M17.12 PRIMARY OSTEOARTHRITIS OF LEFT KNEE: Primary | ICD-10-CM

## 2024-11-27 PROCEDURE — 73562 X-RAY EXAM OF KNEE 3: CPT | Performed by: ORTHOPAEDIC SURGERY

## 2024-11-27 PROCEDURE — 99213 OFFICE O/P EST LOW 20 MIN: CPT | Performed by: ORTHOPAEDIC SURGERY

## 2024-11-27 NOTE — PROGRESS NOTES
NURSING INTAKE COMMENTS:   Chief Complaint   Patient presents with    Post-Op     L TKA 8/23/24- pt denies any pain today       HPI: This 74 year old female presents today with complaints of left knee surgery follow-up.  She is now 3 months postoperative from a total knee arthroplasty.  She is very pleased with her status.  She was having some foot symptoms including discomfort in the plantar foot which has resolved.  She continues to have intermittent swelling in the left ankle.  She has no mechanical clicking or locking in the knee.  She is very pleased with her status.  Her preoperative symptoms have significantly improved.    Past Medical History:    Anxiety state    Back problem    neck pain- pressure and tightness    Carpal tunnel syndrome    Essential hypertension    High blood pressure    High cholesterol    Hx of motion sickness    IBS (irritable bowel syndrome)    PONV (postoperative nausea and vomiting)    lasts for hours    TIA (transient ischemic attack)     Past Surgical History:   Procedure Laterality Date    Cataract Left 01/21/2018    Cataract Right 07/31/2018    Hernia surgery  07/11/2016    Hysterectomy  09/08/2020    Total abdom hysterectomy      Total knee replacement Left      Current Outpatient Medications   Medication Sig Dispense Refill    clonazePAM 0.5 MG Oral Tab Take 1 tablet (0.5 mg total) by mouth nightly. 30 tablet 0    aspirin 81 MG Oral Chew Tab Chew 1 tablet (81 mg total) by mouth daily. 30 tablet 0    DULoxetine 30 MG Oral Cap DR Particles Take 1 capsule (30 mg total) by mouth at bedtime. 30 capsule 0    atorvastatin 20 MG Oral Tab Take 1 tablet (20 mg total) by mouth daily.      amLODIPine 2.5 MG Oral Tab Take 1 tablet (2.5 mg total) by mouth daily.      Polyethylene Glycol 3350 (MIRALAX OR) Take 1 Package by mouth daily as needed (constipation).      Cholecalciferol 25 MCG (1000 UT) Oral Tab Take 1 tablet (1,000 Units total) by mouth daily. Vitamin d and k combined       Menatetrenone (VITAMIN K2) 100 MCG Oral Tab Take 1 tablet by mouth daily. (Patient not taking: Reported on 11/19/2024)      ibuprofen (ADVIL) 200 MG Oral Tab Take 2 tablets (400 mg total) by mouth nightly as needed for Pain. (Patient not taking: Reported on 11/19/2024)      polypropylene glycol- (SYSTANE ULTRA) 0.4-0.3 % Ophthalmic Solution Apply 2 drops to eye as needed. (Patient not taking: Reported on 11/19/2024)      acetaminophen 500 MG Oral Tab Take 2 tablets (1,000 mg total) by mouth daily as needed for Pain. (Patient not taking: Reported on 11/19/2024)       Allergies[1]  Family History   Problem Relation Age of Onset    Breast Cancer Sister         61-69    Breast Cancer Sister 40    Other (Parkinson's disease) Father     Seizure Disorder Daughter     Ovarian Cancer Neg        Social History     Occupational History    Not on file   Tobacco Use    Smoking status: Never     Passive exposure: Yes    Smokeless tobacco: Never   Vaping Use    Vaping status: Never Used   Substance and Sexual Activity    Alcohol use: Never    Drug use: Never    Sexual activity: Not on file        Review of Systems:  GENERAL: denies fevers, chills, night sweats, fatigue, unintentional weight loss/gain  SKIN: denies skin lesions, open sores, rash  HEENT:denies recent vision change, new nasal congestion,hearing loss, tinnitus, sore throat, headaches  RESPIRATORY: denies new shortness of breath, cough, asthma, wheezing  CARDIOVASCULAR: denies chest pain, leg cramps with exertion, palpitations, leg swelling  GI: denies abdominal pain, nausea, vomiting, diarrhea, constipation, hematochezia, worsening heartburn or stomach ulcers  : denies dysuria, hematuria, incontinence, increased frequency, urgency, difficulty urinating  MUSCULOSKELETAL: denies musculoskeletal complaints other than in HPI  NEURO: denies numbness, tingling, weakness, balance issues, dizziness, memory loss  PSYCHIATRIC: denies Hx of depression, anxiety, other  psychiatric disorders  HEMATOLOGIC: denies blood clots, anemia, blood clotting disorders, blood transfusion  ENDOCRINE: denies autoimmune disease, thyroid issues, or diabetes  ALLERGY: denies asthma, seasonal allergies    Physical Examination:    Wt 117 lb (53.1 kg)   BMI 22.11 kg/m²   Constitutional: appears well hydrated, alert and responsive, no acute distress noted  Extremities: Left knee deformity corrected to neutral.  Incision well-healed.  No erythema or palpable warmth.  No significant effusion.  Mild atrophy left thigh.  Range of motion is from 0 to 120 degrees.  Good varus valgus stability in mid flexion.  Neurological: Unchanged    Imaging:   Left knee x-rays show well-positioned well-fixed total knee arthroplasty.  No periprosthetic lucency or fracture.    Labs:  Lab Results   Component Value Date    WBC 5.4 10/25/2024    HGB 10.9 (L) 10/25/2024    .0 10/25/2024      Lab Results   Component Value Date    GLU 97 10/25/2024    BUN 18 10/25/2024    CREATSERUM 0.74 10/25/2024    GFRNAA 78 01/14/2022    GFRAA 90 01/14/2022        Assessment and Plan:  Diagnoses and all orders for this visit:    Primary osteoarthritis of left knee    Orthopedic aftercare  -     XR KNEE (3 VIEWS), LEFT (CPT=73562); Future        Assessment: Well-functioning left total knee arthroplasty    Plan: I advised continued range of motion strengthening exercises.  Expect gradual improvement in strength and mobility.  Follow-up with me in 9 months with routine x-rays.  Again discussed dental prophylaxis.    Follow Up: Return in about 9 months (around 8/27/2025).    YANIV GUERRERO MD       [1]   Allergies  Allergen Reactions    Antispasmodic NAUSEA AND VOMITING     pt unsure of reaction    Chlorthalidone DIZZINESS    Belladonna Alk-Phenobarbital UNKNOWN     pt unsure of reaction    Latex RASH

## 2024-12-14 ENCOUNTER — APPOINTMENT (OUTPATIENT)
Dept: CT IMAGING | Facility: HOSPITAL | Age: 74
End: 2024-12-14
Attending: EMERGENCY MEDICINE
Payer: MEDICARE

## 2024-12-14 ENCOUNTER — HOSPITAL ENCOUNTER (EMERGENCY)
Facility: HOSPITAL | Age: 74
Discharge: HOME | End: 2024-12-14
Attending: EMERGENCY MEDICINE
Payer: MEDICARE

## 2024-12-14 VITALS
HEART RATE: 57 BPM | RESPIRATION RATE: 18 BRPM | WEIGHT: 118 LBS | BODY MASS INDEX: 22 KG/M2 | OXYGEN SATURATION: 97 % | DIASTOLIC BLOOD PRESSURE: 59 MMHG | SYSTOLIC BLOOD PRESSURE: 130 MMHG | TEMPERATURE: 97 F

## 2024-12-14 DIAGNOSIS — S00.81XA ABRASION OF FACE, INITIAL ENCOUNTER: Primary | ICD-10-CM

## 2024-12-14 PROCEDURE — 70450 CT HEAD/BRAIN W/O DYE: CPT | Performed by: EMERGENCY MEDICINE

## 2024-12-14 PROCEDURE — 99284 EMERGENCY DEPT VISIT MOD MDM: CPT

## 2024-12-14 PROCEDURE — 70486 CT MAXILLOFACIAL W/O DYE: CPT | Performed by: EMERGENCY MEDICINE

## 2024-12-14 NOTE — ED PROVIDER NOTES
Patient Seen in: [facility] Emergency Department    History     Chief Complaint   Patient presents with    Eye Visual Problem       HPI    74 year old female here after she reached for a plastic bottle on the top shelf at the grocery store and the bottle fell on her R maxilla resulting in a cut, she states. Denies vision changes, diplopia, focal weakness numbness or paresthesias.      History reviewed.   Past Medical History:    Anxiety state    Back problem    neck pain- pressure and tightness    Carpal tunnel syndrome    Essential hypertension    High blood pressure    High cholesterol    Hx of motion sickness    IBS (irritable bowel syndrome)    PONV (postoperative nausea and vomiting)    lasts for hours    TIA (transient ischemic attack)       History reviewed.   Past Surgical History:   Procedure Laterality Date    Cataract Left 01/21/2018    Cataract Right 07/31/2018    Hernia surgery  07/11/2016    Hysterectomy  09/08/2020    Total abdom hysterectomy      Total knee replacement Left          Medications :  Prescriptions Prior to Admission[1]     Family History   Problem Relation Age of Onset    Breast Cancer Sister         61-69    Breast Cancer Sister 40    Other (Parkinson's disease) Father     Seizure Disorder Daughter     Ovarian Cancer Neg        Smoking Status:   Social History     Socioeconomic History    Marital status: Single   Tobacco Use    Smoking status: Never     Passive exposure: Yes    Smokeless tobacco: Never   Vaping Use    Vaping status: Never Used   Substance and Sexual Activity    Alcohol use: Never    Drug use: Never   Other Topics Concern    Caffeine Concern Yes     Comment: Diet coke    Exercise Yes       Constitutional and vital signs reviewed.      Social History and Family History elements reviewed from today, pertinent positives to the presenting problem noted.    Physical Exam     ED Triage Vitals   BP 12/14/24 1027 137/74   Pulse 12/14/24 1027 78   Resp 12/14/24 1030 16    Temp 12/14/24 1027 97.4 °F (36.3 °C)   Temp src 12/14/24 1027 Oral   SpO2 12/14/24 1027 98 %   O2 Device 12/14/24 1230 None (Room air)       All measures to prevent infection transmission during my interaction with the patient were taken. The patient was already wearing a droplet mask on my arrival to the room. Personal protective equipment was worn throughout the duration of the exam.  Handwashing was performed prior to and after the exam.  Stethoscope and any equipment used during my examination was cleaned with super sani-cloth germicidal wipes following the exam.     Physical Exam    General: NAD  Head: Normocephalic . R maxilla with abrasion.   Mouth/Throat/Ears/Nose: Oropharynx is clear and moist.   Eyes: Conjunctivae and EOM are normal. VA grossly intact bilaterally   Neck: Normal range of motion. Supple.   Cardiovascular: Normal rate, regular rhythm, normal heart sounds.  Respiratory/Chest: Clear and equal bilaterally. Exhibits no tenderness.  Gastrointestinal: Soft, non-tender, non-distended. Bowel sounds are normal.   Musculoskeletal:No swelling or deformity.   Neurological: Alert and appropriate. No focal deficits. AOx4  CN II-XII grossly intact  5/5 strength: Left  strength, deltoid abduction, achilles, patella  5/5 strength: Right  strength, deltoid abduction, achilles, patella   SILT to bilateral upper and lower extremities   normal gait      Skin: Skin is warm and dry. No pallor.  Psychiatric: Has a normal mood and affect.      ED Course      Labs Reviewed - No data to display    As Interpreted by me    Imaging Results Available and Reviewed while in ED: CT BRAIN OR HEAD (CPT=70450)    Result Date: 12/14/2024  CONCLUSION:   1. No acute intracranial abnormality.  No calvarial fracture.  2. No acute facial fracture.  No radiopaque foreign body.  Small right maxillary soft tissue abrasion/contusion.    Dictated by (CST): Endy Henderson MD on 12/14/2024 at 12:05 PM     Finalized by (CST): Santiago  Endy GA MD on 12/14/2024 at 12:09 PM          CT FACIAL BONES (CPT=70486)    Result Date: 12/14/2024  CONCLUSION:   1. No acute intracranial abnormality.  No calvarial fracture.  2. No acute facial fracture.  No radiopaque foreign body.  Small right maxillary soft tissue abrasion/contusion.    Dictated by (CST): Endy Henderson MD on 12/14/2024 at 12:05 PM     Finalized by (CST): Endy Henderson MD on 12/14/2024 at 12:09 PM         ED Medications Administered: Medications - No data to display      MDM     Vitals:    12/14/24 1027 12/14/24 1030 12/14/24 1230   BP: 137/74  130/59   Pulse: 78  57   Resp:  16 18   Temp: 97.4 °F (36.3 °C)     TempSrc: Oral     SpO2: 98%  97%   Weight: 53.5 kg       *I personally reviewed and interpreted all ED vitals.    Pulse Ox: 98%, Room air, Normal        Medical Decision Making      Differential Diagnosis/ Diagnostic Considerations: right facial abrasion, orbital wall fracture     Complicating Factors: The patient already has HTN admission to contribute to the complexity of this ED evaluation.    I reviewed prior chart records including dc summary from 10/22/24.  Patient here with right facial abrasion, no laceration, it was irrigated right after injury yesterday.  Discussed topical over-the-counter antibiotic.  CT head unremarkable for intracranial hemorrhage on my interpretation.      Dc In stable condition.  Patient is comfortable with the plan.       Prescriptions:topical antibiotics as above       Disposition and Plan     Clinical Impression:  1. Abrasion of face, initial encounter        Disposition:  Discharge    Follow-up:  Marcelle Brown MD  [address] [place] [id number]  [phone]    Schedule an appointment as soon as possible for a visit in 1 day(s)        Medications Prescribed:  Discharge Medication List as of 12/14/2024 12:33 PM                           [1] (Not in a hospital admission)

## 2024-12-30 ENCOUNTER — HOSPITAL ENCOUNTER (OUTPATIENT)
Dept: MRI IMAGING | Facility: HOSPITAL | Age: 74
Discharge: HOME | End: 2024-12-30
Attending: Other
Payer: MEDICARE

## 2024-12-30 DIAGNOSIS — R29.898 TRANSIENT RIGHT LEG WEAKNESS: ICD-10-CM

## 2024-12-30 PROCEDURE — 72148 MRI LUMBAR SPINE W/O DYE: CPT | Performed by: OTHER

## 2025-01-07 ENCOUNTER — TELEPHONE (OUTPATIENT)
Dept: NEUROLOGY | Facility: CLINIC | Age: 75
End: 2025-01-07

## 2025-01-10 NOTE — TELEPHONE ENCOUNTER
Patient was called, message left     Ortho spine consult placed. Please let the patient know    Thanks

## 2025-01-15 ENCOUNTER — TELEPHONE (OUTPATIENT)
Dept: ORTHOPEDICS CLINIC | Facility: CLINIC | Age: 75
End: 2025-01-15

## 2025-01-15 DIAGNOSIS — G56.02 LEFT CARPAL TUNNEL SYNDROME: Primary | ICD-10-CM

## 2025-01-15 NOTE — TELEPHONE ENCOUNTER
Pt called.  Scheduled for surgery in march 2025.  Cardiologist wants pt to take rx. Plavix.  Pt has not started due to upcoming surgery.  Can pt reschedule surgery sooner.  Please call pt

## 2025-01-15 NOTE — TELEPHONE ENCOUNTER
Phone call returned to pt. Advised pt on Dr. Cox's review and recommendation regarding her recent MRI. Patient was provided referral information and will stop at this office to pick this up. Referral in envelope at PSR desk.

## 2025-02-07 ENCOUNTER — TELEPHONE (OUTPATIENT)
Dept: ORTHOPEDICS CLINIC | Facility: CLINIC | Age: 75
End: 2025-02-07

## 2025-02-07 DIAGNOSIS — G56.02 LEFT CARPAL TUNNEL SYNDROME: Primary | ICD-10-CM

## 2025-02-10 NOTE — TELEPHONE ENCOUNTER
Left message to inform patient that I will cancel her surgery for 2/12 and for her to call back if she would like to reschedule.

## 2025-04-01 ENCOUNTER — OFFICE VISIT (OUTPATIENT)
Dept: NEUROLOGY | Facility: CLINIC | Age: 75
End: 2025-04-01
Payer: MEDICARE

## 2025-04-01 VITALS
BODY MASS INDEX: 22.66 KG/M2 | DIASTOLIC BLOOD PRESSURE: 70 MMHG | RESPIRATION RATE: 16 BRPM | HEART RATE: 64 BPM | OXYGEN SATURATION: 97 % | WEIGHT: 120 LBS | HEIGHT: 61 IN | SYSTOLIC BLOOD PRESSURE: 102 MMHG

## 2025-04-01 DIAGNOSIS — M48.061 SPINAL STENOSIS OF LUMBAR REGION WITHOUT NEUROGENIC CLAUDICATION: ICD-10-CM

## 2025-04-01 DIAGNOSIS — R29.898 TRANSIENT RIGHT LEG WEAKNESS: ICD-10-CM

## 2025-04-01 PROCEDURE — 99214 OFFICE O/P EST MOD 30 MIN: CPT | Performed by: OTHER

## 2025-04-01 RX ORDER — CLOPIDOGREL BISULFATE 75 MG/1
75 TABLET ORAL DAILY
COMMUNITY
Start: 2024-12-18

## 2025-04-01 NOTE — PROGRESS NOTES
HPI:    Patient ID: Zaida Osuna is a 74 year old female.    Neurologic Problem  The patient's pertinent negatives include no weakness. Associated symptoms include back pain.       Patient is a 75 yo female who presents for follow up for right leg weakness to discuss the MRI lumbar spine results. States had no further episode of RLE weakness/numbness. MRI lumbar spine shows levoscoliosis and multilevel degenerative changes causing moderate spinal stenosis at L3-4 level        Zaida Osuna is a 74-year-old female who presented for hospital follow-up for an transient episode of right leg weakness.  Patient has a history for hypertension, hyperlipidemia lumbar degenerative disease  She reports on 10/22/2024 she presented to [facility] ED with right leg weakness.  She states that it felt like as the her right foot is in a gel and knee will give out. States it was not exactly a weakness or numbness. No  arm or face numbness or weakness. No  associated leg pain but has  chronic back pain.  Had CT head and CTA head and neck performed which was negative for any acute abnormality or significant stenosis.  MRI brain was negative for any acute infarct.  She was discharged home on dual antiplatelet.  States there is no recurrent episode of any leg weakness.  Reports she just had left knee replacement done 2 months ago and was going through a lot of stress at home when the episode happened       HISTORY:  Past Medical History:    Anxiety state    Back problem    neck pain- pressure and tightness    Carpal tunnel syndrome    Essential hypertension    High blood pressure    High cholesterol    Hx of motion sickness    IBS (irritable bowel syndrome)    PONV (postoperative nausea and vomiting)    lasts for hours    TIA (transient ischemic attack)      Past Surgical History:   Procedure Laterality Date    Cataract Left 01/21/2018    Cataract Right 07/31/2018    Hernia surgery  07/11/2016    Hysterectomy  09/08/2020    Removal gallbladder   03/04/2025    Total abdom hysterectomy      Total knee replacement Left       Family History   Problem Relation Age of Onset    Breast Cancer Sister         61-69    Breast Cancer Sister 40    Other (Parkinson's disease) Father     Seizure Disorder Daughter     Ovarian Cancer Neg       Social History     Socioeconomic History    Marital status: Single   Tobacco Use    Smoking status: Never     Passive exposure: Yes    Smokeless tobacco: Never   Vaping Use    Vaping status: Never Used   Substance and Sexual Activity    Alcohol use: Never    Drug use: Never   Other Topics Concern    Caffeine Concern Yes     Comment: Diet coke    Exercise Yes     Social Drivers of Health     Food Insecurity: No Food Insecurity (10/23/2024)    Food Insecurity     Food Insecurity: Never true   Transportation Needs: No Transportation Needs (10/23/2024)    Transportation Needs     Lack of Transportation: No   Recent Concern: Transportation Needs - Unmet Transportation Needs (8/23/2024)    Transportation Needs     Lack of Transportation: Yes   Housing Stability: Low Risk  (10/23/2024)    Housing Stability     Housing Instability: No        Review of Systems   Constitutional: Negative.    HENT: Negative.     Eyes: Negative.    Respiratory: Negative.     Cardiovascular: Negative.    Gastrointestinal: Negative.    Endocrine: Negative.    Genitourinary: Negative.    Musculoskeletal:  Positive for back pain.   Allergic/Immunologic: Negative.    Neurological: Negative.  Negative for weakness and numbness.   Hematological: Negative.    Psychiatric/Behavioral: Negative.     All other systems reviewed and are negative.           Current Outpatient Medications   Medication Sig Dispense Refill    clopidogrel 75 MG Oral Tab Take 1 tablet (75 mg total) by mouth daily.      aspirin 81 MG Oral Chew Tab Chew 1 tablet (81 mg total) by mouth daily. 30 tablet 0    polypropylene glycol- (SYSTANE ULTRA) 0.4-0.3 % Ophthalmic Solution Apply 2 drops to eye  as needed.      atorvastatin 20 MG Oral Tab Take 1 tablet (20 mg total) by mouth daily.      amLODIPine 2.5 MG Oral Tab Take 1 tablet (2.5 mg total) by mouth daily.      acetaminophen 500 MG Oral Tab Take 2 tablets (1,000 mg total) by mouth daily as needed for Pain.      Cholecalciferol 25 MCG (1000 UT) Oral Tab Take 1 tablet (1,000 Units total) by mouth daily. Vitamin d and k combined      clonazePAM 0.5 MG Oral Tab Take 1 tablet (0.5 mg total) by mouth nightly. 30 tablet 0    DULoxetine 30 MG Oral Cap DR Particles Take 1 capsule (30 mg total) by mouth at bedtime. (Patient not taking: Reported on 4/1/2025) 30 capsule 0    Menatetrenone (VITAMIN K2) 100 MCG Oral Tab Take 1 tablet by mouth daily. (Patient not taking: Reported on 4/1/2025)      ibuprofen (ADVIL) 200 MG Oral Tab Take 2 tablets (400 mg total) by mouth nightly as needed for Pain. (Patient not taking: Reported on 4/1/2025)      Polyethylene Glycol 3350 (MIRALAX OR) Take 1 Package by mouth daily as needed (constipation). (Patient not taking: Reported on 4/1/2025)       Allergies:Allergies[1]  PHYSICAL EXAM:   Physical Exam    General Appearance: Well nourished, well developed, no apparent distress.   HEENT: Normocephalic and atraumatic. Normal sclera. Moist mucus membrane  Neck: Normal range of motion. Neck supple.  Cardiovascular: Normal rate, regular rhythm and normal heart sounds.    Pulmonary/Chest: Effort normal and breath sounds normal.   Abdominal: Soft. Bowel sounds are normal.   Psych: Normal mood and affect    Neurological  Patient is awake, alert and oriented to person, place and time with normal memory, fund of knowledge, attention/concentration and language.    Cranial Nerves:   II: Visual fields: normal  III: Pupils: equal, round, reactive to light  III,IV,VI: Extra Ocular Movements: intact  V: Facial sensation: intact  VII: Facial strength: intact  VIII: Hearing: intact  IX: Palate: intact  XI: Shoulder shrug: intact  XII: Tongue movement:  normal    Motor Exam: Normal tone. Strength is  5 out of 5 in all extremities bilaterally.  DTR:  2 + right patellar and 1+ left patellar and 1+ achilles    Sensory: Sensory examination is normal to light touch and pinprick     Coordination: Finger-to-nose normal bilaterally without evidence of dysmetria.    Gait: mildly unsteady, reports leg length discrepancy    TESTS/IMAGING:         Impression   CONCLUSION:     1. Moderate levoconvex scoliosis of the lumbar spine with Smith angle measuring 43 degrees from the superior endplate of L1 to the inferior endplate of L4.  There is associated leftward lateral listhesis of L4 on L5 measuring 6 mm and of L3 on L4  measuring 10 mm.     2. Multifactorial degenerative changes and levoconvex scoliosis result in severe foraminal stenosis bilaterally at L1-2, on the right at L2-3, and on the left at L5-S1.  There is also resultant moderate foraminal stenosis on the left at L2-3, on the  right at L3-4, and on the right at L4-5.  Additional levels of mild foraminal stenosis are described in the body of this report.     3. Multifactorial degenerative changes in levoconvex scoliosis also result in moderate spinal canal stenosis with bunching of the cauda equina at L3-4 and mild spinal canal stenosis with narrowing of the right lateral recess at L1-2.  Additional levels  of mild spinal canal stenosis are described in the body of this report.                         MRI brain: 10/24/2024    Impression   CONCLUSION: No acute intracranial process.  No evidence acute or subacute infarct. There are mild microvascular white matter ischemic changes, likely related to long-standing hypertension and/or diabetes.             CTA head and neck      CONCLUSION:     Mild chronic microvascular ischemic disease without acute intracranial abnormality.     Beaded appearance of the bilateral internal carotid arteries can be seen with fibromuscular dysplasia.  No acute dissection.     Patent neck and  intracranial vasculature.       ASSESSMENT/PLAN:       ICD-10-CM    1. Spinal stenosis of lumbar region without neurogenic claudication  M48.061       2. Transient right leg weakness  R29.898             Patient is a 74-year-old female presented with a change in episode of right lower leg heaviness/weakness feeling not typical of TIA    MRI brain obtained was negative for any acute infarction.  CTA head and neck reviewed there is no significant stenosis.  There is very subtle irregularity at the distal carotid arteries but not enough to classify fibromuscular dysplasia     MRI lumbar spine shows levoscoliosis and multilevel DJD with moderate spinal stenosis at L3-4 level and severe foraminal stenosis right l2-3 and left L5-S1  RLE symptoms likely related to lumbar spinal stenosis  Advise to follow with spine surgery    Follow up with me on as needed basis    Thank you for allowing us to participate in your patient's care.    Suresh Cox MD  [facility]        Meds This Visit:  Requested Prescriptions      No prescriptions requested or ordered in this encounter       Imaging & Referrals:  None     ID#[id number]         [1]   Allergies  Allergen Reactions    Antispasmodic NAUSEA AND VOMITING     pt unsure of reaction    Chlorthalidone DIZZINESS    Belladonna Alk-Phenobarbital UNKNOWN     pt unsure of reaction    Latex RASH

## 2025-04-01 NOTE — PATIENT INSTRUCTIONS
Refill policies:    Allow 2-3 business days for refills; controlled substances may take longer.  Contact your pharmacy at least 5 days prior to running out of medication and have them send an electronic request or submit request through the “request refill” option in your [employer] account.  Refills are not addressed on weekends; covering physicians do not authorize routine medications on weekends.  No narcotics or controlled substances are refilled after noon on Fridays or by on call physicians.  By law, narcotics must be electronically prescribed.  A 30 day supply with no refills is the maximum allowed.  If your prescription is due for a refill, you may be due for a follow up appointment.  To best provide you care, patients receiving routine medications need to be seen at least once a year.  Patients receiving narcotic/controlled substance medications need to be seen at least once every 3 months.  In the event that your preferred pharmacy does not have the requested medication in stock (e.g. Backordered), it is your responsibility to find another pharmacy that has the requested medication available.  We will gladly send a new prescription to that pharmacy at your request.    Scheduling Tests:    If your physician has ordered radiology tests such as MRI or CT scans, please contact Central Scheduling at [phone] right away to schedule the test.  Once scheduled, the [facility] Centralized Referral Team will work with your insurance carrier to obtain pre-certification or prior authorization.  Depending on your insurance carrier, approval may take 3-10 days.  It is highly recommended patients assure they have received an authorization before having a test performed.  If test is done without insurance authorization, patient may be responsible for the entire amount billed.      Precertification and Prior Authorizations:  If your physician has recommended that you have a procedure or additional testing performed the [facility]  Centralized Referral Team will contact your insurance carrier to obtain pre-certification or prior authorization.    You are strongly encouraged to contact your insurance carrier to verify that your procedure/test has been approved and is a COVERED benefit.  Although the [facility] Centralized Referral Team does its due diligence, the insurance carrier gives the disclaimer that \"Although the procedure is authorized, this does not guarantee payment.\"    Ultimately the patient is responsible for payment.   Thank you for your understanding in this matter.  Paperwork Completion:  If you require FMLA or disability paperwork for your recovery, please make sure to either drop it off or have it faxed to our office at [phone]. Be sure the form has your name and date of birth on it.  The form will be faxed to our Forms Department and they will complete it for you.  There is a 25$ fee for all forms that need to be filled out.  Please be aware there is a 10-14 day turnaround time.  You will need to sign a release of information (STEPHEN) form if your paperwork does not come with one.  You may call the Forms Department with any questions at [phone].  Their fax number is [phone].

## 2025-06-09 ENCOUNTER — MOBILE ENCOUNTER (OUTPATIENT)
Facility: CLINIC | Age: 75
End: 2025-06-09

## 2025-06-09 NOTE — PROGRESS NOTES
I was called by a primary care office at [facility].  I called the number back but their office was closed on the evening of 6/9.  Apparently they needed clarification on some medications.  Could we call them back and find out what is going on?  The person calling was Julia, the nurse for Dr Brown, [phone].

## 2025-06-16 ENCOUNTER — TELEPHONE (OUTPATIENT)
Dept: NEUROLOGY | Facility: CLINIC | Age: 75
End: 2025-06-16

## 2025-06-16 NOTE — TELEPHONE ENCOUNTER
Received letter from  [facility] seeking need for ASA and plavix from Provider. Placed in provider folder for review,

## 2025-06-18 NOTE — DISCHARGE INSTRUCTIONS
Norco or tylenol for pain.  Ibuprofen for pain/inflammation.    May remove dressing and place bandaids over incision in 2 days.    Keep incision clean and dry.    Ice and elevate wrist.    No weightbearing on left wrist.    Follow up with Dr. Lees in 1 week [phone].      HOME INSTRUCTIONS  [facility] HOME CARE INSTRUCTIONS: POST-OP ANESTHESIA  The medication that you received for sedation or general anesthesia can last up to 24 hours. Your judgment and reflexes may be altered, even if you feel like your normal self.      We Recommend:   Do not drive any motor vehicle or bicycle   Avoid mowing the lawn, playing sports, or working with power tools/applicances (power saws, electric knives or mixers)   That you have someone stay with you on your first night home   Do not drink alcohol or take sleeping pills or tranquilizers   Do not sign legal documents within 24 hours of your procedure   If you had a nerve block for your surgery, take extra care not to put any pressure on your arm or hand for 24 hours    It is normal:  For you to have a sore throat if you had a breathing tube during surgery (while you were asleep!). The sore throat should get better within 48 hours. You can gargle with warm salt water (1/2 tsp in 4 oz warm water) or use a throat lozenge for comfort  To feel muscle aches or soreness especially in the abdomen, chest or neck. The achy feeling should go away in the next 24 hours  To feel weak, sleepy or \"wiped out\". Your should start feeling better in the next 24 hours.   To experience mild discomforts such as sore lip or tongue, headache, cramps, gas pains or a bloated feeling in your abdomen.   To experience mild back pain or soreness for a day or two if you had spinal or epidural anesthesia.   If you had laparoscopic surgery, to feel shoulder pain or discomfort on the day of surgery.   For some patients to have nausea after surgery/anesthesia    If you feel nausea or experience vomiting:   Try to  move around less.   Eat less than usual or drink only liquids until the next morning   Nausea should resolve in about 24 hours    If you have a problem when you are at home:    Call your surgeons office   Discharge Instructions: After Your Surgery  You’ve just had surgery. During surgery, you were given medicine called anesthesia to keep you relaxed and free of pain. After surgery, you may have some pain or nausea. This is common. Here are some tips for feeling better and getting well after surgery.   Going home  Your healthcare provider will show you how to take care of yourself when you go home. They'll also answer your questions. Have an adult family member or friend drive you home. For the first 24 hours after your surgery:   Don't drive or use heavy equipment.  Don't make important decisions or sign legal papers.  Take medicines as directed.  Don't drink alcohol.  Have someone stay with you, if needed. They can watch for problems and help keep you safe.  Be sure to go to all follow-up visits with your healthcare provider. And rest after your surgery for as long as your provider tells you to.   Coping with pain  If you have pain after surgery, pain medicine will help you feel better. Take it as directed, before pain becomes severe. Also, ask your healthcare provider or pharmacist about other ways to control pain. This might be with heat, ice, or relaxation. And follow any other instructions your surgeon or nurse gives you.      Stay on schedule with your medicine.     Tips for taking pain medicine  To get the best relief possible, remember these points:   Pain medicines can upset your stomach. Taking them with a little food may help.  Most pain relievers taken by mouth need at least 20 to 30 minutes to start to work.  Don't wait till your pain becomes severe before you take your medicine. Try to time your medicine so that you can take it before starting an activity. This might be before you get dressed, go for a  walk, or sit down for dinner.  Constipation is a common side effect of some pain medicines. Call your healthcare provider before taking any medicines, such as laxatives or stool softeners, to help ease constipation. Also ask if you should skip any foods. Drinking lots of fluids and eating foods, such as fruits and vegetables, that are high in fiber can also help. Remember, don't take laxatives unless your surgeon has prescribed them.  Drinking alcohol and taking pain medicine can cause dizziness and slow your breathing. It can even be deadly. Don't drink alcohol while taking pain medicine.  Pain medicine can make you react more slowly to things. Don't drive or run machinery while taking pain medicine.  Your healthcare provider may tell you to take acetaminophen to help ease your pain. Ask them how much you're supposed to take each day. Acetaminophen or other pain relievers may interact with your prescription medicines or other over-the-counter (OTC) medicines. Some prescription medicines have acetaminophen and other ingredients in them. Using both prescription and OTC acetaminophen for pain can cause you to accidentally overdose. Read the labels on your OTC medicines with care. This will help you to clearly know the list of ingredients, how much to take, and any warnings. It may also help you not take too much acetaminophen. If you have questions or don't understand the information, ask your pharmacist or healthcare provider to explain it to you before you take the OTC medicine.   Managing nausea  Some people have an upset stomach (nausea) after surgery. This is often because of anesthesia, pain, or pain medicine, less movement of food in the stomach, or the stress of surgery. These tips will help you handle nausea and eat healthy foods as you get better. If you were on a special food plan before surgery, ask your healthcare provider if you should follow it while you get better. Check with your provider on how your  eating should progress. It may depend on the surgery you had. These general tips may help:   Don't push yourself to eat. Your body will tell you when to eat and how much.  Start off with clear liquids and soup. They're easier to digest.  Next try semi-solid foods as you feel ready. These include mashed potatoes, applesauce, and gelatin.  Slowly move to solid foods. Don’t eat fatty, rich, or spicy foods at first.  Don't force yourself to have 3 large meals a day. Instead eat smaller amounts more often.  Take pain medicines with a small amount of solid food, such as crackers or toast. This helps prevent nausea.  When to call your healthcare provider  Call your healthcare provider right away if you have any of these:   You still have too much pain, or the pain gets worse, after taking the medicine. The medicine may not be strong enough. Or there may be a complication from the surgery.  You feel too sleepy, dizzy, or groggy. The medicine may be too strong.  Side effects, such as nausea or vomiting. Your healthcare provider may advise taking other medicines to treat these or may change your treatment plan..  Skin changes, such as rash, itching, or hives. This may mean you have an allergic reaction. Your provider may advise taking other medicines.  The incision looks different (for instance, part of it opens up).  Bleeding or fluid leaking from the incision site, and you weren't told to expect that.  Fever of 100.4°F (38°C) or higher, or as directed by your healthcare provider.  Call 911  Call 911 right away if you have:   Trouble breathing  Facial swelling    If you have obstructive sleep apnea   You were given anesthesia medicine during surgery to keep you comfortable and free of pain. After surgery, you may have more apnea spells because of this medicine and other medicines you were given. The spells may last longer than normal.    At home:  Keep using the continuous positive airway pressure (CPAP) device when you  sleep. Unless your healthcare provider tells you not to, use it when you sleep, day or night. CPAP is a common device used to treat obstructive sleep apnea.  Talk with your provider before taking any pain medicine, muscle relaxants, or sedatives. Your provider will tell you about the possible dangers of taking these medicines.  Contact your provider if your sleeping changes a lot even when taking medicines as directed.  [employer] last reviewed this educational content on 4/1/2024  This information is for informational purposes only. This is not intended to be a substitute for professional medical advice, diagnosis, or treatment. Always seek the advice and follow the directions from your physician or other qualified health care provider.  © 7040-4091 [employer]. All rights reserved. This information is not intended as a substitute for professional medical care. Always follow your healthcare professional's instructions.

## 2025-06-19 NOTE — H&P
[facility]  part of [facility]    History & Physical    Zaida Osuna Patient Status:  Hospital Outpatient Surgery    1950 MRN [id number]   Location [facility] OPERATING ROOM Attending Gilberto Lees MD   Hosp Day # 0 PCP KEMAR JAY MD     Date:  2025  Date of Admission:  (Not on file)    History provided by:patient  Chief Complaint:   No chief complaint on file.    Left wrist pain and numbness   HPI:   Zaida Osuna is a(n) 74 year old female. Patient presents for ongoing left hand pain and numbness. She has a long history of left hand pain and numbness involving the thumb, index, and middle finger. Back in May 2024 she had electrodiagnostic testing which was consistent with bilateral carpal tunnel syndrome. She was scheduled for surgery on her left hand in February, but had to cancel due to gallbladder problems. She reports no new injury to the hand. Her symptoms are unchanged from previous visits. She has increased pain at night. She has tried anti-inflammatory medications and night splinting without improvement.     PMHx: HTN, mixed hyperlipidemia, TIA, GERD, anxiety, depression  Allergies: Antispasmodic, chlorthalidone, belladonna alk-phenobarbital, latex  Medications: ASA 81mg, clopidogrel, clonazepam, duloxetine, atorvastatin, amlodipine    History   Past Medical History[1]  Past Surgical History[2]  Family History[3]  Social History:  Short Social Hx on File[4]  Allergies/Medications:   Allergies: Allergies[5]  Prescriptions Prior to Admission[6]    Review of Systems:   Pertinent items are noted in HPI.    Physical Exam:   Vital Signs:  not currently breastfeeding.     General appearance: alert, appears stated age and cooperative  Extremities: Left hand with mild thenar atrophy.  strength is 4/5. Thumb extension is intact and graded 5/5. Finger abduction and adduction are 4+/5. Median nerve compression test and Phalen sign remain positive. Light touch and  pinprick sensation diminished in thumb, index, and middle finger volarly.   Previous electrodiagnostic testing was again reviewed. Is is dated May 2024. The report indicates bilateral median nerve neuropathy at the carpal tunnel.   Pulses: 2+ and symmetric  Neurologic: Alert and oriented X 3, normal strength and tone. Normal symmetric reflexes. Normal coordination and gait    Cervical Papanicolaou to be done in MD's office    Results:     Lab Results   Component Value Date    WBC 5.4 10/25/2024    HGB 10.9 (L) 10/25/2024    HCT 33.7 (L) 10/25/2024    .0 10/25/2024    CREATSERUM 0.74 10/25/2024    BUN 18 10/25/2024     10/25/2024    K 4.5 10/25/2024     10/25/2024    CO2 28.0 10/25/2024    GLU 97 10/25/2024    CA 9.6 10/25/2024    ALB 4.0 10/24/2024    ALKPHO 78 10/24/2024    BILT 0.5 10/24/2024    TP 6.2 10/24/2024    AST 14 10/24/2024    ALT <7 (L) 10/24/2024    TSH 0.868 05/23/2023     03/12/2021    DDIMER 1.03 (H) 11/01/2020    MG 1.9 08/29/2024    TROP <0.045 11/01/2020    B12 989 (H) 04/19/2021       No results found.        Assessment/Plan:     * No active hospital problems. *    Assessment: Left wrist carpal tunnel syndrome    Plan: Given her failure of nonoperative treatment to date, I advised considerations of carpal tunnel release. Risks and benefits of surgery were discussed. Questions were answered. No guarantees were made. She does have a cardiac condition. I advised medical evaluation for risk stratification for surgery. Would advise the surgery under [place] block anesthesia with sedation. Follow up on the date of the procedure.     Follow up in office two weeks after surgery.     Ladonna Bartlett PA-C  6/19/2025         [1]   Past Medical History:   Anxiety state    Back problem    neck pain- pressure and tightness    Carpal tunnel syndrome    Essential hypertension    High blood pressure    High cholesterol    Hx of motion sickness    IBS (irritable bowel syndrome)    PONV  (postoperative nausea and vomiting)    lasts for hours    TIA (transient ischemic attack)   [2]   Past Surgical History:  Procedure Laterality Date    Cataract Left 01/21/2018    Cataract Right 07/31/2018    Hernia surgery  07/11/2016    Hysterectomy  09/08/2020    Removal gallbladder  03/04/2025    Total abdom hysterectomy      Total knee replacement Left    [3]   Family History  Problem Relation Age of Onset    Breast Cancer Sister         61-69    Breast Cancer Sister 40    Other (Parkinson's disease) Father     Seizure Disorder Daughter     Ovarian Cancer Neg    [4]   Social History  Socioeconomic History    Marital status: Single   Tobacco Use    Smoking status: Never     Passive exposure: Yes    Smokeless tobacco: Never   Vaping Use    Vaping status: Never Used   Substance and Sexual Activity    Alcohol use: Never    Drug use: Never   Other Topics Concern    Caffeine Concern Yes     Comment: Diet coke    Exercise Yes     Social Drivers of Health     Food Insecurity: No Food Insecurity (10/23/2024)    Food Insecurity     Food Insecurity: Never true   Transportation Needs: No Transportation Needs (10/23/2024)    Transportation Needs     Lack of Transportation: No   Recent Concern: Transportation Needs - Unmet Transportation Needs (8/23/2024)    Transportation Needs     Lack of Transportation: Yes   Housing Stability: Low Risk  (10/23/2024)    Housing Stability     Housing Instability: No   [5]   Allergies  Allergen Reactions    Antispasmodic NAUSEA AND VOMITING     pt unsure of reaction    Chlorthalidone DIZZINESS    Belladonna Alk-Phenobarbital UNKNOWN     pt unsure of reaction    Latex RASH   [6]   No medications prior to admission.

## 2025-06-23 ENCOUNTER — ANESTHESIA (OUTPATIENT)
Dept: SURGERY | Facility: HOSPITAL | Age: 75
End: 2025-06-23
Payer: MEDICARE

## 2025-06-23 ENCOUNTER — HOSPITAL ENCOUNTER (OUTPATIENT)
Facility: HOSPITAL | Age: 75
Setting detail: HOSPITAL OUTPATIENT SURGERY
Discharge: HOME | End: 2025-06-23
Attending: ORTHOPAEDIC SURGERY | Admitting: ORTHOPAEDIC SURGERY
Payer: MEDICARE

## 2025-06-23 ENCOUNTER — ANESTHESIA EVENT (OUTPATIENT)
Dept: SURGERY | Facility: HOSPITAL | Age: 75
End: 2025-06-23
Payer: MEDICARE

## 2025-06-23 VITALS
HEIGHT: 61 IN | TEMPERATURE: 99 F | BODY MASS INDEX: 23.41 KG/M2 | OXYGEN SATURATION: 98 % | RESPIRATION RATE: 16 BRPM | WEIGHT: 124 LBS | HEART RATE: 54 BPM | DIASTOLIC BLOOD PRESSURE: 78 MMHG | SYSTOLIC BLOOD PRESSURE: 155 MMHG

## 2025-06-23 DIAGNOSIS — G56.02 CARPAL TUNNEL SYNDROME OF LEFT WRIST: Primary | ICD-10-CM

## 2025-06-23 RX ORDER — MORPHINE SULFATE 4 MG/ML
2 INJECTION, SOLUTION INTRAMUSCULAR; INTRAVENOUS EVERY 10 MIN PRN
Status: DISCONTINUED | OUTPATIENT
Start: 2025-06-23 | End: 2025-06-23

## 2025-06-23 RX ORDER — HYDROCODONE BITARTRATE AND ACETAMINOPHEN 5; 325 MG/1; MG/1
1 TABLET ORAL EVERY 4 HOURS PRN
Refills: 0 | Status: DISCONTINUED | OUTPATIENT
Start: 2025-06-23 | End: 2025-06-23

## 2025-06-23 RX ORDER — MORPHINE SULFATE 10 MG/ML
6 INJECTION, SOLUTION INTRAMUSCULAR; INTRAVENOUS EVERY 10 MIN PRN
Status: DISCONTINUED | OUTPATIENT
Start: 2025-06-23 | End: 2025-06-23

## 2025-06-23 RX ORDER — HYDROMORPHONE HYDROCHLORIDE 1 MG/ML
0.2 INJECTION, SOLUTION INTRAMUSCULAR; INTRAVENOUS; SUBCUTANEOUS EVERY 5 MIN PRN
Status: DISCONTINUED | OUTPATIENT
Start: 2025-06-23 | End: 2025-06-23

## 2025-06-23 RX ORDER — NALOXONE HYDROCHLORIDE 0.4 MG/ML
0.08 INJECTION, SOLUTION INTRAMUSCULAR; INTRAVENOUS; SUBCUTANEOUS AS NEEDED
Status: DISCONTINUED | OUTPATIENT
Start: 2025-06-23 | End: 2025-06-23

## 2025-06-23 RX ORDER — HYDROCODONE BITARTRATE AND ACETAMINOPHEN 5; 325 MG/1; MG/1
1 TABLET ORAL EVERY 4 HOURS PRN
Qty: 3 TABLET | Refills: 0 | Status: SHIPPED | OUTPATIENT
Start: 2025-06-23

## 2025-06-23 RX ORDER — SODIUM CHLORIDE, SODIUM LACTATE, POTASSIUM CHLORIDE, CALCIUM CHLORIDE 600; 310; 30; 20 MG/100ML; MG/100ML; MG/100ML; MG/100ML
INJECTION, SOLUTION INTRAVENOUS CONTINUOUS
Status: DISCONTINUED | OUTPATIENT
Start: 2025-06-23 | End: 2025-06-23

## 2025-06-23 RX ORDER — LIDOCAINE HYDROCHLORIDE 10 MG/ML
INJECTION, SOLUTION EPIDURAL; INFILTRATION; INTRACAUDAL; PERINEURAL AS NEEDED
Status: DISCONTINUED | OUTPATIENT
Start: 2025-06-23 | End: 2025-06-23 | Stop reason: SURG

## 2025-06-23 RX ORDER — ACETAMINOPHEN 500 MG
1000 TABLET ORAL ONCE
Status: COMPLETED | OUTPATIENT
Start: 2025-06-23 | End: 2025-06-23

## 2025-06-23 RX ORDER — ONDANSETRON 2 MG/ML
4 INJECTION INTRAMUSCULAR; INTRAVENOUS EVERY 6 HOURS PRN
Status: DISCONTINUED | OUTPATIENT
Start: 2025-06-23 | End: 2025-06-23

## 2025-06-23 RX ORDER — LABETALOL HYDROCHLORIDE 5 MG/ML
5 INJECTION, SOLUTION INTRAVENOUS EVERY 5 MIN PRN
Status: DISCONTINUED | OUTPATIENT
Start: 2025-06-23 | End: 2025-06-23

## 2025-06-23 RX ORDER — MORPHINE SULFATE 4 MG/ML
4 INJECTION, SOLUTION INTRAMUSCULAR; INTRAVENOUS EVERY 10 MIN PRN
Status: DISCONTINUED | OUTPATIENT
Start: 2025-06-23 | End: 2025-06-23

## 2025-06-23 RX ORDER — METOCLOPRAMIDE HYDROCHLORIDE 5 MG/ML
10 INJECTION INTRAMUSCULAR; INTRAVENOUS EVERY 8 HOURS PRN
Status: DISCONTINUED | OUTPATIENT
Start: 2025-06-23 | End: 2025-06-23

## 2025-06-23 RX ORDER — HYDROMORPHONE HYDROCHLORIDE 1 MG/ML
0.6 INJECTION, SOLUTION INTRAMUSCULAR; INTRAVENOUS; SUBCUTANEOUS EVERY 5 MIN PRN
Status: DISCONTINUED | OUTPATIENT
Start: 2025-06-23 | End: 2025-06-23

## 2025-06-23 RX ORDER — HYDROMORPHONE HYDROCHLORIDE 1 MG/ML
0.4 INJECTION, SOLUTION INTRAMUSCULAR; INTRAVENOUS; SUBCUTANEOUS EVERY 5 MIN PRN
Status: DISCONTINUED | OUTPATIENT
Start: 2025-06-23 | End: 2025-06-23

## 2025-06-23 RX ADMIN — LIDOCAINE HYDROCHLORIDE 50 MG: 10 INJECTION, SOLUTION EPIDURAL; INFILTRATION; INTRACAUDAL; PERINEURAL at 15:12:00

## 2025-06-23 RX ADMIN — SODIUM CHLORIDE, SODIUM LACTATE, POTASSIUM CHLORIDE, CALCIUM CHLORIDE: 600; 310; 30; 20 INJECTION, SOLUTION INTRAVENOUS at 15:03:00

## 2025-06-23 NOTE — ANESTHESIA POSTPROCEDURE EVALUATION
Patient: Zaida Osuna    Procedure Summary       Date: 06/23/25 Room / Location: [facility] MAIN OR  / [facility] MAIN OR    Anesthesia Start: 1503 Anesthesia Stop: 1552    Procedure: Left carpal tunnel release (Left: Hand) Diagnosis: (Left carpal tunnel syndrome)    Surgeons: Gilberto Lees MD Anesthesiologist: Naun Jeter MD    Anesthesia Type: MAC, mindi block ASA Status: 2            Anesthesia Type: MAC, mindi block    Vitals Value Taken Time   /75 06/23/25 15:52   Temp 37.5 06/23/25 15:52   Pulse 68 06/23/25 15:51   Resp 14 06/23/25 15:51   SpO2 99 % 06/23/25 15:51   Vitals shown include unfiled device data.    [facility] AN Post Evaluation:   Patient Evaluated in PACU  Patient Participation: complete - patient participated  Level of Consciousness: awake  Pain Management: adequate  Airway Patency:patent  Dental exam unchanged from preop  Yes    Nausea/Vomiting: none  Cardiovascular Status: acceptable  Respiratory Status: acceptable  Postoperative Hydration acceptable      Naun Jeter MD  6/23/2025 3:52 PM

## 2025-06-23 NOTE — ANESTHESIA PREPROCEDURE EVALUATION
Anesthesia PreOp Note    HPI:     Zaida Osuna is a 74 year old female who presents for preoperative consultation requested by: Gilberto Lees MD    Date of Surgery: 6/23/2025    Procedure(s):  Left carpal tunnel release  Indication: Left carpal tunnel syndrome    Relevant Problems   No relevant active problems       NPO:  Last Liquid Consumption Date: 06/23/25  Last Liquid Consumption Time: 0730  Last Solid Consumption Date: 06/22/25  Last Solid Consumption Time: 2330  Last Liquid Consumption Date: 06/23/25          History Review:  Patient Active Problem List    Diagnosis Date Noted    Anxiety with somatization 10/23/2024    Major depressive disorder, recurrent episode, moderate (HCC) 10/23/2024    TIA (transient ischemic attack) 10/22/2024    Primary osteoarthritis of left knee 08/23/2024    Essential hypertension 08/23/2024    Hematuria 01/26/2022    Gastroesophageal reflux disease 10/20/2021    Right shoulder pain 07/02/2021    Osteopenia with high risk of fracture 04/16/2021    Mixed hyperlipidemia 03/12/2021       Past Medical History[1]    Past Surgical History[2]    Prescriptions Prior to Admission[3]  Current Medications and Prescriptions Ordered in Epic[4]    Allergies[5]    Family History[6]  Social Hx on file[7]    Available pre-op labs reviewed.             Vital Signs:  Body mass index is 23.43 kg/m².   height is 1.549 m (5' 1\") and weight is 56.2 kg (124 lb). Her oral temperature is 98.6 °F (37 °C). Her blood pressure is 122/71 and her pulse is 56. Her respiration is 18 and oxygen saturation is 100%.   Vitals:    06/20/25 0834 06/23/25 1337   BP:  122/71   Pulse:  56   Resp:  18   Temp:  98.6 °F (37 °C)   TempSrc:  Oral   SpO2:  100%   Weight: 58.1 kg (128 lb) 56.2 kg (124 lb)   Height: 1.549 m (5' 1\") 1.549 m (5' 1\")        Anesthesia Evaluation     Patient summary reviewed and Nursing notes reviewed    History of anesthetic complications (PONV)   Airway   Mallampati: II  TM distance: >3 FB  Neck  ROM: full  Dental - Dentition appears grossly intact     Pulmonary - negative ROS and normal exam   Cardiovascular - normal exam  Exercise tolerance: good  (+) hypertension well controlled    ROS comment: hyperlipidemia    Neuro/Psych    (+)  neuromuscular disease, TIA, anxiety/panic attacks,        GI/Hepatic/Renal    (+) GERD well controlled    Endo/Other - negative ROS   Abdominal  - normal exam                 Anesthesia Plan:   ASA:  2  Plan:   MAC and mindi block  Post-op Pain Management: Oral pain medication and IV analgesics  Informed Consent Plan and Risks Discussed With:  Patient      I have informed Zaida Osuna of the nature of the anesthetic plan, benefits, risks including possible dental damage if relevant, major complications, and any alternative forms of anesthetic management.   All of the patient's questions were answered to the best of my ability. The patient desires the anesthetic management as planned.  BORIS JOHNSON MD  6/23/2025 2:46 PM  Present on Admission:  **None**           [1]   Past Medical History:   Anxiety state    Back problem    neck pain- pressure and tightness    Carpal tunnel syndrome    Cataract    Congenital anomaly of heart (HCC)    Essential hypertension    High blood pressure    High cholesterol    Hx of motion sickness    IBS (irritable bowel syndrome)    Neuropathy    Osteoarthritis    PONV (postoperative nausea and vomiting)    lasts for hours    TIA (transient ischemic attack)   [2]   Past Surgical History:  Procedure Laterality Date    Cataract Left 01/21/2018    Cataract Right 07/31/2018    Hernia surgery  07/11/2016    Hysterectomy  09/08/2020    Removal gallbladder  03/04/2025    Total knee replacement Left    [3]   Medications Prior to Admission   Medication Sig Dispense Refill Last Dose/Taking    clopidogrel 75 MG Oral Tab Take 1 tablet (75 mg total) by mouth in the morning.   6/16/2025    aspirin 81 MG Oral Chew Tab Chew 1 tablet (81 mg total) by mouth daily. 30  tablet 0 6/16/2025    polypropylene glycol- (SYSTANE ULTRA) 0.4-0.3 % Ophthalmic Solution Apply 2 drops to eye as needed.   Past Week    atorvastatin 20 MG Oral Tab Take 1 tablet (20 mg total) by mouth nightly.   6/22/2025 Evening    amLODIPine 2.5 MG Oral Tab Take 1 tablet (2.5 mg total) by mouth in the morning.   6/22/2025 Morning    Polyethylene Glycol 3350 (MIRALAX OR) Take 1 Package by mouth daily as needed (constipation).   Past Week    acetaminophen 500 MG Oral Tab Take 2 tablets (1,000 mg total) by mouth daily as needed for Pain.   6/22/2025 Evening    Cholecalciferol 25 MCG (1000 UT) Oral Tab Take 1 tablet (1,000 Units total) by mouth in the morning. Vitamin d and k combined .   Past Week   [4]   Current Facility-Administered Medications Ordered in Epic   Medication Dose Route Frequency Provider Last Rate Last Admin    lactated ringers infusion   Intravenous Continuous Gilberto Lees MD 20 mL/hr at 06/23/25 1406 New Bag at 06/23/25 1406     No current [facility]-ordered outpatient medications on file.   [5]   Allergies  Allergen Reactions    5ht3 Receptor Antagonists OTHER (SEE COMMENTS)     Does not know    Antispasmodic NAUSEA AND VOMITING     pt unsure of reaction    Chlorthalidone DIZZINESS    Belladonna Alk-Phenobarbital UNKNOWN     pt unsure of reaction    Duloxetine OTHER (SEE COMMENTS)     suicidality    Serotonin Reuptake Inhibitors UNKNOWN    Latex RASH   [6]   Family History  Problem Relation Age of Onset    Breast Cancer Sister         61-69    Breast Cancer Sister 40    Other (Parkinson's disease) Father     Seizure Disorder Daughter     Ovarian Cancer Neg    [7]   Social History  Socioeconomic History    Marital status: Single   Tobacco Use    Smoking status: Never     Passive exposure: Yes    Smokeless tobacco: Never   Vaping Use    Vaping status: Never Used   Substance and Sexual Activity    Alcohol use: Never    Drug use: Never   Other Topics Concern    Caffeine Concern Yes      Comment: Diet coke    Exercise Yes

## 2025-06-23 NOTE — OPERATIVE REPORT
Operative Note    Patient Name: Zaida Osuna    Preoperative Diagnosis: Left carpal tunnel syndrome    Postoperative Diagnosis: Left carpal tunnel syndrome    Primary Surgeon: YANIV GUERRERO MD     Assistant: Dhruv    Procedures: Left carpal tunnel release    Surgical Findings: above    Anesthesia: Anthony Block    Complications: none    Specimen: none    Drains: none    Condition: stable to RR    Estimated Blood Loss: 1cc    YANIV GUERRERO MD

## 2025-06-23 NOTE — OPERATIVE REPORT
[facility]    PATIENT'S NAME: ULISES RASHID   ATTENDING PHYSICIAN: Gilberto Lees MD   OPERATING PHYSICIAN: Gilberto Lees MD   PATIENT ACCOUNT#:   [id number]    LOCATION:  [facility] PACU 5 [facility] 10  MEDICAL RECORD #:   [id number]       YOB: 1950  ADMISSION DATE:       06/23/2025      OPERATION DATE:  06/23/2025    OPERATIVE REPORT      PREOPERATIVE DIAGNOSIS:  Left carpal tunnel syndrome.  POSTOPERATIVE DIAGNOSIS:  Left carpal tunnel syndrome.  PROCEDURE:  Left open carpal tunnel release.    ASSISTANT:  Ladonna Bartlett PA-C     ANESTHESIA:  Cumbola block with sedation.    COMPLICATIONS:  None.    BLOOD LOSS:  1 mL.    SPECIMEN:  None.    DRAINS:  None.    INDICATIONS:  Patient is a 74-year-old female with a history of left wrist and hand pain and numbness.  Preoperative physical findings, imaging studies, and electrodiagnostic studies were consistent with carpal tunnel syndrome.  He failed conservative treatments.  After discussion with the patient of the risks and benefits of proceeding with operative treatment of the left wrist, she wished to proceed with surgery.    OPERATIVE TECHNIQUE:  The patient was identified in the preoperative holding area.  The appropriate consents were obtained.  She was taken to the operating room and placed in supine position on the operating room table.  After adequate Cumbola block anesthesia and sedation was achieved, the left hand and wrist were prepped draped in a sterile fashion.  A longitudinal incision was then made over the volar wrist just ulnar to the thenar crease.  Care was taken to cross the wrist flexion angle at a 45-degree angle.  Sharp dissection was carried out down through subcutaneous tissue.  The palmar fascia was identified and incised.  The transverse carpal ligament was next incised transversely in order to decompress the carpal tunnel.  The median nerve was inspected.  There was no intrinsic damage to the nerve.  Care was taken to avoid the  proximal sensory branch and distal motor branch of the median nerve.  I ensured adequate decompression of the nerve by inserting my small finger under the remaining retinacular tissues proximally and distally, and excellent decompression was noted.  The wound was then irrigated with sterile saline.  The skin and subcutaneous layers were closed with 4-0 nylon sutures in interrupted fashion.  A sterile dressing was applied.  The patient's anesthesia was reversed.  She was extubated and taken to the recovery room in stable condition.  All sponge and instrument counts were reported as correct.  The attending physician, Dr. Lees, was present and performed all critical portions of the procedure.  There were no complications.  First assistant was medically necessary for the surgery.  She assisted with patient positioning.  She assisted with retraction of soft tissues for accurate decompression of the median nerve.  She helped with hemostasis and wound closure.  Without the aid of the assistant, the surgical procedure would not have been possible.    Dictated By Gilberto Lees MD  d: 06/23/2025 15:41:10  t: 06/23/2025 16:04:15  [facility] [id number]  O/

## 2025-06-26 NOTE — TELEPHONE ENCOUNTER
Per Epic review:  -both Aspirin and Plavix listed as active medications   -neither have been refilled by Dr. Cox  -listed as medications taking at last office visit 4/1/25    [facility], Dr. Brown's office asking for clarification if pt should be taking both medications.    To call their office at [phone]

## 2025-06-30 NOTE — TELEPHONE ENCOUNTER
Spoke to Dr. Brown and confirmed patient should only be on Asprin 81 mg and dose not need to be on plavix also.

## 2025-08-06 ENCOUNTER — TELEPHONE (OUTPATIENT)
Dept: NEUROLOGY | Facility: CLINIC | Age: 75
End: 2025-08-06

## (undated) DIAGNOSIS — Z12.31 SCREENING MAMMOGRAM, ENCOUNTER FOR: Primary | ICD-10-CM

## (undated) DEVICE — SOLUTION IRRIG 1000ML 0.9% NACL USP BTL

## (undated) DEVICE — OCCLUSIVE GAUZE STRIP,3% BISMUTH TRIBROMOPHENATE IN PETROLATUM BLEND: Brand: XEROFORM

## (undated) DEVICE — BANDAGE COMPR W6INXL11YD WVN COT/E CLP CLSR

## (undated) DEVICE — ANTIBACTERIAL UNDYED BRAIDED (POLYGLACTIN 910), SYNTHETIC ABSORBABLE SUTURE: Brand: COATED VICRYL

## (undated) DEVICE — SET GUIDEPIN FOR PSI CR PERSONA SYS

## (undated) DEVICE — ADHESIVE LIQ 2/3ML VI MASTISOL

## (undated) DEVICE — HOOD, PEEL-AWAY: Brand: FLYTE

## (undated) DEVICE — PACK CDS UPPER EXTREMITY

## (undated) DEVICE — DISPOSABLE TOURNIQUET CUFF SINGLE BLADDER, DUAL PORT AND QUICK CONNECT CONNECTOR: Brand: COLOR CUFF

## (undated) DEVICE — SHEET,DRAPE,53X77,STERILE: Brand: MEDLINE

## (undated) DEVICE — GAMMEX® PI HYBRID SIZE 8, STERILE POWDER-FREE SURGICAL GLOVE, POLYISOPRENE AND NEOPRENE BLEND: Brand: GAMMEX

## (undated) DEVICE — SCREW ORTH 2.5X25MM FEM HEX PERSONA

## (undated) DEVICE — SUT ETHBND XL 1 18IN CTX NABSRB GRN CR 48MM

## (undated) DEVICE — PIN DRL 75MM HDLSS TRCR NXGN

## (undated) DEVICE — SUCTION CANISTER, 3000CC,SAFELINER: Brand: DEROYAL

## (undated) DEVICE — APPLICATOR PREP 26ML CHG 2% ISO ALC 70%

## (undated) DEVICE — HOOD: Brand: FLYTE

## (undated) DEVICE — WRAP COOLING KNEE W/ICE PILLOW

## (undated) DEVICE — PADDING,UNDERCAST,COTTON, 3X4YD STERILE: Brand: MEDLINE

## (undated) DEVICE — SLIM BODY SKIN STAPLER: Brand: APPOSE ULC

## (undated) DEVICE — COTTON ROLL: Brand: DEROYAL

## (undated) DEVICE — ZZ-CONVERTED-TO-310301 MEGADYNE 2.75IN NEEDLE MONO

## (undated) DEVICE — ENCORE® LATEX MICRO SIZE 8, STERILE LATEX POWDER-FREE SURGICAL GLOVE: Brand: ENCORE

## (undated) DEVICE — CEMENT MIXING SYSTEM WITH FEMORAL BREAKWAY NOZZLE: Brand: REVOLUTION

## (undated) DEVICE — 3M™ COBAN™ NL STERILE NON-LATEX SELF-ADHERENT WRAP, 2084S, 4 IN X 5 YD (10 CM X 4,5 M), 18 ROLLS/CASE: Brand: 3M™ COBAN™

## (undated) DEVICE — Device: Brand: STABLECUT®

## (undated) DEVICE — SCREW FIX 3.5X48MM HEX HD

## (undated) DEVICE — SUT ETHLN 4-0 18IN PS-2 NABSRB BLK 19MM 3/8 C

## (undated) DEVICE — SOLUTION IRRIG 3000ML 0.9% NACL FLX CONT

## (undated) DEVICE — 3M™ TEGADERM™ TRANSPARENT FILM DRESSING FRAME STYLE, 1626W, 4 IN X 4-3/4 IN (10 CM X 12 CM), 50/CT 4CT/CASE: Brand: 3M™ TEGADERM™

## (undated) DEVICE — PACK CDS TOTAL KNEE

## (undated) DEVICE — SPONGE 4X4 10PK

## (undated) DEVICE — KIT EVAC 400CC DIA1/8IN H PAT 12.5IN 3 SPR

## (undated) DEVICE — 3M™ STERI-STRIP™ REINFORCED ADHESIVE SKIN CLOSURES, R1547, 1/2 IN X 4 IN (12 MM X 100 MM), 6 STRIPS/ENVELOPE: Brand: 3M™ STERI-STRIP™

## (undated) DEVICE — TRAY CATH FOLEY 16FR INCLUDE BARDX IC COMPLT CARE

## (undated) NOTE — LETTER
March 9, 2021         Seven Jolly MD  [facility] [address]  [facility] [address] [facility]      Patient: Connie Nelson   YOB: 1950   Date of Visit: 3/9/2021       Dear Dr. Saud Teresa MD,    I saw your patient, Connie Nelson, on 3/9/2021.  Enclosed is my cons